# Patient Record
Sex: MALE | Race: WHITE | Employment: OTHER | URBAN - METROPOLITAN AREA
[De-identification: names, ages, dates, MRNs, and addresses within clinical notes are randomized per-mention and may not be internally consistent; named-entity substitution may affect disease eponyms.]

---

## 2017-02-15 ENCOUNTER — TELEPHONE (OUTPATIENT)
Dept: FAMILY MEDICINE CLINIC | Age: 77
End: 2017-02-15

## 2017-02-16 ENCOUNTER — OFFICE VISIT (OUTPATIENT)
Dept: FAMILY MEDICINE CLINIC | Age: 77
End: 2017-02-16

## 2017-02-16 VITALS
HEIGHT: 66 IN | BODY MASS INDEX: 32.33 KG/M2 | WEIGHT: 201.2 LBS | HEART RATE: 66 BPM | SYSTOLIC BLOOD PRESSURE: 109 MMHG | TEMPERATURE: 97.6 F | DIASTOLIC BLOOD PRESSURE: 79 MMHG | RESPIRATION RATE: 12 BRPM

## 2017-02-16 DIAGNOSIS — K59.01 SLOW TRANSIT CONSTIPATION: ICD-10-CM

## 2017-02-16 DIAGNOSIS — R41.3 MEMORY DIFFICULTIES: ICD-10-CM

## 2017-02-16 DIAGNOSIS — T14.8XXA BRUISE: ICD-10-CM

## 2017-02-16 DIAGNOSIS — R35.1 NOCTURIA: ICD-10-CM

## 2017-02-16 DIAGNOSIS — R52 ACHES: Primary | ICD-10-CM

## 2017-02-16 DIAGNOSIS — G47.9 SLEEP DIFFICULTIES: ICD-10-CM

## 2017-02-16 DIAGNOSIS — G44.031 INTRACTABLE EPISODIC PAROXYSMAL HEMICRANIA: ICD-10-CM

## 2017-02-16 PROCEDURE — 99204 OFFICE O/P NEW MOD 45 MIN: CPT | Performed by: FAMILY MEDICINE

## 2017-02-16 RX ORDER — ZINC GLUCONATE 50 MG
50 TABLET ORAL DAILY
COMMUNITY

## 2017-02-16 RX ORDER — VITAMIN B COMPLEX
1 CAPSULE ORAL DAILY
COMMUNITY
End: 2018-03-14 | Stop reason: DRUGHIGH

## 2017-02-16 RX ORDER — LISINOPRIL 20 MG/1
1 TABLET ORAL DAILY
COMMUNITY
Start: 2017-02-14

## 2017-02-16 RX ORDER — ACETAMINOPHEN 160 MG
2 TABLET,DISINTEGRATING ORAL DAILY
COMMUNITY
End: 2018-11-27 | Stop reason: DRUGHIGH

## 2017-02-16 RX ORDER — TAMSULOSIN HYDROCHLORIDE 0.4 MG/1
1 CAPSULE ORAL DAILY
COMMUNITY
Start: 2016-12-06

## 2017-02-16 RX ORDER — MAGNESIUM 200 MG
1 TABLET ORAL
COMMUNITY

## 2017-02-16 RX ORDER — MULTIVITAMIN WITH IRON
200 TABLET ORAL DAILY
COMMUNITY

## 2017-02-16 ASSESSMENT — ENCOUNTER SYMPTOMS
SINUS PRESSURE: 1
SHORTNESS OF BREATH: 1
ALLERGIC/IMMUNOLOGIC NEGATIVE: 1
CONSTIPATION: 1

## 2017-02-17 ENCOUNTER — TELEPHONE (OUTPATIENT)
Dept: FAMILY MEDICINE CLINIC | Age: 77
End: 2017-02-17

## 2017-02-17 PROBLEM — K90.9 INTESTINAL MALABSORPTION, UNSPECIFIED: Status: ACTIVE | Noted: 2017-02-17

## 2017-03-07 ENCOUNTER — OFFICE VISIT (OUTPATIENT)
Dept: FAMILY MEDICINE CLINIC | Age: 77
End: 2017-03-07

## 2017-03-07 VITALS
HEART RATE: 88 BPM | BODY MASS INDEX: 32.5 KG/M2 | SYSTOLIC BLOOD PRESSURE: 83 MMHG | HEIGHT: 66 IN | RESPIRATION RATE: 12 BRPM | WEIGHT: 202.2 LBS | DIASTOLIC BLOOD PRESSURE: 60 MMHG | TEMPERATURE: 97.5 F

## 2017-03-07 DIAGNOSIS — K90.9 INTESTINAL MALABSORPTION, UNSPECIFIED: ICD-10-CM

## 2017-03-07 DIAGNOSIS — K59.01 SLOW TRANSIT CONSTIPATION: ICD-10-CM

## 2017-03-07 DIAGNOSIS — R35.1 NOCTURIA: ICD-10-CM

## 2017-03-07 DIAGNOSIS — T14.8XXA BRUISE: ICD-10-CM

## 2017-03-07 DIAGNOSIS — G47.9 SLEEP DIFFICULTIES: ICD-10-CM

## 2017-03-07 DIAGNOSIS — R52 ACHES: ICD-10-CM

## 2017-03-07 DIAGNOSIS — R41.3 MEMORY DIFFICULTIES: ICD-10-CM

## 2017-03-07 DIAGNOSIS — G44.031 INTRACTABLE EPISODIC PAROXYSMAL HEMICRANIA: ICD-10-CM

## 2017-03-07 PROCEDURE — 99214 OFFICE O/P EST MOD 30 MIN: CPT | Performed by: FAMILY MEDICINE

## 2017-03-07 RX ORDER — AMPICILLIN TRIHYDRATE 250 MG
3 CAPSULE ORAL 4 TIMES DAILY
COMMUNITY

## 2017-03-07 RX ORDER — MELATONIN 3 MG
50 TABLET ORAL DAILY
COMMUNITY

## 2017-03-07 RX ORDER — OMEGA-3 FATTY ACIDS/FISH OIL 300-1000MG
2 CAPSULE ORAL
COMMUNITY
End: 2018-05-23 | Stop reason: CLARIF

## 2017-07-11 ENCOUNTER — OFFICE VISIT (OUTPATIENT)
Dept: FAMILY MEDICINE CLINIC | Age: 77
End: 2017-07-11

## 2017-07-11 VITALS
TEMPERATURE: 98 F | HEART RATE: 79 BPM | DIASTOLIC BLOOD PRESSURE: 65 MMHG | SYSTOLIC BLOOD PRESSURE: 90 MMHG | WEIGHT: 201 LBS | HEIGHT: 67 IN | RESPIRATION RATE: 14 BRPM | BODY MASS INDEX: 31.55 KG/M2

## 2017-07-11 DIAGNOSIS — G44.031 INTRACTABLE EPISODIC PAROXYSMAL HEMICRANIA: ICD-10-CM

## 2017-07-11 DIAGNOSIS — R35.1 NOCTURIA: ICD-10-CM

## 2017-07-11 DIAGNOSIS — K59.01 SLOW TRANSIT CONSTIPATION: ICD-10-CM

## 2017-07-11 DIAGNOSIS — T14.8XXA BRUISE: ICD-10-CM

## 2017-07-11 DIAGNOSIS — R41.3 MEMORY DIFFICULTIES: ICD-10-CM

## 2017-07-11 DIAGNOSIS — R52 ACHES: ICD-10-CM

## 2017-07-11 DIAGNOSIS — K90.9 INTESTINAL MALABSORPTION, UNSPECIFIED: ICD-10-CM

## 2017-07-11 DIAGNOSIS — G47.9 SLEEP DIFFICULTIES: ICD-10-CM

## 2017-07-11 PROCEDURE — 99214 OFFICE O/P EST MOD 30 MIN: CPT | Performed by: FAMILY MEDICINE

## 2017-07-11 ASSESSMENT — PATIENT HEALTH QUESTIONNAIRE - PHQ9
SUM OF ALL RESPONSES TO PHQ QUESTIONS 1-9: 0
SUM OF ALL RESPONSES TO PHQ9 QUESTIONS 1 & 2: 0
1. LITTLE INTEREST OR PLEASURE IN DOING THINGS: 0
2. FEELING DOWN, DEPRESSED OR HOPELESS: 0

## 2017-07-19 ENCOUNTER — TELEPHONE (OUTPATIENT)
Dept: FAMILY MEDICINE CLINIC | Age: 77
End: 2017-07-19

## 2017-11-30 ENCOUNTER — NURSE ONLY (OUTPATIENT)
Dept: FAMILY MEDICINE CLINIC | Age: 77
End: 2017-11-30
Payer: MEDICARE

## 2017-11-30 DIAGNOSIS — G44.031 INTRACTABLE EPISODIC PAROXYSMAL HEMICRANIA: ICD-10-CM

## 2017-11-30 DIAGNOSIS — K90.9 INTESTINAL MALABSORPTION, UNSPECIFIED TYPE: ICD-10-CM

## 2017-11-30 DIAGNOSIS — R41.3 MEMORY DIFFICULTIES: ICD-10-CM

## 2017-11-30 DIAGNOSIS — R52 ACHES: ICD-10-CM

## 2017-11-30 DIAGNOSIS — R35.1 NOCTURIA: ICD-10-CM

## 2017-11-30 DIAGNOSIS — T14.8XXA BRUISE: ICD-10-CM

## 2017-11-30 DIAGNOSIS — K59.01 SLOW TRANSIT CONSTIPATION: ICD-10-CM

## 2017-11-30 DIAGNOSIS — G47.9 SLEEP DIFFICULTIES: ICD-10-CM

## 2017-11-30 DIAGNOSIS — K90.9 INTESTINAL MALABSORPTION: ICD-10-CM

## 2017-11-30 LAB
AVERAGE GLUCOSE: 96 MG/DL (ref 70–126)
BASOPHILS # BLD: 0.6 %
BASOPHILS ABSOLUTE: 0 THOU/MM3 (ref 0–0.1)
CALCIUM SERPL-MCNC: 9.8 MG/DL (ref 8.5–10.5)
CHOLESTEROL, TOTAL: 273 MG/DL (ref 100–199)
EOSINOPHIL # BLD: 5.8 %
EOSINOPHILS ABSOLUTE: 0.4 THOU/MM3 (ref 0–0.4)
HBA1C MFR BLD: 5.2 % (ref 4.4–6.4)
HCT VFR BLD CALC: 49.6 % (ref 42–52)
HDLC SERPL-MCNC: 61 MG/DL
HEMOGLOBIN: 16.6 GM/DL (ref 14–18)
LDL CHOLESTEROL CALCULATED: 170 MG/DL
LYMPHOCYTES # BLD: 22.7 %
LYMPHOCYTES ABSOLUTE: 1.7 THOU/MM3 (ref 1–4.8)
MAGNESIUM: 2.5 MG/DL (ref 1.6–2.4)
MCH RBC QN AUTO: 29.4 PG (ref 27–31)
MCHC RBC AUTO-ENTMCNC: 33.4 GM/DL (ref 33–37)
MCV RBC AUTO: 87.8 FL (ref 80–94)
MONOCYTES # BLD: 6.9 %
MONOCYTES ABSOLUTE: 0.5 THOU/MM3 (ref 0.4–1.3)
NUCLEATED RED BLOOD CELLS: 0 /100 WBC
PDW BLD-RTO: 14.1 % (ref 11.5–14.5)
PLATELET # BLD: 254 THOU/MM3 (ref 130–400)
PMV BLD AUTO: 8.1 MCM (ref 7.4–10.4)
PTH INTACT: 42 PG/ML (ref 15–65)
RBC # BLD: 5.65 MILL/MM3 (ref 4.7–6.1)
SEG NEUTROPHILS: 64 %
SEGMENTED NEUTROPHILS ABSOLUTE COUNT: 4.7 THOU/MM3 (ref 1.8–7.7)
T3 TOTAL: 116 NG/DL (ref 72–181)
TRIGL SERPL-MCNC: 210 MG/DL (ref 0–199)
TSH SERPL DL<=0.05 MIU/L-ACNC: 2.12 UIU/ML (ref 0.4–4.2)
VITAMIN D 25-HYDROXY: 58 NG/ML (ref 30–100)
WBC # BLD: 7.4 THOU/MM3 (ref 4.8–10.8)

## 2017-11-30 PROCEDURE — 36415 COLL VENOUS BLD VENIPUNCTURE: CPT | Performed by: FAMILY MEDICINE

## 2017-12-01 LAB — THYROXINE (T4): 6.7 UG/DL (ref 4.5–12)

## 2017-12-02 LAB
C-REACTIVE PROTEIN, HIGH SENSITIVITY: 5.7 MG/L
DHEAS (DHEA SULFATE): 364 UG/DL (ref 28–175)
HOMOCYSTEINE, TOTAL: 9 UMOL/L
TESTOSTERONE FREE: NORMAL
THYROID PEROXIDASE ANTIBODY: 2.6 IU/ML (ref 0–9)

## 2017-12-03 LAB — GLIADIN PEPTIDE IGG, IGA: NORMAL

## 2017-12-05 LAB — DHEA UNCONJUGATED: 2.44 NG/ML (ref 0.63–4.7)

## 2017-12-14 ENCOUNTER — OFFICE VISIT (OUTPATIENT)
Dept: FAMILY MEDICINE CLINIC | Age: 77
End: 2017-12-14
Payer: MEDICARE

## 2017-12-14 VITALS
DIASTOLIC BLOOD PRESSURE: 75 MMHG | HEIGHT: 67 IN | BODY MASS INDEX: 32.3 KG/M2 | HEART RATE: 78 BPM | SYSTOLIC BLOOD PRESSURE: 108 MMHG | WEIGHT: 205.8 LBS | RESPIRATION RATE: 12 BRPM | TEMPERATURE: 98 F

## 2017-12-14 DIAGNOSIS — K59.01 SLOW TRANSIT CONSTIPATION: ICD-10-CM

## 2017-12-14 DIAGNOSIS — K90.49 MALABSORPTION DUE TO INTOLERANCE, NOT ELSEWHERE CLASSIFIED: ICD-10-CM

## 2017-12-14 DIAGNOSIS — E78.5 ELEVATED LIPIDS: Primary | ICD-10-CM

## 2017-12-14 DIAGNOSIS — G47.9 SLEEP DIFFICULTIES: ICD-10-CM

## 2017-12-14 DIAGNOSIS — R73.9 BLOOD GLUCOSE ELEVATED: ICD-10-CM

## 2017-12-14 DIAGNOSIS — R35.1 NOCTURIA: ICD-10-CM

## 2017-12-14 DIAGNOSIS — R41.3 MEMORY DIFFICULTIES: ICD-10-CM

## 2017-12-14 DIAGNOSIS — R52 ACHES: ICD-10-CM

## 2017-12-14 DIAGNOSIS — T14.8XXA BRUISE: ICD-10-CM

## 2017-12-14 DIAGNOSIS — G44.031 INTRACTABLE EPISODIC PAROXYSMAL HEMICRANIA: ICD-10-CM

## 2017-12-14 PROCEDURE — 99214 OFFICE O/P EST MOD 30 MIN: CPT | Performed by: FAMILY MEDICINE

## 2017-12-14 NOTE — PROGRESS NOTES
Subjective:      Patient ID: Zheng Minaya is a 69 y.o. male. HPI   Zheng Minaya is a 69 y.o. White male. Jacques Talavera  presents to the [employer] today for   Chief Complaint   Patient presents with    3 Month Follow-Up     wee    Discuss Labs    Discuss Medications   ,  and;   Elevated lipids    Malabsorption due to intolerance, not elsewhere classified    Nocturia    Intractable episodic paroxysmal hemicrania    Slow transit constipation    Aches    Memory difficulties    Bruise    Sleep difficulties    Blood glucose elevated      I have reviewed Zheng Minaya medical, surgical and other pertinent history in detail, and have updated medication and allergy information in the computerized patient record. Clinical Care Team:     -Referring Provider for today's consult: Self Referred  -Primary Care Provider: Jeremy Carbajal    Medical/Surgical History:   He  has a past medical history of Hypertension. His  has a past surgical history that includes ostate surgery; Tonsillectomy; and Colonoscopy. Family/Social History:     His family history includes High Blood Pressure in his mother; No Known Problems in his father. He  reports that he has quit smoking. He has never used smokeless tobacco. He reports that he does not drink alcohol or use drugs.     Medications/Allergies/Immunizations:     His current medication(s) include   Current Outpatient Prescriptions:     B Complex-Folic Acid (Y-444 BALANCED TR PO), Take 1 tablet by mouth 3 times daily (before meals) , Disp: , Rfl:     DHEA 10 MG TABS, Take 50 mg by mouth daily , Disp: , Rfl:     Cinnamon 500 MG CAPS, Take 2 capsules by mouth 3 times daily , Disp: , Rfl:     Omega 3 1000 MG CAPS, Take 2 capsules by mouth 4 times daily (before meals and nightly) [facility] # [id number] pharmaceutical grade CATHY , Disp: , Rfl:     Levomefolic Acid (5-MTHF PO), Take 10 mg by mouth every morning (before breakfast) Metabolic Maintenance at [facility], Disp: , well-developed and well-nourished. HENT:   Head: Normocephalic. Pulmonary/Chest: Effort normal.   Neurological: He is alert and oriented to person, place, and time. Psychiatric: He has a normal mood and affect. Thought content normal.   Nursing note and vitals reviewed. Assessment:     Laboratory Data:   Lab results were searched in Care Everywhere and/or those brought by the pateint were reviewed today with Ciarra Guerrero and he has a copy of their most recent labs to take home with them as noted below;       Imaging Data:   Imaging Data:       Assessment & Plan:       Impression:  1. Elevated lipids    2. Malabsorption due to intolerance, not elsewhere classified    3. Nocturia    4. Intractable episodic paroxysmal hemicrania    5. Slow transit constipation    6. Aches    7. Memory difficulties    8. Bruise    9. Sleep difficulties    10. Blood glucose elevated      Assessment and Plan:  After reviewing the patients chief complaints, reviewing their lab findings in great detail (with the patient and those accompanying them) which correlate to their chief complaints, symptoms, and or medical conditions; suggestions were made relating to changes in diet and or supplements which may improve the complaints and which will be reflected in their future lab findings; Chief Complaint   Patient presents with    3 Month Follow-Up     wee    Discuss Labs    Discuss Medications   ;    Plans for the next visits:  - Abnormal and non-optimal Labs were ordered today to be repeated in the next 120-365 days to assess changes from adjustments in nutrition and or nutrients.    - Patient instructed when having a blood draw to ask the  to divide their lab draws into multiple draws over several days if not feeling good at the time of the lab draw or if either prefers to do several smaller blood draws over several days  - Patient instructed to check with insurer before each lab draw and to go to the lab which the insurer Iron ferrous sulfat 325 mg tabs  - Centrum Silver look-a-like for most patients not needing iron, or  - Centrum plain look-a-like if need iron    Local pharmacy chains such as [facility], [facility], [facility];  - Triple Strength Fish Oil (enteric coated if available) or    If not enteric coated, can take from freezer for less burps  - B-50 or B-100 time released balanced B complex tabs not containing Vit C in tablet  - Cinnamon bark 500 mg (with Chromium but not extracts)   some brands list 1000 mg / serving of 2 500 mg capsules,    some brands have 1000 mg caps with the chromium but capsule size is huge  - Calcium carbonate/citrate, magnesium oxide/citrate, Vit D 3  as 3-4 tabs/caps/serving     Some Local Brands may contain Zinc which is acceptable for the first bottle or two  - Magnesium oxide 250 mg tabs for those having < 2 bowel movements daily  - Magnesium citrate TABLETS  or Slow Mag, or magnesium gluconate if having > 2 bowel movement/day  - Centrum Silver or look-a-like for most patients, Centrum plain or look-a-like with iron  - Vitamin D-3 comes as 1,000 IU or 2,000 IU or 5,000 IU gel caps or Liquid drops      Some brands containing or derived from soy oil or corn oil are OK if not allergic to soy  - Elemental Iron 65 mg tabs at bedtime is available over the counter if need more iron     Usually turns bowel movements grey, green or black but not a concern  - Apricot Kernel Oil (by Now) for dry skin and use in sensitive perineal area skin    Nutrients for ongoing use by Mail order for less expense from www.amazon. com, [employer], www.[employer]   - Triple Strength Fish Oil , Softgels   - B-100 time released balanced B complex   - Cinnamon bark 500 mg with or without Chromium but not extract (ineffective)  - Calcium carbonate 1000 mg, Magnesium oxide 500 mg, Vit D 3 best as individual  - Magnesium oxide 250 mg, 400 mg, or 500 mg tabs if < 2 bowel movements daily  - Multivitamin onions, parsley, peas, peaches, pears, bell peppers, potatoes, radishes, squash, sweet corn, tomatoes, turnips, watermelons  September; apples, beans, beets, blueberries, cabbage, cantaloupe, carrots, cauliflower, celery, cucumbers, eggplant, grapes, green onions, greens, lettuce, onions, parsley, peas, peaches, pears, bell peppers, plums, potatoes, pumpkins, radishes, fall red raspberries, squash, sweet corn, tomatoes, turnips, watermelons  October; apples, beets, broccoli, cabbage, carrots, cauliflower, celery, green onions, greens, lettuce, parsley, peas, pears, potatoes, pumpkins, radishes, fall red raspberries, squash, turnips  November; broccoli, cabbage, carrots, parsley, pears, peas  December: use canned, frozen or dried fruits since lower in latex    Up to half of latex-sensitive patients show allergic reactions to fruits (avocados, bananas, kiwifruits, papayas, peaches),   Annals of Allergy, 1994. These plants contain the same proteins that are allergens in latex. People with fruit allergies should warn physicians before undergoing procedures which may cause anaphylactic reaction if in contact with latex gloves. Some of the common foods with defined cross-reactivity to latex are avocado, banana, kiwi, chestnut, raw potato, tomato, stone fruits (e.g., peach, cherry), hazelnut, melons, celery, carrot, apple, pear, papaya, and almond. Foods with less well-defined cross-reactivity to latex are peanuts, peppers, citrus fruits, coconut, pineapple, priti, fig, passion fruit, Ugli fruit, and grape    This fruit/latex cross-reactivity is worsened by ethylene, a gas used to hasten commercial ripening. In nature, plants produce low levels of the hormone ethylene, which regulates germination, flowering, and ripening. Forced ripening by high ethylene concentrations, plants produce allergenic wound-repair proteins, which are similar to wound-repair proteins made during the tapping of rubber trees.  Sensitive individuals who ingest the fruit get a higher dose and worse reaction. Some people may even first become sensitized to latex through fruit. Can food processing increase the concentrations of allergenic proteins? Latex-sensitized children (and adults) in [place] often experience allergic reactions after eating bananas ripened artificially with ethylene. In the [facility], food distribution centers treat unripe bananas and other produce with ethylene to ripen; not commonly done in [facility] since fruit is tree-ripened there. Does treatment of food with ethylene induce banana proteins that cross-react with latex? (Marianela et al.    References:   Latex in Foods Allergy, http://ehp.niehs.nih.gov/members/2003/5811/5811.html    Search web for \" Whats in Season \" for where you live or are at the time you food shop  www.nutritioncouncil.org/pdf/healthy/SeasonalProduce. pdf ,   Management of Latex, http://medicalcenter. [facility].edu/  search for latex

## 2018-02-28 ENCOUNTER — NURSE ONLY (OUTPATIENT)
Dept: FAMILY MEDICINE CLINIC | Age: 78
End: 2018-02-28
Payer: MEDICARE

## 2018-02-28 DIAGNOSIS — T14.8XXA BRUISE: ICD-10-CM

## 2018-02-28 DIAGNOSIS — R35.1 NOCTURIA: ICD-10-CM

## 2018-02-28 DIAGNOSIS — R73.9 BLOOD GLUCOSE ELEVATED: ICD-10-CM

## 2018-02-28 DIAGNOSIS — R41.3 MEMORY DIFFICULTIES: ICD-10-CM

## 2018-02-28 DIAGNOSIS — K90.49 MALABSORPTION DUE TO INTOLERANCE, NOT ELSEWHERE CLASSIFIED: ICD-10-CM

## 2018-02-28 DIAGNOSIS — K59.01 SLOW TRANSIT CONSTIPATION: ICD-10-CM

## 2018-02-28 DIAGNOSIS — G44.031 INTRACTABLE EPISODIC PAROXYSMAL HEMICRANIA: ICD-10-CM

## 2018-02-28 DIAGNOSIS — G47.9 SLEEP DIFFICULTIES: ICD-10-CM

## 2018-02-28 DIAGNOSIS — R52 ACHES: ICD-10-CM

## 2018-02-28 DIAGNOSIS — E78.5 ELEVATED LIPIDS: ICD-10-CM

## 2018-02-28 LAB
ALBUMIN SERPL-MCNC: 4.1 G/DL (ref 3.5–5.1)
ALP BLD-CCNC: 85 U/L (ref 38–126)
ALT SERPL-CCNC: 15 U/L (ref 11–66)
AST SERPL-CCNC: 20 U/L (ref 5–40)
AVERAGE GLUCOSE: 96 MG/DL (ref 70–126)
BASOPHILS # BLD: 0.6 %
BASOPHILS ABSOLUTE: 0 THOU/MM3 (ref 0–0.1)
BILIRUB SERPL-MCNC: 0.6 MG/DL (ref 0.3–1.2)
BILIRUBIN DIRECT: < 0.2 MG/DL (ref 0–0.3)
CALCIUM SERPL-MCNC: 9.8 MG/DL (ref 8.5–10.5)
CHOLESTEROL, TOTAL: 227 MG/DL (ref 100–199)
EOSINOPHIL # BLD: 7.6 %
EOSINOPHILS ABSOLUTE: 0.5 THOU/MM3 (ref 0–0.4)
HBA1C MFR BLD: 5.2 % (ref 4.4–6.4)
HCT VFR BLD CALC: 44.5 % (ref 42–52)
HDLC SERPL-MCNC: 55 MG/DL
HEMOGLOBIN: 15.1 GM/DL (ref 14–18)
LDL CHOLESTEROL CALCULATED: 146 MG/DL
LYMPHOCYTES # BLD: 18.6 %
LYMPHOCYTES ABSOLUTE: 1.3 THOU/MM3 (ref 1–4.8)
MAGNESIUM: 2.5 MG/DL (ref 1.6–2.4)
MCH RBC QN AUTO: 29.9 PG (ref 27–31)
MCHC RBC AUTO-ENTMCNC: 34 GM/DL (ref 33–37)
MCV RBC AUTO: 88.1 FL (ref 80–94)
MONOCYTES # BLD: 7.7 %
MONOCYTES ABSOLUTE: 0.6 THOU/MM3 (ref 0.4–1.3)
NUCLEATED RED BLOOD CELLS: 0 /100 WBC
PDW BLD-RTO: 14.4 % (ref 11.5–14.5)
PLATELET # BLD: 241 THOU/MM3 (ref 130–400)
PMV BLD AUTO: 8 FL (ref 7.4–10.4)
PTH INTACT: 40.5 PG/ML (ref 15–65)
RBC # BLD: 5.05 MILL/MM3 (ref 4.7–6.1)
RHEUMATOID FACTOR: < 10 IU/ML (ref 0–13)
SEDIMENTATION RATE, ERYTHROCYTE: 10 MM/HR (ref 0–10)
SEG NEUTROPHILS: 65.5 %
SEGMENTED NEUTROPHILS ABSOLUTE COUNT: 4.7 THOU/MM3 (ref 1.8–7.7)
T3 FREE: 3.13 PG/ML (ref 2.02–4.43)
T3 TOTAL: 100 NG/DL (ref 72–181)
THYROXINE (T4): 6.4 UG/DL (ref 4.5–12)
TOTAL PROTEIN: 7.3 G/DL (ref 6.1–8)
TRIGL SERPL-MCNC: 128 MG/DL (ref 0–199)
TSH SERPL DL<=0.05 MIU/L-ACNC: 2.23 UIU/ML (ref 0.4–4.2)
URIC ACID: 5.6 MG/DL (ref 3.7–7)
VITAMIN D 25-HYDROXY: 49 NG/ML (ref 30–100)
WBC # BLD: 7.2 THOU/MM3 (ref 4.8–10.8)

## 2018-02-28 PROCEDURE — 36415 COLL VENOUS BLD VENIPUNCTURE: CPT | Performed by: FAMILY MEDICINE

## 2018-03-02 LAB
ANA SCREEN: NORMAL
C-REACTIVE PROTEIN, HIGH SENSITIVITY: 4.7 MG/L
DHEAS (DHEA SULFATE): 548 UG/DL (ref 28–175)
GLIADIN PEPTIDE IGG, IGA: NORMAL
HOMOCYSTEINE, TOTAL: 8 UMOL/L
TESTOSTERONE FREE: NORMAL
THYROGLOBULIN: NORMAL
THYROID PEROXIDASE ANTIBODY: 2.7 IU/ML (ref 0–9)

## 2018-03-03 LAB — DHEA UNCONJUGATED: 3.26 NG/ML (ref 0.63–4.7)

## 2018-03-14 ENCOUNTER — OFFICE VISIT (OUTPATIENT)
Dept: FAMILY MEDICINE CLINIC | Age: 78
End: 2018-03-14
Payer: MEDICARE

## 2018-03-14 VITALS
SYSTOLIC BLOOD PRESSURE: 99 MMHG | HEART RATE: 88 BPM | BODY MASS INDEX: 32.72 KG/M2 | DIASTOLIC BLOOD PRESSURE: 70 MMHG | TEMPERATURE: 97.9 F | HEIGHT: 67 IN | WEIGHT: 208.5 LBS

## 2018-03-14 DIAGNOSIS — T14.8XXA BRUISE: ICD-10-CM

## 2018-03-14 DIAGNOSIS — R35.1 NOCTURIA: ICD-10-CM

## 2018-03-14 DIAGNOSIS — R41.3 MEMORY DIFFICULTIES: ICD-10-CM

## 2018-03-14 DIAGNOSIS — E78.5 ELEVATED LIPIDS: ICD-10-CM

## 2018-03-14 DIAGNOSIS — K59.01 SLOW TRANSIT CONSTIPATION: ICD-10-CM

## 2018-03-14 DIAGNOSIS — R52 ACHES: ICD-10-CM

## 2018-03-14 DIAGNOSIS — G44.031 INTRACTABLE EPISODIC PAROXYSMAL HEMICRANIA: ICD-10-CM

## 2018-03-14 DIAGNOSIS — R73.9 BLOOD GLUCOSE ELEVATED: ICD-10-CM

## 2018-03-14 DIAGNOSIS — G47.9 SLEEP DIFFICULTIES: ICD-10-CM

## 2018-03-14 DIAGNOSIS — K90.49 MALABSORPTION DUE TO INTOLERANCE, NOT ELSEWHERE CLASSIFIED: ICD-10-CM

## 2018-03-14 PROCEDURE — 99214 OFFICE O/P EST MOD 30 MIN: CPT | Performed by: FAMILY MEDICINE

## 2018-03-14 NOTE — PROGRESS NOTES
Weakness   ;    Plans for the next visits:  - Abnormal and non-optimal Labs were ordered today to be repeated in the next 120-365 days to assess changes from adjustments in nutrition and or nutrients. - Patient instructed when having a blood draw to ask the  to divide their lab draws into multiple draws over several days if not feeling good at the time of the lab draw or if either prefers to do several smaller blood draws over several days  - Patient instructed to check with insurer before each lab draw and to go to the lab which the insurer directs them for the most cost effective lab draw with the least patient's cost  - Viky Rivera  will be scheduled subsequent to those results. Elba Nissen will bring in his drink and food log to his next visit    Chronic Problems Addressed on this Visit:                                   1.  Intensity of Service; Uncontrolled items at this visit; Chief Complaint   Patient presents with    3 Month Follow-Up    Discuss Labs     02/28/18    Hyperlipidemia    Fatigue    Urniary Frequency at Night    Insomnia    Extremity Weakness   ; Improved items reviewed at this visit; Stable items noted at this visit;  2. Patient's foods and drinks were reviewed with the patient,       - Viky Rivera will bring food+drink symptom log to next visit for inclusion in their record      - 75 better food list reviewed & given to patient with the omega 6 food list to avoid         - Gluten in corn and oats abstracts sheet reviewed and given to the patient today   3. Greater than 25 minutes were spent face to face on this visit of which >50% was for counseling and coordination of care.       Patient's foods, drinks and supplements were reviewed with the patient,   - they will bring a food drink symptom log to future visits for inclusion in their record    - 75 better food list reviewed & given to patient along with the omega 6 food list to avoid      - Gluten in corn carrot, apple, pear, papaya, and almond. Foods with less well-defined cross-reactivity to latex are peanuts, peppers, citrus fruits, coconut, pineapple, priti, fig, passion fruit, Ugli fruit, and grape    This fruit/latex cross-reactivity is worsened by ethylene, a gas used to hasten commercial ripening. In nature, plants produce low levels of the hormone ethylene, which regulates germination, flowering, and ripening. Forced ripening by high ethylene concentrations, plants produce allergenic wound-repair proteins, which are similar to wound-repair proteins made during the tapping of rubber trees. Sensitive individuals who ingest the fruit get a higher dose and worse reaction. Some people may even first become sensitized to latex through fruit. Can food processing increase the concentrations of allergenic proteins? Latex-sensitized children (and adults) in [place] often experience allergic reactions after eating bananas ripened artificially with ethylene. In the [place], food distribution centers treat unripe bananas and other produce with ethylene to ripen; not commonly done in [facility] since fruit is tree-ripened there. Does treatment of food with ethylene induce banana proteins that cross-react with latex? (Marianela et al.    References:   Latex in Foods Allergy, http://ehp.niehs.nih.gov/members/2003/5811/5811.html    Search web for \" Whats in Season \" for where you live or are at the time you food shop  www.nutritioncouncil.org/pdf/healthy/SeasonalProduce. pdf ,   Management of Latex, http://medicalcenter. [facility].edu/  search for latex

## 2018-05-15 ENCOUNTER — NURSE ONLY (OUTPATIENT)
Dept: FAMILY MEDICINE CLINIC | Age: 78
End: 2018-05-15
Payer: MEDICARE

## 2018-05-15 DIAGNOSIS — R41.3 MEMORY DIFFICULTIES: ICD-10-CM

## 2018-05-15 DIAGNOSIS — K59.01 SLOW TRANSIT CONSTIPATION: ICD-10-CM

## 2018-05-15 DIAGNOSIS — G44.031 INTRACTABLE EPISODIC PAROXYSMAL HEMICRANIA: ICD-10-CM

## 2018-05-15 DIAGNOSIS — G47.9 SLEEP DIFFICULTIES: ICD-10-CM

## 2018-05-15 DIAGNOSIS — R73.9 BLOOD GLUCOSE ELEVATED: ICD-10-CM

## 2018-05-15 DIAGNOSIS — R52 ACHES: ICD-10-CM

## 2018-05-15 DIAGNOSIS — R35.1 NOCTURIA: ICD-10-CM

## 2018-05-15 DIAGNOSIS — K90.49 MALABSORPTION DUE TO INTOLERANCE, NOT ELSEWHERE CLASSIFIED: ICD-10-CM

## 2018-05-15 DIAGNOSIS — E78.5 ELEVATED LIPIDS: ICD-10-CM

## 2018-05-15 DIAGNOSIS — T14.8XXA BRUISE: ICD-10-CM

## 2018-05-15 LAB
ANION GAP SERPL CALCULATED.3IONS-SCNC: 14 MEQ/L (ref 8–16)
ANISOCYTOSIS: ABNORMAL
AVERAGE GLUCOSE: 96 MG/DL (ref 70–126)
BASOPHILS # BLD: 0.5 %
BASOPHILS ABSOLUTE: 0 THOU/MM3 (ref 0–0.1)
BUN BLDV-MCNC: 26 MG/DL (ref 7–22)
CALCIUM SERPL-MCNC: 9.6 MG/DL (ref 8.5–10.5)
CHLORIDE BLD-SCNC: 102 MEQ/L (ref 98–111)
CHOLESTEROL, TOTAL: 235 MG/DL (ref 100–199)
CO2: 24 MEQ/L (ref 23–33)
CREAT SERPL-MCNC: 1 MG/DL (ref 0.4–1.2)
EOSINOPHIL # BLD: 7.2 %
EOSINOPHILS ABSOLUTE: 0.6 THOU/MM3 (ref 0–0.4)
GFR SERPL CREATININE-BSD FRML MDRD: 72 ML/MIN/1.73M2
GLUCOSE BLD-MCNC: 98 MG/DL (ref 70–108)
HBA1C MFR BLD: 5.2 % (ref 4.4–6.4)
HCT VFR BLD CALC: 44.3 % (ref 42–52)
HDLC SERPL-MCNC: 57 MG/DL
HEMOGLOBIN: 15.1 GM/DL (ref 14–18)
LDL CHOLESTEROL CALCULATED: 145 MG/DL
LYMPHOCYTES # BLD: 24.7 %
LYMPHOCYTES ABSOLUTE: 1.9 THOU/MM3 (ref 1–4.8)
MAGNESIUM: 2.5 MG/DL (ref 1.6–2.4)
MCH RBC QN AUTO: 30 PG (ref 27–31)
MCHC RBC AUTO-ENTMCNC: 34 GM/DL (ref 33–37)
MCV RBC AUTO: 88.2 FL (ref 80–94)
MONOCYTES # BLD: 6.5 %
MONOCYTES ABSOLUTE: 0.5 THOU/MM3 (ref 0.4–1.3)
NUCLEATED RED BLOOD CELLS: 0 /100 WBC
PDW BLD-RTO: 14.8 % (ref 11.5–14.5)
PLATELET # BLD: 256 THOU/MM3 (ref 130–400)
PMV BLD AUTO: 8 FL (ref 7.4–10.4)
POTASSIUM SERPL-SCNC: 4 MEQ/L (ref 3.5–5.2)
PTH INTACT: 39.7 PG/ML (ref 15–65)
RBC # BLD: 5.03 MILL/MM3 (ref 4.7–6.1)
SEDIMENTATION RATE, ERYTHROCYTE: 8 MM/HR (ref 0–10)
SEG NEUTROPHILS: 61.1 %
SEGMENTED NEUTROPHILS ABSOLUTE COUNT: 4.7 THOU/MM3 (ref 1.8–7.7)
SODIUM BLD-SCNC: 140 MEQ/L (ref 135–145)
T3 TOTAL: 126 NG/DL (ref 72–181)
T4 FREE: 1.13 NG/DL (ref 0.93–1.76)
TRIGL SERPL-MCNC: 163 MG/DL (ref 0–199)
TSH SERPL DL<=0.05 MIU/L-ACNC: 1.81 UIU/ML (ref 0.4–4.2)
VITAMIN D 25-HYDROXY: 45 NG/ML (ref 30–100)
WBC # BLD: 7.7 THOU/MM3 (ref 4.8–10.8)

## 2018-05-15 PROCEDURE — 36415 COLL VENOUS BLD VENIPUNCTURE: CPT | Performed by: FAMILY MEDICINE

## 2018-05-16 LAB
T3 FREE: 3.47 PG/ML (ref 2.02–4.43)
THYROXINE (T4): 6.5 UG/DL (ref 4.5–12)

## 2018-05-17 LAB
C-REACTIVE PROTEIN, HIGH SENSITIVITY: 3.4 MG/L
DHEAS (DHEA SULFATE): 604 UG/DL (ref 28–175)
HOMOCYSTEINE, TOTAL: 10 UMOL/L
TESTOSTERONE FREE: NORMAL
THYROID PEROXIDASE ANTIBODY: 2.6 IU/ML (ref 0–9)

## 2018-05-18 LAB — GLIADIN PEPTIDE IGG, IGA: NORMAL

## 2018-05-19 LAB — DHEA UNCONJUGATED: 4.62 NG/ML (ref 0.63–4.7)

## 2018-05-23 ENCOUNTER — OFFICE VISIT (OUTPATIENT)
Dept: FAMILY MEDICINE CLINIC | Age: 78
End: 2018-05-23
Payer: MEDICARE

## 2018-05-23 VITALS
DIASTOLIC BLOOD PRESSURE: 64 MMHG | BODY MASS INDEX: 31.71 KG/M2 | HEART RATE: 81 BPM | RESPIRATION RATE: 10 BRPM | HEIGHT: 67 IN | TEMPERATURE: 98 F | SYSTOLIC BLOOD PRESSURE: 97 MMHG | WEIGHT: 202 LBS

## 2018-05-23 DIAGNOSIS — E78.5 ELEVATED LIPIDS: ICD-10-CM

## 2018-05-23 DIAGNOSIS — G44.031 INTRACTABLE EPISODIC PAROXYSMAL HEMICRANIA: ICD-10-CM

## 2018-05-23 DIAGNOSIS — G47.9 SLEEP DIFFICULTIES: ICD-10-CM

## 2018-05-23 DIAGNOSIS — R52 ACHES: ICD-10-CM

## 2018-05-23 DIAGNOSIS — R41.3 MEMORY DIFFICULTIES: ICD-10-CM

## 2018-05-23 DIAGNOSIS — T14.8XXA BRUISE: ICD-10-CM

## 2018-05-23 DIAGNOSIS — K59.01 SLOW TRANSIT CONSTIPATION: ICD-10-CM

## 2018-05-23 DIAGNOSIS — R35.1 NOCTURIA: ICD-10-CM

## 2018-05-23 DIAGNOSIS — K90.41 NON-CELIAC GLUTEN SENSITIVITY: ICD-10-CM

## 2018-05-23 DIAGNOSIS — R73.9 BLOOD GLUCOSE ELEVATED: ICD-10-CM

## 2018-05-23 PROCEDURE — 99214 OFFICE O/P EST MOD 30 MIN: CPT | Performed by: FAMILY MEDICINE

## 2018-05-23 RX ORDER — CHLORAL HYDRATE 500 MG
3000 CAPSULE ORAL 3 TIMES DAILY
COMMUNITY

## 2018-05-23 RX ORDER — BACLOFEN 10 MG/1
20 TABLET ORAL 3 TIMES DAILY
COMMUNITY
Start: 2018-05-15

## 2018-08-07 ENCOUNTER — NURSE ONLY (OUTPATIENT)
Dept: FAMILY MEDICINE CLINIC | Age: 78
End: 2018-08-07
Payer: MEDICARE

## 2018-08-07 ENCOUNTER — TELEPHONE (OUTPATIENT)
Dept: FAMILY MEDICINE CLINIC | Age: 78
End: 2018-08-07

## 2018-08-07 DIAGNOSIS — T14.8XXA BRUISE: ICD-10-CM

## 2018-08-07 DIAGNOSIS — R35.1 NOCTURIA: ICD-10-CM

## 2018-08-07 DIAGNOSIS — G47.9 SLEEP DIFFICULTIES: ICD-10-CM

## 2018-08-07 DIAGNOSIS — R52 ACHES: ICD-10-CM

## 2018-08-07 DIAGNOSIS — R41.3 MEMORY DIFFICULTIES: ICD-10-CM

## 2018-08-07 DIAGNOSIS — E78.5 ELEVATED LIPIDS: ICD-10-CM

## 2018-08-07 DIAGNOSIS — R73.9 BLOOD GLUCOSE ELEVATED: ICD-10-CM

## 2018-08-07 DIAGNOSIS — K59.01 SLOW TRANSIT CONSTIPATION: ICD-10-CM

## 2018-08-07 DIAGNOSIS — K90.41 NON-CELIAC GLUTEN SENSITIVITY: ICD-10-CM

## 2018-08-07 DIAGNOSIS — G44.031 INTRACTABLE EPISODIC PAROXYSMAL HEMICRANIA: ICD-10-CM

## 2018-08-07 LAB
ANION GAP SERPL CALCULATED.3IONS-SCNC: 13 MEQ/L (ref 8–16)
AVERAGE GLUCOSE: 93 MG/DL (ref 70–126)
BASOPHILS # BLD: 0.4 %
BASOPHILS ABSOLUTE: 0 THOU/MM3 (ref 0–0.1)
BUN BLDV-MCNC: 16 MG/DL (ref 7–22)
CALCIUM SERPL-MCNC: 9.2 MG/DL (ref 8.5–10.5)
CHLORIDE BLD-SCNC: 100 MEQ/L (ref 98–111)
CHOLESTEROL, TOTAL: 226 MG/DL (ref 100–199)
CO2: 25 MEQ/L (ref 23–33)
CREAT SERPL-MCNC: 1.1 MG/DL (ref 0.4–1.2)
EOSINOPHIL # BLD: 6.1 %
EOSINOPHILS ABSOLUTE: 0.4 THOU/MM3 (ref 0–0.4)
ERYTHROCYTE [DISTWIDTH] IN BLOOD BY AUTOMATED COUNT: 13.7 % (ref 11.5–14.5)
ERYTHROCYTE [DISTWIDTH] IN BLOOD BY AUTOMATED COUNT: 45.2 FL (ref 35–45)
GFR SERPL CREATININE-BSD FRML MDRD: 65 ML/MIN/1.73M2
GLUCOSE BLD-MCNC: 100 MG/DL (ref 70–108)
HBA1C MFR BLD: 5.1 % (ref 4.4–6.4)
HCT VFR BLD CALC: 45.3 % (ref 42–52)
HDLC SERPL-MCNC: 54 MG/DL
HEMOGLOBIN: 15.1 GM/DL (ref 14–18)
IMMATURE GRANS (ABS): 0.02 THOU/MM3 (ref 0–0.07)
IMMATURE GRANULOCYTES: 0.3 %
LDL CHOLESTEROL CALCULATED: 143 MG/DL
LYMPHOCYTES # BLD: 22.8 %
LYMPHOCYTES ABSOLUTE: 1.6 THOU/MM3 (ref 1–4.8)
MAGNESIUM: 2.5 MG/DL (ref 1.6–2.4)
MCH RBC QN AUTO: 30.2 PG (ref 26–33)
MCHC RBC AUTO-ENTMCNC: 33.3 GM/DL (ref 32.2–35.5)
MCV RBC AUTO: 90.6 FL (ref 80–94)
MONOCYTES # BLD: 7.8 %
MONOCYTES ABSOLUTE: 0.6 THOU/MM3 (ref 0.4–1.3)
NUCLEATED RED BLOOD CELLS: 0 /100 WBC
PLATELET # BLD: 228 THOU/MM3 (ref 130–400)
PMV BLD AUTO: 9.7 FL (ref 9.4–12.4)
POTASSIUM SERPL-SCNC: 4.4 MEQ/L (ref 3.5–5.2)
PTH INTACT: 47 PG/ML (ref 15–65)
RBC # BLD: 5 MILL/MM3 (ref 4.7–6.1)
SEDIMENTATION RATE, ERYTHROCYTE: 10 MM/HR (ref 0–10)
SEG NEUTROPHILS: 62.6 %
SEGMENTED NEUTROPHILS ABSOLUTE COUNT: 4.5 THOU/MM3 (ref 1.8–7.7)
SODIUM BLD-SCNC: 138 MEQ/L (ref 135–145)
T3 TOTAL: 108 NG/DL (ref 72–181)
TRIGL SERPL-MCNC: 144 MG/DL (ref 0–199)
TSH SERPL DL<=0.05 MIU/L-ACNC: 1.67 UIU/ML (ref 0.4–4.2)
VITAMIN D 25-HYDROXY: 45 NG/ML (ref 30–100)
WBC # BLD: 7.2 THOU/MM3 (ref 4.8–10.8)

## 2018-08-07 PROCEDURE — 36415 COLL VENOUS BLD VENIPUNCTURE: CPT | Performed by: FAMILY MEDICINE

## 2018-08-07 NOTE — TELEPHONE ENCOUNTER
Pt lm they did not know where labs could be drawn? Called back, went to . Lm they can go to [facility] or can come back to [facility] at [address]. If any other questions can call us back.

## 2018-08-08 LAB — THYROXINE (T4): 6.3 UG/DL (ref 4.5–12)

## 2018-08-09 LAB
C-REACTIVE PROTEIN, HIGH SENSITIVITY: 3.5 MG/L
DHEAS (DHEA SULFATE): 566 UG/DL (ref 28–175)
HOMOCYSTEINE, TOTAL: 8 UMOL/L
TESTOSTERONE FREE: NORMAL
THYROID PEROXIDASE ANTIBODY: 3 IU/ML (ref 0–9)

## 2018-08-10 LAB — GLIADIN PEPTIDE IGG, IGA: NORMAL

## 2018-08-11 LAB — DHEA UNCONJUGATED: 3.26 NG/ML (ref 0.63–4.7)

## 2018-08-23 ENCOUNTER — OFFICE VISIT (OUTPATIENT)
Dept: FAMILY MEDICINE CLINIC | Age: 78
End: 2018-08-23
Payer: MEDICARE

## 2018-08-23 VITALS
SYSTOLIC BLOOD PRESSURE: 118 MMHG | OXYGEN SATURATION: 98 % | DIASTOLIC BLOOD PRESSURE: 72 MMHG | HEART RATE: 80 BPM | WEIGHT: 201.2 LBS | HEIGHT: 67 IN | BODY MASS INDEX: 31.58 KG/M2 | TEMPERATURE: 98.4 F | RESPIRATION RATE: 12 BRPM

## 2018-08-23 DIAGNOSIS — G47.9 SLEEP DIFFICULTIES: ICD-10-CM

## 2018-08-23 DIAGNOSIS — K59.01 SLOW TRANSIT CONSTIPATION: ICD-10-CM

## 2018-08-23 DIAGNOSIS — R52 ACHES: ICD-10-CM

## 2018-08-23 DIAGNOSIS — K90.49 MALABSORPTION DUE TO INTOLERANCE, NOT ELSEWHERE CLASSIFIED: ICD-10-CM

## 2018-08-23 DIAGNOSIS — R35.1 NOCTURIA: ICD-10-CM

## 2018-08-23 DIAGNOSIS — E78.5 ELEVATED LIPIDS: ICD-10-CM

## 2018-08-23 DIAGNOSIS — G44.031 INTRACTABLE EPISODIC PAROXYSMAL HEMICRANIA: ICD-10-CM

## 2018-08-23 DIAGNOSIS — R73.9 BLOOD GLUCOSE ELEVATED: ICD-10-CM

## 2018-08-23 DIAGNOSIS — T14.8XXA BRUISE: ICD-10-CM

## 2018-08-23 DIAGNOSIS — R41.3 MEMORY DIFFICULTIES: ICD-10-CM

## 2018-08-23 PROCEDURE — 99214 OFFICE O/P EST MOD 30 MIN: CPT | Performed by: FAMILY MEDICINE

## 2018-08-23 ASSESSMENT — PATIENT HEALTH QUESTIONNAIRE - PHQ9
SUM OF ALL RESPONSES TO PHQ QUESTIONS 1-9: 0
SUM OF ALL RESPONSES TO PHQ QUESTIONS 1-9: 0
SUM OF ALL RESPONSES TO PHQ9 QUESTIONS 1 & 2: 0
2. FEELING DOWN, DEPRESSED OR HOPELESS: 0
1. LITTLE INTEREST OR PLEASURE IN DOING THINGS: 0

## 2018-11-13 ENCOUNTER — HOSPITAL ENCOUNTER (OUTPATIENT)
Age: 78
Discharge: HOME | End: 2018-11-13
Payer: MEDICARE

## 2018-11-13 DIAGNOSIS — G44.031 INTRACTABLE EPISODIC PAROXYSMAL HEMICRANIA: ICD-10-CM

## 2018-11-13 DIAGNOSIS — G47.9 SLEEP DIFFICULTIES: ICD-10-CM

## 2018-11-13 DIAGNOSIS — R52 ACHES: ICD-10-CM

## 2018-11-13 DIAGNOSIS — K90.49 MALABSORPTION DUE TO INTOLERANCE, NOT ELSEWHERE CLASSIFIED: ICD-10-CM

## 2018-11-13 DIAGNOSIS — R41.3 MEMORY DIFFICULTIES: ICD-10-CM

## 2018-11-13 DIAGNOSIS — R73.9 BLOOD GLUCOSE ELEVATED: ICD-10-CM

## 2018-11-13 DIAGNOSIS — T14.8XXA BRUISE: ICD-10-CM

## 2018-11-13 DIAGNOSIS — E78.5 ELEVATED LIPIDS: ICD-10-CM

## 2018-11-13 DIAGNOSIS — R35.1 NOCTURIA: ICD-10-CM

## 2018-11-13 DIAGNOSIS — K59.01 SLOW TRANSIT CONSTIPATION: ICD-10-CM

## 2018-11-13 LAB
ANION GAP SERPL CALCULATED.3IONS-SCNC: 12 MEQ/L (ref 8–16)
AVERAGE GLUCOSE: 93 MG/DL (ref 70–126)
BASOPHILS # BLD: 0.6 %
BASOPHILS ABSOLUTE: 0 THOU/MM3 (ref 0–0.1)
BUN BLDV-MCNC: 16 MG/DL (ref 7–22)
CALCIUM SERPL-MCNC: 9 MG/DL (ref 8.5–10.5)
CHLORIDE BLD-SCNC: 104 MEQ/L (ref 98–111)
CHOLESTEROL, TOTAL: 202 MG/DL (ref 100–199)
CO2: 23 MEQ/L (ref 23–33)
CREAT SERPL-MCNC: 0.8 MG/DL (ref 0.4–1.2)
EOSINOPHIL # BLD: 9.1 %
EOSINOPHILS ABSOLUTE: 0.6 THOU/MM3 (ref 0–0.4)
ERYTHROCYTE [DISTWIDTH] IN BLOOD BY AUTOMATED COUNT: 13.8 % (ref 11.5–14.5)
ERYTHROCYTE [DISTWIDTH] IN BLOOD BY AUTOMATED COUNT: 45 FL (ref 35–45)
GFR SERPL CREATININE-BSD FRML MDRD: > 90 ML/MIN/1.73M2
GLUCOSE BLD-MCNC: 89 MG/DL (ref 70–108)
HBA1C MFR BLD: 5.1 % (ref 4.4–6.4)
HCT VFR BLD CALC: 43.5 % (ref 42–52)
HDLC SERPL-MCNC: 48 MG/DL
HEMOGLOBIN: 14.6 GM/DL (ref 14–18)
IMMATURE GRANS (ABS): 0.03 THOU/MM3 (ref 0–0.07)
IMMATURE GRANULOCYTES: 0.5 %
LDL CHOLESTEROL CALCULATED: 118 MG/DL
LYMPHOCYTES # BLD: 21.8 %
LYMPHOCYTES ABSOLUTE: 1.4 THOU/MM3 (ref 1–4.8)
MAGNESIUM: 2.2 MG/DL (ref 1.6–2.4)
MCH RBC QN AUTO: 30.1 PG (ref 26–33)
MCHC RBC AUTO-ENTMCNC: 33.6 GM/DL (ref 32.2–35.5)
MCV RBC AUTO: 89.7 FL (ref 80–94)
MONOCYTES # BLD: 9.9 %
MONOCYTES ABSOLUTE: 0.6 THOU/MM3 (ref 0.4–1.3)
NUCLEATED RED BLOOD CELLS: 0 /100 WBC
PLATELET # BLD: 242 THOU/MM3 (ref 130–400)
PMV BLD AUTO: 9.2 FL (ref 9.4–12.4)
POTASSIUM SERPL-SCNC: 3.9 MEQ/L (ref 3.5–5.2)
PTH INTACT: 57.6 PG/ML (ref 15–65)
RBC # BLD: 4.85 MILL/MM3 (ref 4.7–6.1)
SEDIMENTATION RATE, ERYTHROCYTE: 8 MM/HR (ref 0–10)
SEG NEUTROPHILS: 58.1 %
SEGMENTED NEUTROPHILS ABSOLUTE COUNT: 3.7 THOU/MM3 (ref 1.8–7.7)
SODIUM BLD-SCNC: 139 MEQ/L (ref 135–145)
TRIGL SERPL-MCNC: 179 MG/DL (ref 0–199)
VITAMIN D 25-HYDROXY: 41 NG/ML (ref 30–100)
WBC # BLD: 6.4 THOU/MM3 (ref 4.8–10.8)

## 2018-11-13 PROCEDURE — 85025 COMPLETE CBC W/AUTO DIFF WBC: CPT

## 2018-11-13 PROCEDURE — 86141 C-REACTIVE PROTEIN HS: CPT

## 2018-11-13 PROCEDURE — 82627 DEHYDROEPIANDROSTERONE: CPT

## 2018-11-13 PROCEDURE — 83970 ASSAY OF PARATHORMONE: CPT

## 2018-11-13 PROCEDURE — 82306 VITAMIN D 25 HYDROXY: CPT

## 2018-11-13 PROCEDURE — 83036 HEMOGLOBIN GLYCOSYLATED A1C: CPT

## 2018-11-13 PROCEDURE — 83735 ASSAY OF MAGNESIUM: CPT

## 2018-11-13 PROCEDURE — 83090 ASSAY OF HOMOCYSTEINE: CPT

## 2018-11-13 PROCEDURE — 80061 LIPID PANEL: CPT

## 2018-11-13 PROCEDURE — 36415 COLL VENOUS BLD VENIPUNCTURE: CPT

## 2018-11-13 PROCEDURE — 84270 ASSAY OF SEX HORMONE GLOBUL: CPT

## 2018-11-13 PROCEDURE — 82626 DEHYDROEPIANDROSTERONE: CPT

## 2018-11-13 PROCEDURE — 84403 ASSAY OF TOTAL TESTOSTERONE: CPT

## 2018-11-13 PROCEDURE — 85651 RBC SED RATE NONAUTOMATED: CPT

## 2018-11-13 PROCEDURE — 80048 BASIC METABOLIC PNL TOTAL CA: CPT

## 2018-11-16 LAB
C-REACTIVE PROTEIN, HIGH SENSITIVITY: 4 MG/L
DHEAS (DHEA SULFATE): 442 UG/DL (ref 28–175)
HOMOCYSTEINE, TOTAL: 9 UMOL/L
TESTOSTERONE FREE: NORMAL

## 2018-11-17 LAB — DHEA UNCONJUGATED: 2.55 NG/ML (ref 0.63–4.7)

## 2018-11-27 ENCOUNTER — OFFICE VISIT (OUTPATIENT)
Dept: FAMILY MEDICINE CLINIC | Age: 78
End: 2018-11-27
Payer: MEDICARE

## 2018-11-27 VITALS
RESPIRATION RATE: 10 BRPM | BODY MASS INDEX: 31.45 KG/M2 | DIASTOLIC BLOOD PRESSURE: 62 MMHG | WEIGHT: 200.4 LBS | SYSTOLIC BLOOD PRESSURE: 114 MMHG | HEIGHT: 67 IN | TEMPERATURE: 98.2 F

## 2018-11-27 DIAGNOSIS — K59.01 SLOW TRANSIT CONSTIPATION: ICD-10-CM

## 2018-11-27 DIAGNOSIS — R52 ACHES: ICD-10-CM

## 2018-11-27 DIAGNOSIS — R41.3 MEMORY DIFFICULTIES: ICD-10-CM

## 2018-11-27 DIAGNOSIS — G47.9 SLEEP DIFFICULTIES: ICD-10-CM

## 2018-11-27 DIAGNOSIS — R73.9 BLOOD GLUCOSE ELEVATED: ICD-10-CM

## 2018-11-27 DIAGNOSIS — E78.5 ELEVATED LIPIDS: ICD-10-CM

## 2018-11-27 DIAGNOSIS — K90.41 NON-CELIAC GLUTEN SENSITIVITY: ICD-10-CM

## 2018-11-27 DIAGNOSIS — R35.1 NOCTURIA: ICD-10-CM

## 2018-11-27 DIAGNOSIS — G44.031 INTRACTABLE EPISODIC PAROXYSMAL HEMICRANIA: ICD-10-CM

## 2018-11-27 DIAGNOSIS — T14.8XXA BRUISE: ICD-10-CM

## 2018-11-27 PROCEDURE — 99214 OFFICE O/P EST MOD 30 MIN: CPT | Performed by: FAMILY MEDICINE

## 2018-11-27 RX ORDER — OMEGA-3S/DHA/EPA/FISH OIL/D3 300MG-1000
800 CAPSULE ORAL
COMMUNITY

## 2018-11-27 RX ORDER — CALCIUM CARBONATE 500(1250)
500 TABLET ORAL 2 TIMES DAILY
COMMUNITY

## 2018-11-27 ASSESSMENT — ENCOUNTER SYMPTOMS: BACK PAIN: 1

## 2018-11-27 NOTE — PROGRESS NOTES
tablet Take 1 tablet by mouth daily Yes Historical Provider, MD   tamsulosin (FLOMAX) 0.4 MG capsule Take 1 capsule by mouth daily Yes Historical Provider, MD   Magnesium 400 MG CAPS Take 1 capsule by mouth 3 times daily (before meals)  Yes Historical Provider, MD   Pyridoxine HCl (VITAMIN B-6) 100 MG tablet Take 100 mg by mouth daily Yes Historical Provider, MD   NONFORMULARY Take 2 tablets by mouth daily LBS II Yes Historical Provider, MD   vitamin A 31368 UNITS capsule Take 10,000 Units by mouth daily Yes Historical Provider, MD   zinc gluconate 50 MG tablet Take 50 mg by mouth daily Yes Historical Provider, MD        No Known Allergies    Past Medical History:   Diagnosis Date    Hypertension        Past Surgical History:   Procedure Laterality Date    COLONOSCOPY      PROSTATE SURGERY      TONSILLECTOMY         Social History     Social History    Marital status:      Spouse name: N/A    Number of children: N/A    Years of education: N/A     Occupational History    Not on file. Social History Main Topics    Smoking status: Former Smoker     Packs/day: 1.00     Years: 10.00     Types: Cigarettes     Quit date: 8/23/1970    Smokeless tobacco: Never Used    Alcohol use No    Drug use: No    Sexual activity: Yes     Partners: Female     Other Topics Concern    Not on file     Social History Narrative    No narrative on file        Family History   Problem Relation Age of Onset    High Blood Pressure Mother     No Known Problems Father        Vitals:    11/27/18 1039   BP: 114/62   Site: Left Upper Arm   Position: Sitting   Cuff Size: Medium Adult   Resp: 10   Temp: 98.2 °F (36.8 °C)   TempSrc: Oral   Weight: 200 lb 6.4 oz (90.9 kg)   Height: 5' 7.25\" (1.708 m)     Estimated body mass index is 31.15 kg/m² as calculated from the following:    Height as of this encounter: 5' 7.25\" (1.708 m). Weight as of this encounter: 200 lb 6.4 oz (90.9 kg).     Physical Exam   Constitutional: He is most patients, One Daily or Item with iron  - Vit D 3  1,000IU ,   2,000 IU,  5,000 IU, can start low and buy larger on 2nd bottle     Some brands containing or derived from soy oil or corn oil are OK if not allergic to soy    Nutrients for Special Needs by Mail order for less expense;  - Elemental Iron 65 mg tabs if need more iron for low iron on labs    Usually turns bowel movements grey, green or black but not a concern  - Time released Niacin 250 mg for cold intolerance, low libido or impotence  - DHEA 10 mg, 25 mg, or 50 mg for improving DHEA levels on labs if having Fatigue    If stools too loose substitute for your Magnesium oxide using;   Magnesium citrate 200 mg tabs(NOT liquid, NOT caps) amazon. com  Magnesium gluconate 550 mg by [employer] at [employer]  Magnesium chloride foot soaks or body sprays  www.[employer]   Magnesium chloride flakes for soaks, or magnesium spray or cream    Food Drink Symptom Log;  I asked this patient to track these items and any other symptoms on their list on a weekly basis to document their progress or lack of same. This can be done on the symptom tracking sheet available to them at today's visit but looks like this:                                                      Rate on scale of 0-10 with zero = not noticeable  Symptom:                            Week 1               2                 3                 4               Etc            Hair loss    Foot cramps    Paresthesia    Aches    IBS (irritable bowel)    Constipation    Diarrhea  Nocturia    (up to bathroom at night)    Fatigue/Energy level    Stress      On the other side of the sheet they can track their food, drink, environment, activity, symptoms etc      Avoiding Latex-like proteins in my foods; Avocados, Bananas, Celery, Figs & Kiwi proteins have latex-like proteins to inflame our immune systems, see the 51 latex-like foods list  How Can I Have A Latex Allergy?   Eating foods with latex-like protein exposes us to latex allergies. Our body cannot tell the difference between these latex-like proteins and latex from rubber products since many people are allergic to fruit, vegetables and latex. Read labels on pre-packaged foods. This list to avoid is only a guide if you are known allergic to latex or have a latex rash on your chin, cheeks and lines on your neck and chest. The amount of latex is different in each food product or fruit variety. Foods to Avoid out of Season if not grown locally: Melon, Nectarine, Papaya, Cherry, Passion fruit, Plum, Chestnuts, and Tomato. Avocado, Banana, Celery, Figs, and Kiwi always contain Latex-like protein and are almost universally toxic    Whats in Season? Strawberries taste better in June than December because June is strawberry season so buy locally grown produce \"in season\" for the best flavor, cost and less Latex. Locally grown produce not only tastes great requires little of no ethylene exposure in food distribution so has less latex content. Out of season, use canned, frozen or dried since processed ripe and are latex lower!!!   Month     [place] Locally Grown Produce  January, February, March: use canned, frozen or dried fruits since lower in latex  April; asparagus, radishes  May; asparagus, broccoli, green onions, greens, peas, radishes, rhubarb  June; asparagus, beets, beans, broccoli, cabbage, cantaloupe, carrots, green onions, greens, lettuce, onions, parsley, peas, radishes, rhubarb, strawberries, watermelons  July; beans, beets, blueberries, broccoli, cabbage, cantaloupe, carrots, cauliflower, celery, cucumbers, eggplant, grapes, green onions, greens, lettuce, onions, parsley, peas, peaches, bell peppers, potatoes, radishes, summer raspberries, squash, sweet corn, tomatoes, turnips, watermelons  August; apples, beans, beets, blueberries, cabbage, cantaloupe, carrots, cauliflower, celery, cucumbers, eggplant, grapes, green onions, greens, lettuce, onions, parsley, peas, peaches, pears, bell peppers, potatoes, radishes, squash, sweet corn, tomatoes, turnips, watermelons  September; apples, beans, beets, blueberries, cabbage, cantaloupe, carrots, cauliflower, celery, cucumbers, eggplant, grapes, green onions, greens, lettuce, onions, parsley, peas, peaches, pears, bell peppers, plums, potatoes, pumpkins, radishes, fall red raspberries, squash, sweet corn, tomatoes, turnips, watermelons  October; apples, beets, broccoli, cabbage, carrots, cauliflower, celery, green onions, greens, lettuce, parsley, peas, pears, potatoes, pumpkins, radishes, fall red raspberries, squash, turnips  November; broccoli, cabbage, carrots, parsley, pears, peas  December: use canned, frozen or dried fruits since lower in latex    Up to half of latex-sensitive patients show allergic reactions to fruits (avocados, bananas, kiwifruits, papayas, peaches),   Annals of Allergy, 1994. These plants contain the same proteins that are allergens in latex. People with fruit allergies should warn physicians before undergoing procedures which may cause anaphylactic reaction if in contact with latex gloves. Some of the common foods with defined cross-reactivity to latex are avocado, banana, kiwi, chestnut, raw potato, tomato, stone fruits (e.g., peach, cherry), hazelnut, melons, celery, carrot, apple, pear, papaya, and almond. Foods with less well-defined cross-reactivity to latex are peanuts, peppers, citrus fruits, coconut, pineapple, priti, fig, passion fruit, Ugli fruit, and grape    This fruit/latex cross-reactivity is worsened by ethylene, a gas used to hasten commercial ripening. In nature, plants produce low levels of the hormone ethylene, which regulates germination, flowering, and ripening. Forced ripening by high ethylene concentrations, plants produce allergenic wound-repair proteins, which are similar to wound-repair proteins made during the tapping of rubber trees.  Sensitive individuals who

## 2019-03-11 ENCOUNTER — HOSPITAL ENCOUNTER (OUTPATIENT)
Age: 79
Discharge: HOME | End: 2019-03-11
Payer: MEDICARE

## 2019-03-11 DIAGNOSIS — K59.01 SLOW TRANSIT CONSTIPATION: ICD-10-CM

## 2019-03-11 DIAGNOSIS — E78.5 ELEVATED LIPIDS: ICD-10-CM

## 2019-03-11 DIAGNOSIS — R35.1 NOCTURIA: ICD-10-CM

## 2019-03-11 DIAGNOSIS — R41.3 MEMORY DIFFICULTIES: ICD-10-CM

## 2019-03-11 DIAGNOSIS — K90.41 NON-CELIAC GLUTEN SENSITIVITY: ICD-10-CM

## 2019-03-11 DIAGNOSIS — T14.8XXA BRUISE: ICD-10-CM

## 2019-03-11 DIAGNOSIS — G44.031 INTRACTABLE EPISODIC PAROXYSMAL HEMICRANIA: ICD-10-CM

## 2019-03-11 DIAGNOSIS — G47.9 SLEEP DIFFICULTIES: ICD-10-CM

## 2019-03-11 DIAGNOSIS — R73.9 BLOOD GLUCOSE ELEVATED: ICD-10-CM

## 2019-03-11 DIAGNOSIS — R52 ACHES: ICD-10-CM

## 2019-03-11 LAB
AVERAGE GLUCOSE: 96 MG/DL (ref 70–126)
BASOPHILS # BLD: 0.3 %
BASOPHILS ABSOLUTE: 0 THOU/MM3 (ref 0–0.1)
CALCIUM SERPL-MCNC: 9.3 MG/DL (ref 8.5–10.5)
CHOLESTEROL, TOTAL: 187 MG/DL (ref 100–199)
EOSINOPHIL # BLD: 6.8 %
EOSINOPHILS ABSOLUTE: 0.4 THOU/MM3 (ref 0–0.4)
ERYTHROCYTE [DISTWIDTH] IN BLOOD BY AUTOMATED COUNT: 13.8 % (ref 11.5–14.5)
ERYTHROCYTE [DISTWIDTH] IN BLOOD BY AUTOMATED COUNT: 44.5 FL (ref 35–45)
HBA1C MFR BLD: 5.2 % (ref 4.4–6.4)
HCT VFR BLD CALC: 44.3 % (ref 42–52)
HDLC SERPL-MCNC: 53 MG/DL
HEMOGLOBIN: 14.8 GM/DL (ref 14–18)
IMMATURE GRANS (ABS): 0.01 THOU/MM3 (ref 0–0.07)
IMMATURE GRANULOCYTES: 0.2 %
LDL CHOLESTEROL CALCULATED: 103 MG/DL
LYMPHOCYTES # BLD: 18.3 %
LYMPHOCYTES ABSOLUTE: 1.2 THOU/MM3 (ref 1–4.8)
MAGNESIUM: 2.1 MG/DL (ref 1.6–2.4)
MCH RBC QN AUTO: 29.9 PG (ref 26–33)
MCHC RBC AUTO-ENTMCNC: 33.4 GM/DL (ref 32.2–35.5)
MCV RBC AUTO: 89.5 FL (ref 80–94)
MONOCYTES # BLD: 7.6 %
MONOCYTES ABSOLUTE: 0.5 THOU/MM3 (ref 0.4–1.3)
NUCLEATED RED BLOOD CELLS: 0 /100 WBC
PLATELET # BLD: 189 THOU/MM3 (ref 130–400)
PMV BLD AUTO: 8.6 FL (ref 9.4–12.4)
PTH INTACT: 36.8 PG/ML (ref 15–65)
RBC # BLD: 4.95 MILL/MM3 (ref 4.7–6.1)
SEDIMENTATION RATE, ERYTHROCYTE: 5 MM/HR (ref 0–10)
SEG NEUTROPHILS: 66.8 %
SEGMENTED NEUTROPHILS ABSOLUTE COUNT: 4.4 THOU/MM3 (ref 1.8–7.7)
T3 TOTAL: 109 NG/DL (ref 72–181)
THYROXINE (T4): 6.3 UG/DL (ref 4.5–12)
TRIGL SERPL-MCNC: 154 MG/DL (ref 0–199)
TSH SERPL DL<=0.05 MIU/L-ACNC: 2.21 UIU/ML (ref 0.4–4.2)
VITAMIN D 25-HYDROXY: 37 NG/ML (ref 30–100)
WBC # BLD: 6.6 THOU/MM3 (ref 4.8–10.8)

## 2019-03-11 PROCEDURE — 83036 HEMOGLOBIN GLYCOSYLATED A1C: CPT

## 2019-03-11 PROCEDURE — 80061 LIPID PANEL: CPT

## 2019-03-11 PROCEDURE — 82306 VITAMIN D 25 HYDROXY: CPT

## 2019-03-11 PROCEDURE — 85025 COMPLETE CBC W/AUTO DIFF WBC: CPT

## 2019-03-11 PROCEDURE — 84443 ASSAY THYROID STIM HORMONE: CPT

## 2019-03-11 PROCEDURE — 36415 COLL VENOUS BLD VENIPUNCTURE: CPT

## 2019-03-11 PROCEDURE — 85651 RBC SED RATE NONAUTOMATED: CPT

## 2019-03-11 PROCEDURE — 82626 DEHYDROEPIANDROSTERONE: CPT

## 2019-03-11 PROCEDURE — 84436 ASSAY OF TOTAL THYROXINE: CPT

## 2019-03-11 PROCEDURE — 84403 ASSAY OF TOTAL TESTOSTERONE: CPT

## 2019-03-11 PROCEDURE — 83516 IMMUNOASSAY NONANTIBODY: CPT

## 2019-03-11 PROCEDURE — 83090 ASSAY OF HOMOCYSTEINE: CPT

## 2019-03-11 PROCEDURE — 83735 ASSAY OF MAGNESIUM: CPT

## 2019-03-11 PROCEDURE — 84480 ASSAY TRIIODOTHYRONINE (T3): CPT

## 2019-03-11 PROCEDURE — 83970 ASSAY OF PARATHORMONE: CPT

## 2019-03-11 PROCEDURE — 84270 ASSAY OF SEX HORMONE GLOBUL: CPT

## 2019-03-11 PROCEDURE — 86376 MICROSOMAL ANTIBODY EACH: CPT

## 2019-03-11 PROCEDURE — 86141 C-REACTIVE PROTEIN HS: CPT

## 2019-03-11 PROCEDURE — 82310 ASSAY OF CALCIUM: CPT

## 2019-03-11 PROCEDURE — 82627 DEHYDROEPIANDROSTERONE: CPT

## 2019-03-13 LAB
C-REACTIVE PROTEIN, HIGH SENSITIVITY: 4.5 MG/L
DHEAS (DHEA SULFATE): 628 UG/DL (ref 28–175)
GLIADIN PEPTIDE IGG, IGA: NORMAL
HOMOCYSTEINE, TOTAL: 9 UMOL/L
TESTOSTERONE FREE: NORMAL
THYROID PEROXIDASE ANTIBODY: 3.2 IU/ML (ref 0–9)

## 2019-03-14 LAB — DHEA UNCONJUGATED: 3.28 NG/ML (ref 0.63–4.7)

## 2019-03-19 ENCOUNTER — OFFICE VISIT (OUTPATIENT)
Dept: FAMILY MEDICINE CLINIC | Age: 79
End: 2019-03-19
Payer: MEDICARE

## 2019-03-19 VITALS
DIASTOLIC BLOOD PRESSURE: 78 MMHG | WEIGHT: 207.2 LBS | OXYGEN SATURATION: 98 % | BODY MASS INDEX: 32.52 KG/M2 | HEIGHT: 67 IN | HEART RATE: 75 BPM | RESPIRATION RATE: 10 BRPM | SYSTOLIC BLOOD PRESSURE: 124 MMHG | TEMPERATURE: 98.4 F

## 2019-03-19 DIAGNOSIS — G44.031 INTRACTABLE EPISODIC PAROXYSMAL HEMICRANIA: ICD-10-CM

## 2019-03-19 DIAGNOSIS — K90.49 MALABSORPTION DUE TO INTOLERANCE, NOT ELSEWHERE CLASSIFIED: Primary | ICD-10-CM

## 2019-03-19 DIAGNOSIS — R52 ACHES: ICD-10-CM

## 2019-03-19 DIAGNOSIS — R35.1 NOCTURIA: ICD-10-CM

## 2019-03-19 DIAGNOSIS — T14.8XXA BRUISE: ICD-10-CM

## 2019-03-19 DIAGNOSIS — R73.9 BLOOD GLUCOSE ELEVATED: ICD-10-CM

## 2019-03-19 DIAGNOSIS — G47.9 SLEEP DIFFICULTIES: ICD-10-CM

## 2019-03-19 DIAGNOSIS — R41.3 MEMORY DIFFICULTIES: ICD-10-CM

## 2019-03-19 DIAGNOSIS — E78.5 ELEVATED LIPIDS: ICD-10-CM

## 2019-03-19 DIAGNOSIS — K90.41 NON-CELIAC GLUTEN SENSITIVITY: ICD-10-CM

## 2019-03-19 DIAGNOSIS — K59.01 SLOW TRANSIT CONSTIPATION: ICD-10-CM

## 2019-03-19 DIAGNOSIS — R73.09 LOW GLUCOSE LEVEL: ICD-10-CM

## 2019-03-19 PROCEDURE — 99214 OFFICE O/P EST MOD 30 MIN: CPT | Performed by: FAMILY MEDICINE

## 2019-03-19 ASSESSMENT — PATIENT HEALTH QUESTIONNAIRE - PHQ9
2. FEELING DOWN, DEPRESSED OR HOPELESS: 0
SUM OF ALL RESPONSES TO PHQ QUESTIONS 1-9: 0
SUM OF ALL RESPONSES TO PHQ9 QUESTIONS 1 & 2: 0
1. LITTLE INTEREST OR PLEASURE IN DOING THINGS: 0
SUM OF ALL RESPONSES TO PHQ QUESTIONS 1-9: 0

## 2019-05-22 ENCOUNTER — TELEPHONE (OUTPATIENT)
Dept: FAMILY MEDICINE CLINIC | Age: 79
End: 2019-05-22

## 2019-05-22 NOTE — TELEPHONE ENCOUNTER
Pt found out through blood work that he has prostate cancer. He had a biopsy yesterday and gets the results next Tuesday. He would like Dr Freddy Jenkins recommendation other than removal of the prostate. Please advise.

## 2021-09-05 ENCOUNTER — HOSPITAL ENCOUNTER (EMERGENCY)
Age: 81
Discharge: HOME | DRG: 124 | End: 2021-09-05
Attending: INTERNAL MEDICINE
Payer: MEDICARE

## 2021-09-05 ENCOUNTER — APPOINTMENT (OUTPATIENT)
Dept: CT IMAGING | Age: 81
DRG: 124 | End: 2021-09-05
Payer: MEDICARE

## 2021-09-05 VITALS
RESPIRATION RATE: 18 BRPM | OXYGEN SATURATION: 96 % | HEIGHT: 67 IN | TEMPERATURE: 98.3 F | HEART RATE: 73 BPM | BODY MASS INDEX: 32.96 KG/M2 | WEIGHT: 210 LBS | DIASTOLIC BLOOD PRESSURE: 96 MMHG | SYSTOLIC BLOOD PRESSURE: 160 MMHG

## 2021-09-05 DIAGNOSIS — S09.93XA FACIAL INJURY, INITIAL ENCOUNTER: Primary | ICD-10-CM

## 2021-09-05 DIAGNOSIS — H10.31 ACUTE BACTERIAL CONJUNCTIVITIS OF RIGHT EYE: ICD-10-CM

## 2021-09-05 DIAGNOSIS — S09.90XA CLOSED HEAD INJURY, INITIAL ENCOUNTER: ICD-10-CM

## 2021-09-05 LAB
ALBUMIN SERPL-MCNC: 4 G/DL (ref 3.5–5.1)
ALLEN TEST: POSITIVE
ALP BLD-CCNC: 96 U/L (ref 38–126)
ALT SERPL-CCNC: 12 U/L (ref 11–66)
ANION GAP SERPL CALCULATED.3IONS-SCNC: 11 MEQ/L (ref 8–16)
AST SERPL-CCNC: 18 U/L (ref 5–40)
BASE EXCESS (CALCULATED): -0.5 MMOL/L (ref -2.5–2.5)
BASOPHILS # BLD: 0.6 %
BASOPHILS ABSOLUTE: 0 THOU/MM3 (ref 0–0.1)
BILIRUB SERPL-MCNC: 0.8 MG/DL (ref 0.3–1.2)
BUN BLDV-MCNC: 14 MG/DL (ref 7–22)
CALCIUM SERPL-MCNC: 9.2 MG/DL (ref 8.5–10.5)
CHLORIDE BLD-SCNC: 103 MEQ/L (ref 98–111)
CO2: 23 MEQ/L (ref 23–33)
COLLECTED BY:: ABNORMAL
CREAT SERPL-MCNC: 0.9 MG/DL (ref 0.4–1.2)
DEVICE: ABNORMAL
EOSINOPHIL # BLD: 4.3 %
EOSINOPHILS ABSOLUTE: 0.3 THOU/MM3 (ref 0–0.4)
ERYTHROCYTE [DISTWIDTH] IN BLOOD BY AUTOMATED COUNT: 13.6 % (ref 11.5–14.5)
ERYTHROCYTE [DISTWIDTH] IN BLOOD BY AUTOMATED COUNT: 43.7 FL (ref 35–45)
GFR SERPL CREATININE-BSD FRML MDRD: 81 ML/MIN/1.73M2
GLUCOSE BLD-MCNC: 93 MG/DL (ref 70–108)
HCO3: 24 MMOL/L (ref 23–28)
HCT VFR BLD CALC: 46.4 % (ref 42–52)
HEMOGLOBIN: 15.6 GM/DL (ref 14–18)
IMMATURE GRANS (ABS): 0.03 THOU/MM3 (ref 0–0.07)
IMMATURE GRANULOCYTES: 0.4 %
LIPASE: 10.9 U/L (ref 5.6–51.3)
LYMPHOCYTES # BLD: 12.3 %
LYMPHOCYTES ABSOLUTE: 0.8 THOU/MM3 (ref 1–4.8)
MCH RBC QN AUTO: 29.5 PG (ref 26–33)
MCHC RBC AUTO-ENTMCNC: 33.6 GM/DL (ref 32.2–35.5)
MCV RBC AUTO: 87.7 FL (ref 80–94)
MONOCYTES # BLD: 8.8 %
MONOCYTES ABSOLUTE: 0.6 THOU/MM3 (ref 0.4–1.3)
NUCLEATED RED BLOOD CELLS: 0 /100 WBC
O2 SATURATION: 91 %
OSMOLALITY CALCULATION: 274 MOSMOL/KG (ref 275–300)
PCO2: 38 MMHG (ref 35–45)
PH BLOOD GAS: 7.41 (ref 7.35–7.45)
PLATELET # BLD: 202 THOU/MM3 (ref 130–400)
PMV BLD AUTO: 8.5 FL (ref 9.4–12.4)
PO2: 61 MMHG (ref 71–104)
POTASSIUM REFLEX MAGNESIUM: 3.9 MEQ/L (ref 3.5–5.2)
RBC # BLD: 5.29 MILL/MM3 (ref 4.7–6.1)
SEG NEUTROPHILS: 73.6 %
SEGMENTED NEUTROPHILS ABSOLUTE COUNT: 4.9 THOU/MM3 (ref 1.8–7.7)
SODIUM BLD-SCNC: 137 MEQ/L (ref 135–145)
SOURCE, BLOOD GAS: ABNORMAL
TOTAL CK: 39 U/L (ref 55–170)
TOTAL PROTEIN: 7.2 G/DL (ref 6.1–8)
TROPONIN T: < 0.01 NG/ML
WBC # BLD: 6.7 THOU/MM3 (ref 4.8–10.8)

## 2021-09-05 PROCEDURE — 36600 WITHDRAWAL OF ARTERIAL BLOOD: CPT

## 2021-09-05 PROCEDURE — 99284 EMERGENCY DEPT VISIT MOD MDM: CPT

## 2021-09-05 PROCEDURE — 6370000000 HC RX 637 (ALT 250 FOR IP): Performed by: INTERNAL MEDICINE

## 2021-09-05 PROCEDURE — 85025 COMPLETE CBC W/AUTO DIFF WBC: CPT

## 2021-09-05 PROCEDURE — 70450 CT HEAD/BRAIN W/O DYE: CPT

## 2021-09-05 PROCEDURE — 82803 BLOOD GASES ANY COMBINATION: CPT

## 2021-09-05 PROCEDURE — 70486 CT MAXILLOFACIAL W/O DYE: CPT

## 2021-09-05 PROCEDURE — 82550 ASSAY OF CK (CPK): CPT

## 2021-09-05 PROCEDURE — 84484 ASSAY OF TROPONIN QUANT: CPT

## 2021-09-05 PROCEDURE — 83874 ASSAY OF MYOGLOBIN: CPT

## 2021-09-05 PROCEDURE — 72125 CT NECK SPINE W/O DYE: CPT

## 2021-09-05 PROCEDURE — 36415 COLL VENOUS BLD VENIPUNCTURE: CPT

## 2021-09-05 PROCEDURE — 80053 COMPREHEN METABOLIC PANEL: CPT

## 2021-09-05 PROCEDURE — 83690 ASSAY OF LIPASE: CPT

## 2021-09-05 RX ORDER — TETRACAINE HYDROCHLORIDE 5 MG/ML
1 SOLUTION OPHTHALMIC ONCE
Status: DISCONTINUED | OUTPATIENT
Start: 2021-09-05 | End: 2021-09-05 | Stop reason: HOSPADM

## 2021-09-05 RX ORDER — GENTAMICIN SULFATE 3 MG/ML
1 SOLUTION/ DROPS OPHTHALMIC
Status: DISCONTINUED | OUTPATIENT
Start: 2021-09-05 | End: 2021-09-05 | Stop reason: HOSPADM

## 2021-09-05 RX ORDER — GENTAMICIN SULFATE 3 MG/ML
1 SOLUTION/ DROPS OPHTHALMIC ONCE
Status: COMPLETED | OUTPATIENT
Start: 2021-09-05 | End: 2021-09-05

## 2021-09-05 RX ADMIN — GENTAMICIN SULFATE 1 DROP: 3 SOLUTION OPHTHALMIC at 11:50

## 2021-09-05 NOTE — ED NOTES
ED nurse-to-nurse bedside report    Chief Complaint   Patient presents with    Facial Injury      LOC: alert and orientated to name, place, date  Vital signs   Vitals:    09/05/21 1010   BP: (!) 160/96   Pulse: 77   Resp: 16   Temp: 98.3 °F (36.8 °C)   TempSrc: Oral   SpO2: 97%   Weight: 210 lb (95.3 kg)   Height: 5' 7\" (1.702 m)      Pain:    Pain Interventions: denies pain  Pain Goal:   Oxygen: No    Current needs required gentamicin   Telemetry: No  LDAs:    Continuous Infusions:   Mobility: Independent  [place] Fall Risk Score:    Fall Risk 8/23/2018 7/11/2017 7/11/2017   2 or more falls in past year? no no no   Fall with injury in past year? no yes no     Fall Interventions: call light within reach, bed rails up  Report given to: Chelly Quick RN  09/05/21 [id number]

## 2021-09-05 NOTE — ED PROVIDER NOTES
eMERGENCY dEPARTMENT eNCOUnter      CHIEF COMPLAINT    Chief Complaint   Patient presents with    Facial Injury       HPI    Be Mazariegos is a [de-identified] y.o. male who presents the emergency department after a fall. Patient does not remember when he fell. Patient said it may be last night or maybe before that. Patient found himself sitting in a chair after the fall. Patient also has been complaining of swelling of his right eye with some purulent discharge. Patient does not have any weakness tingling numbness in any part of the body. Patient is not complaining of chest pain or chest pressure. There is no nausea or vomiting. Patient does not have any dysuria or frequency.   Patient is not complaining of any pain in his forehead    PAST MEDICAL HISTORY    Past Medical History:   Diagnosis Date    Hypertension        SURGICAL HISTORY    Past Surgical History:   Procedure Laterality Date    COLONOSCOPY      PROSTATE SURGERY      TONSILLECTOMY         CURRENT MEDICATIONS    Current Outpatient Rx   Medication Sig Dispense Refill    Cyanocobalamin (VITAMIN B 12 PO) Take by mouth daily      vitamin D3 (CHOLECALCIFEROL) 400 units TABS tablet Take 800 Units by mouth 2 times daily (before meals)       calcium carbonate (OSCAL) 500 MG TABS tablet Take 500 mg by mouth 2 times daily       baclofen (LIORESAL) 10 MG tablet Take 20 mg by mouth 3 times daily       Omega-3 Fatty Acids (FISH OIL) 1000 MG CAPS Take 3,000 mg by mouth 3 times daily       ascorbic acid (VITAMIN C) 1000 MG tablet Take 1,000 mg by mouth 4 times daily (before meals and nightly) For cholesterols      Lysine 500 MG TABS Take 1 tablet by mouth 4 times daily (before meals and nightly) For cholesterols      Proline 500 MG CAPS Take 1 capsule by mouth 4 times daily (before meals and nightly) For cholesterols      B Complex-Folic Acid (S-210 BALANCED TR PO) Take 1 tablet by mouth 4 times daily (before meals and nightly) Natures Made B-100 time released  DHEA 10 MG TABS Take 50 mg by mouth daily       Cinnamon 500 MG CAPS Take 3 capsules by mouth 4 times daily Wolf with 2 ounces of half and half from [employer]      Levomefolic Acid (5-MTHF PO) Take 10 mg by mouth every morning (before breakfast) Metabolic Maintenance at [facility] lisinopril (PRINIVIL;ZESTRIL) 20 MG tablet Take 1 tablet by mouth daily      tamsulosin (FLOMAX) 0.4 MG capsule Take 1 capsule by mouth daily      Magnesium 400 MG CAPS Take 1 capsule by mouth 4 times daily (before meals and nightly)       Pyridoxine HCl (VITAMIN B-6) 100 MG tablet Take 200 mg by mouth daily       NONFORMULARY Take 2 tablets by mouth daily LBS II      vitamin A 53792 UNITS capsule Take 10,000 Units by mouth daily      zinc gluconate 50 MG tablet Take 50 mg by mouth daily         ALLERGIES    No Known Allergies    FAMILY HISTORY    Family History   Problem Relation Age of Onset    High Blood Pressure Mother     No Known Problems Father        SOCIAL HISTORY    Social History     Socioeconomic History    Marital status:      Spouse name: None    Number of children: None    Years of education: None    Highest education level: None   Occupational History    None   Tobacco Use    Smoking status: Former Smoker     Packs/day: 1.00     Years: 10.00     Pack years: 10.00     Types: Cigarettes     Quit date: 1970     Years since quittin.0    Smokeless tobacco: Never Used   Substance and Sexual Activity    Alcohol use: No    Drug use: No    Sexual activity: Yes     Partners: Female   Other Topics Concern    None   Social History Narrative    None     Social Determinants of Health     Financial Resource Strain:     Difficulty of Paying Living Expenses:    Food Insecurity:     Worried About Running Out of Food in the Last Year:     Ran Out of Food in the Last Year:    Transportation Needs:     Lack of Transportation (Medical):      Lack of Transportation (Non-Medical):    Physical Activity:     Days of Exercise per Week:     Minutes of Exercise per Session:    Stress:     Feeling of Stress :    Social Connections:     Frequency of Communication with Friends and Family:     Frequency of Social Gatherings with Friends and Family:     Attends Yazidism Services:     Active Member of Clubs or Organizations:     Attends Club or Organization Meetings:     Marital Status:    Intimate Partner Violence:     Fear of Current or Ex-Partner:     Emotionally Abused:     Physically Abused:     Sexually Abused:        REVIEW OF SYSTEMS    Constitutional:  Denies fever, chills, weight loss or weakness   Eyes:  Denies photophobia or discharge   HENT:  Denies sore throat or ear pain   Respiratory:  Denies cough or shortness of breath   Cardiovascular:  Denies chest pain, palpitations or swelling   GI:  Denies abdominal pain, nausea, vomiting, or diarrhea   Musculoskeletal:  Denies back pain   Skin:  Denies rash   Neurologic:  Denies headache, focal weakness or sensory changes   Endocrine:  Denies polyuria or polydypsia   Lymphatic:  Denies swollen glands   Psychiatric:  Denies depression, suicidal ideation or homicidal ideation   All systems negative except as marked. PHYSICAL EXAM    VITAL SIGNS: BP (!) 160/96   Pulse 73   Temp 98.3 °F (36.8 °C) (Oral)   Resp 18   Ht 5' 7\" (1.702 m)   Wt 210 lb (95.3 kg)   SpO2 96%   BMI 32.89 kg/m²    Constitutional:  Well developed, Well nourished, No acute distress, Non-toxic appearance. HENT:  Normocephalic, abrasion on his right side of the forehead, swelling of his right eye  Bilateral external ears normal, Oropharynx moist, No oral exudates, Nose normal. Neck- Normal range of motion, No tenderness, Supple, No stridor. Eyes:  PERRL, EOMI, Conjunctiva normal, with purulent material coming on his right eye. Corneal abrasion between 3:00 and 8:00 in the inferior part of his right cornea.   This was done with fluorescein staining  Respiratory: Normal breath sounds, No respiratory distress, No wheezing, No chest tenderness. Cardiovascular:  Normal heart rate, Normal rhythm, No murmurs, No rubs, No gallops. GI:  Bowel sounds normal, Soft, No tenderness, No masses, No pulsatile masses. : External genitalia appear normal, No masses or lesions. No discharge. No CVA tenderness. Musculoskeletal:  Intact distal pulses, No edema, No tenderness, No cyanosis, No clubbing. Good range of motion in all major joints. No tenderness to palpation or major deformities noted. Back- No tenderness. Integument:  Warm, Dry, No erythema, No rash. Lymphatic:  No lymphadenopathy noted. Neurologic:  Alert & oriented x 3, Normal motor function, Normal sensory function, No focal deficits noted. Psychiatric:  Affect normal, Judgment normal, Mood normal.       RADIOLOGY    CT Head WO Contrast   Final Result       1. No evidence of acute intracranial abnormality. 2. Right-sided facial swelling without evidence of acute osseous injury of the face. **This report has been created using voice recognition software. It may contain minor errors which are inherent in voice recognition technology. **      Final report electronically signed by Dr. Francisco Magdaleno MD on 9/5/2021 11:24 AM      CT Cervical Spine WO Contrast   Final Result    No evidence of acute osseous injury of the cervical spine. **This report has been created using voice recognition software. It may contain minor errors which are inherent in voice recognition technology. **      Final report electronically signed by Dr. Francisco Magdaleno MD on 9/5/2021 11:26 AM      CT FACIAL BONES WO CONTRAST   Final Result       1. No evidence of acute intracranial abnormality. 2. Right-sided facial swelling without evidence of acute osseous injury of the face. **This report has been created using voice recognition software.  It may contain minor errors which are inherent in voice recognition technology. **      Final report electronically signed by Dr. Madai Srivastava MD on 9/5/2021 11:24 AM          LABS    Labs Reviewed   CBC WITH AUTO DIFFERENTIAL - Abnormal; Notable for the following components:       Result Value    MPV 8.5 (*)     Lymphocytes Absolute 0.8 (*)     All other components within normal limits   CK - Abnormal; Notable for the following components: Total CK 39 (*)     All other components within normal limits   BLOOD GAS, ARTERIAL - Abnormal; Notable for the following components:    PO2 61 (*)     All other components within normal limits   GLOMERULAR FILTRATION RATE, ESTIMATED - Abnormal; Notable for the following components:    Est, Glom Filt Rate 81 (*)     All other components within normal limits   OSMOLALITY - Abnormal; Notable for the following components:    Osmolality Calc 274.0 (*)     All other components within normal limits   COMPREHENSIVE METABOLIC PANEL W/ REFLEX TO MG FOR LOW K   LIPASE   TROPONIN   ANION GAP   URINE RT REFLEX TO CULTURE   MYOGLOBIN, SERUM     ED COURSE & MEDICAL DECISION MAKING    Pertinent Labs & Imaging studies reviewed. (See chart for details)  Patient's right eye was cleaned since it does a lot of mucus and purulent material.  Patient was able to see normally through that eye. Patient also has an abrasion on his forehead on the right side which looks like shingles. Patient's eye was examined after staining with fluorescein. It showed corneal abrasion. Patient was given gentamicin drops in the emergency department. Patient will be provided these drops on discharge along with ointment to be used at night. Patient is advised to follow-up with [address] [place] [address] ophthalmology. Patient is also advised to come back to the emergency department if he starts having any headache weakness tingling numbness strokelike symptoms or altered vision. Patient CT scan of the head, facial bones and neck was negative for any acute pathology.   Labs

## 2021-09-05 NOTE — ED NOTES
Pt up in bed and medicated per MAR. Patient updated on plan of care at this time and expresses no concerns regarding treatment. Patient VSS. Respirations are regular and unlabored, patient is alert and oriented x4. Patient bed rails up x2 and call light within reach. Will continue to monitor.        Dante Tran RN  09/05/21 9905

## 2021-09-05 NOTE — ED NOTES
Bedside shift report given to this RN at this time by Ruby Fiore RN. Patient is up in bed and updated on plan of care at this time. Patient is alert and oriented x4 and respirations are regular and unlabored.  Call light within reach       Ambrocio Otero RN  09/05/21 4477

## 2021-09-05 NOTE — ED NOTES
Patient presents to ED for right sided facial injury. States he believes he fell last night or the night before but does not remember anything about that fall. States he woke up in the chair \"with my face like this. \"  Swelling noted to right eye without bruising and is matted with drainage. Wounds noted above right eyebrow with crusted scabs and yellow/clear drainage. Scattered small blisters noted in right hairline. Denies any pain. Shows no signs of distress. Skin warm and dry. Respirations even and unlabored.       Krupa Scott RN  09/05/21 6026

## 2021-09-06 ENCOUNTER — HOSPITAL ENCOUNTER (INPATIENT)
Age: 81
LOS: 14 days | Discharge: NURSING, REHAB OR LONG-TERM CARE | DRG: 124 | End: 2021-09-20
Attending: INTERNAL MEDICINE
Payer: MEDICARE

## 2021-09-06 DIAGNOSIS — B02.30 HERPES ZOSTER OPHTHALMICUS: Primary | ICD-10-CM

## 2021-09-06 LAB
ANION GAP SERPL CALCULATED.3IONS-SCNC: 12 MEQ/L (ref 8–16)
BASOPHILS # BLD: 0.4 %
BASOPHILS ABSOLUTE: 0 THOU/MM3 (ref 0–0.1)
BUN BLDV-MCNC: 13 MG/DL (ref 7–22)
CALCIUM SERPL-MCNC: 9.1 MG/DL (ref 8.5–10.5)
CHLORIDE BLD-SCNC: 97 MEQ/L (ref 98–111)
CO2: 23 MEQ/L (ref 23–33)
CREAT SERPL-MCNC: 0.7 MG/DL (ref 0.4–1.2)
EOSINOPHIL # BLD: 1.8 %
EOSINOPHILS ABSOLUTE: 0.1 THOU/MM3 (ref 0–0.4)
ERYTHROCYTE [DISTWIDTH] IN BLOOD BY AUTOMATED COUNT: 13.5 % (ref 11.5–14.5)
ERYTHROCYTE [DISTWIDTH] IN BLOOD BY AUTOMATED COUNT: 44.1 FL (ref 35–45)
GFR SERPL CREATININE-BSD FRML MDRD: > 90 ML/MIN/1.73M2
GLUCOSE BLD-MCNC: 109 MG/DL (ref 70–108)
HCT VFR BLD CALC: 45.9 % (ref 42–52)
HEMOGLOBIN: 15.3 GM/DL (ref 14–18)
IMMATURE GRANS (ABS): 0.04 THOU/MM3 (ref 0–0.07)
IMMATURE GRANULOCYTES: 0.6 %
LYMPHOCYTES # BLD: 12.2 %
LYMPHOCYTES ABSOLUTE: 0.8 THOU/MM3 (ref 1–4.8)
MCH RBC QN AUTO: 29.5 PG (ref 26–33)
MCHC RBC AUTO-ENTMCNC: 33.3 GM/DL (ref 32.2–35.5)
MCV RBC AUTO: 88.6 FL (ref 80–94)
MONOCYTES # BLD: 9.9 %
MONOCYTES ABSOLUTE: 0.7 THOU/MM3 (ref 0.4–1.3)
NUCLEATED RED BLOOD CELLS: 0 /100 WBC
OSMOLALITY CALCULATION: 265.2 MOSMOL/KG (ref 275–300)
PLATELET # BLD: 192 THOU/MM3 (ref 130–400)
PMV BLD AUTO: 8.8 FL (ref 9.4–12.4)
POTASSIUM SERPL-SCNC: 3.9 MEQ/L (ref 3.5–5.2)
RBC # BLD: 5.18 MILL/MM3 (ref 4.7–6.1)
SEG NEUTROPHILS: 75.1 %
SEGMENTED NEUTROPHILS ABSOLUTE COUNT: 5 THOU/MM3 (ref 1.8–7.7)
SODIUM BLD-SCNC: 132 MEQ/L (ref 135–145)
WBC # BLD: 6.7 THOU/MM3 (ref 4.8–10.8)

## 2021-09-06 PROCEDURE — 6360000002 HC RX W HCPCS: Performed by: PHYSICIAN ASSISTANT

## 2021-09-06 PROCEDURE — 5A1D70Z PERFORMANCE OF URINARY FILTRATION, INTERMITTENT, LESS THAN 6 HOURS PER DAY: ICD-10-PCS | Performed by: INTERNAL MEDICINE

## 2021-09-06 PROCEDURE — 2580000003 HC RX 258: Performed by: PHYSICIAN ASSISTANT

## 2021-09-06 PROCEDURE — 6360000002 HC RX W HCPCS: Performed by: FAMILY MEDICINE

## 2021-09-06 PROCEDURE — 85025 COMPLETE CBC W/AUTO DIFF WBC: CPT

## 2021-09-06 PROCEDURE — 80048 BASIC METABOLIC PNL TOTAL CA: CPT

## 2021-09-06 PROCEDURE — 96374 THER/PROPH/DIAG INJ IV PUSH: CPT

## 2021-09-06 PROCEDURE — 36415 COLL VENOUS BLD VENIPUNCTURE: CPT

## 2021-09-06 PROCEDURE — 2580000003 HC RX 258: Performed by: FAMILY MEDICINE

## 2021-09-06 PROCEDURE — 99282 EMERGENCY DEPT VISIT SF MDM: CPT

## 2021-09-06 PROCEDURE — 96375 TX/PRO/DX INJ NEW DRUG ADDON: CPT

## 2021-09-06 PROCEDURE — 6370000000 HC RX 637 (ALT 250 FOR IP): Performed by: FAMILY MEDICINE

## 2021-09-06 PROCEDURE — 6360000002 HC RX W HCPCS

## 2021-09-06 PROCEDURE — 99222 1ST HOSP IP/OBS MODERATE 55: CPT | Performed by: FAMILY MEDICINE

## 2021-09-06 PROCEDURE — 2060000000 HC ICU INTERMEDIATE R&B

## 2021-09-06 RX ORDER — POLYETHYLENE GLYCOL 3350 17 G/17G
17 POWDER, FOR SOLUTION ORAL DAILY PRN
Status: DISCONTINUED | OUTPATIENT
Start: 2021-09-06 | End: 2021-09-13

## 2021-09-06 RX ORDER — SODIUM CHLORIDE 9 MG/ML
25 INJECTION, SOLUTION INTRAVENOUS PRN
Status: DISCONTINUED | OUTPATIENT
Start: 2021-09-06 | End: 2021-09-20 | Stop reason: HOSPADM

## 2021-09-06 RX ORDER — BACLOFEN 10 MG/1
20 TABLET ORAL 3 TIMES DAILY
Status: DISCONTINUED | OUTPATIENT
Start: 2021-09-06 | End: 2021-09-20 | Stop reason: HOSPADM

## 2021-09-06 RX ORDER — TAMSULOSIN HYDROCHLORIDE 0.4 MG/1
0.4 CAPSULE ORAL DAILY
Status: DISCONTINUED | OUTPATIENT
Start: 2021-09-06 | End: 2021-09-20 | Stop reason: HOSPADM

## 2021-09-06 RX ORDER — ACETAMINOPHEN 650 MG/1
650 SUPPOSITORY RECTAL EVERY 6 HOURS PRN
Status: DISCONTINUED | OUTPATIENT
Start: 2021-09-06 | End: 2021-09-20 | Stop reason: HOSPADM

## 2021-09-06 RX ORDER — ACETAMINOPHEN 325 MG/1
650 TABLET ORAL EVERY 6 HOURS PRN
Status: DISCONTINUED | OUTPATIENT
Start: 2021-09-06 | End: 2021-09-20 | Stop reason: HOSPADM

## 2021-09-06 RX ORDER — ZINC SULFATE 50(220)MG
50 CAPSULE ORAL DAILY
Status: DISCONTINUED | OUTPATIENT
Start: 2021-09-06 | End: 2021-09-20 | Stop reason: HOSPADM

## 2021-09-06 RX ORDER — CHLORAL HYDRATE 500 MG
3000 CAPSULE ORAL 3 TIMES DAILY
Status: DISCONTINUED | OUTPATIENT
Start: 2021-09-06 | End: 2021-09-06 | Stop reason: RX

## 2021-09-06 RX ORDER — ONDANSETRON 2 MG/ML
4 INJECTION INTRAMUSCULAR; INTRAVENOUS EVERY 6 HOURS PRN
Status: DISCONTINUED | OUTPATIENT
Start: 2021-09-06 | End: 2021-09-20 | Stop reason: HOSPADM

## 2021-09-06 RX ORDER — ONDANSETRON 4 MG/1
4 TABLET, ORALLY DISINTEGRATING ORAL EVERY 8 HOURS PRN
Status: DISCONTINUED | OUTPATIENT
Start: 2021-09-06 | End: 2021-09-20 | Stop reason: HOSPADM

## 2021-09-06 RX ORDER — GABAPENTIN 300 MG/1
300 CAPSULE ORAL 3 TIMES DAILY PRN
Status: DISCONTINUED | OUTPATIENT
Start: 2021-09-06 | End: 2021-09-20 | Stop reason: HOSPADM

## 2021-09-06 RX ORDER — MORPHINE SULFATE 4 MG/ML
INJECTION, SOLUTION INTRAMUSCULAR; INTRAVENOUS
Status: COMPLETED
Start: 2021-09-06 | End: 2021-09-06

## 2021-09-06 RX ORDER — SODIUM CHLORIDE 0.9 % (FLUSH) 0.9 %
5-40 SYRINGE (ML) INJECTION PRN
Status: DISCONTINUED | OUTPATIENT
Start: 2021-09-06 | End: 2021-09-20 | Stop reason: HOSPADM

## 2021-09-06 RX ORDER — CALCIUM CARBONATE 500(1250)
500 TABLET ORAL 2 TIMES DAILY
Status: DISCONTINUED | OUTPATIENT
Start: 2021-09-06 | End: 2021-09-20 | Stop reason: HOSPADM

## 2021-09-06 RX ORDER — VITAMIN B COMPLEX
1000 TABLET ORAL
Status: DISCONTINUED | OUTPATIENT
Start: 2021-09-07 | End: 2021-09-20 | Stop reason: HOSPADM

## 2021-09-06 RX ORDER — MELATONIN 3 MG
50 TABLET ORAL DAILY
Status: DISCONTINUED | OUTPATIENT
Start: 2021-09-06 | End: 2021-09-06 | Stop reason: RX

## 2021-09-06 RX ORDER — SODIUM CHLORIDE 0.9 % (FLUSH) 0.9 %
5-40 SYRINGE (ML) INJECTION EVERY 12 HOURS SCHEDULED
Status: DISCONTINUED | OUTPATIENT
Start: 2021-09-06 | End: 2021-09-20 | Stop reason: HOSPADM

## 2021-09-06 RX ORDER — ONDANSETRON 2 MG/ML
4 INJECTION INTRAMUSCULAR; INTRAVENOUS ONCE
Status: COMPLETED | OUTPATIENT
Start: 2021-09-06 | End: 2021-09-06

## 2021-09-06 RX ORDER — MORPHINE SULFATE 4 MG/ML
4 INJECTION, SOLUTION INTRAMUSCULAR; INTRAVENOUS ONCE
Status: COMPLETED | OUTPATIENT
Start: 2021-09-06 | End: 2021-09-06

## 2021-09-06 RX ORDER — 0.9 % SODIUM CHLORIDE 0.9 %
500 INTRAVENOUS SOLUTION INTRAVENOUS ONCE
Status: COMPLETED | OUTPATIENT
Start: 2021-09-06 | End: 2021-09-06

## 2021-09-06 RX ORDER — MORPHINE SULFATE 4 MG/ML
4 INJECTION, SOLUTION INTRAMUSCULAR; INTRAVENOUS EVERY 4 HOURS PRN
Status: DISCONTINUED | OUTPATIENT
Start: 2021-09-06 | End: 2021-09-20 | Stop reason: HOSPADM

## 2021-09-06 RX ORDER — SODIUM CHLORIDE 9 MG/ML
INJECTION, SOLUTION INTRAVENOUS CONTINUOUS
Status: DISCONTINUED | OUTPATIENT
Start: 2021-09-06 | End: 2021-09-10

## 2021-09-06 RX ORDER — LANOLIN ALCOHOL/MO/W.PET/CERES
200 CREAM (GRAM) TOPICAL DAILY
Status: DISCONTINUED | OUTPATIENT
Start: 2021-09-06 | End: 2021-09-20 | Stop reason: HOSPADM

## 2021-09-06 RX ORDER — LISINOPRIL 20 MG/1
20 TABLET ORAL DAILY
Status: DISCONTINUED | OUTPATIENT
Start: 2021-09-06 | End: 2021-09-14

## 2021-09-06 RX ADMIN — MORPHINE SULFATE 4 MG: 4 INJECTION, SOLUTION INTRAMUSCULAR; INTRAVENOUS at 16:36

## 2021-09-06 RX ADMIN — Medication 400 MG: at 21:15

## 2021-09-06 RX ADMIN — Medication 200 MG: at 21:08

## 2021-09-06 RX ADMIN — CALCIUM 500 MG: 500 TABLET ORAL at 21:14

## 2021-09-06 RX ADMIN — Medication 50 MG: at 21:20

## 2021-09-06 RX ADMIN — ACYCLOVIR SODIUM 950 MG: 50 INJECTION, SOLUTION INTRAVENOUS at 16:37

## 2021-09-06 RX ADMIN — ACYCLOVIR SODIUM 950 MG: 50 INJECTION, SOLUTION INTRAVENOUS at 21:55

## 2021-09-06 RX ADMIN — ONDANSETRON 4 MG: 2 INJECTION INTRAMUSCULAR; INTRAVENOUS at 12:25

## 2021-09-06 RX ADMIN — TAMSULOSIN HYDROCHLORIDE 0.4 MG: 0.4 CAPSULE ORAL at 21:14

## 2021-09-06 RX ADMIN — MORPHINE SULFATE 4 MG: 4 INJECTION, SOLUTION INTRAMUSCULAR; INTRAVENOUS at 21:55

## 2021-09-06 RX ADMIN — SODIUM CHLORIDE 500 ML: 9 INJECTION, SOLUTION INTRAVENOUS at 12:25

## 2021-09-06 RX ADMIN — BACLOFEN 20 MG: 10 TABLET ORAL at 21:15

## 2021-09-06 RX ADMIN — MORPHINE SULFATE 4 MG: 4 INJECTION, SOLUTION INTRAMUSCULAR; INTRAVENOUS at 12:25

## 2021-09-06 RX ADMIN — SODIUM CHLORIDE: 9 INJECTION, SOLUTION INTRAVENOUS at 19:27

## 2021-09-06 ASSESSMENT — PAIN DESCRIPTION - ONSET: ONSET: ON-GOING

## 2021-09-06 ASSESSMENT — PAIN SCALES - GENERAL
PAINLEVEL_OUTOF10: 6
PAINLEVEL_OUTOF10: 0
PAINLEVEL_OUTOF10: 5
PAINLEVEL_OUTOF10: 7
PAINLEVEL_OUTOF10: 3

## 2021-09-06 ASSESSMENT — ENCOUNTER SYMPTOMS
EYE REDNESS: 1
SHORTNESS OF BREATH: 0
ABDOMINAL PAIN: 0
EYE DISCHARGE: 1
PHOTOPHOBIA: 1
EYE PAIN: 1
DIARRHEA: 0
VOMITING: 0
COUGH: 0
EYE ITCHING: 1

## 2021-09-06 ASSESSMENT — PAIN DESCRIPTION - PROGRESSION: CLINICAL_PROGRESSION: GRADUALLY WORSENING

## 2021-09-06 ASSESSMENT — PAIN DESCRIPTION - FREQUENCY: FREQUENCY: CONTINUOUS

## 2021-09-06 ASSESSMENT — PAIN DESCRIPTION - PAIN TYPE: TYPE: ACUTE PAIN

## 2021-09-06 ASSESSMENT — PAIN DESCRIPTION - DESCRIPTORS: DESCRIPTORS: SHARP;ACHING

## 2021-09-06 ASSESSMENT — PAIN DESCRIPTION - ORIENTATION: ORIENTATION: RIGHT;UPPER

## 2021-09-06 ASSESSMENT — PAIN - FUNCTIONAL ASSESSMENT: PAIN_FUNCTIONAL_ASSESSMENT: PREVENTS OR INTERFERES SOME ACTIVE ACTIVITIES AND ADLS

## 2021-09-06 ASSESSMENT — PAIN DESCRIPTION - LOCATION: LOCATION: HEAD;EYE

## 2021-09-06 NOTE — ED NOTES
ED to inpatient nurses report    Chief Complaint   Patient presents with    Eye Pain      Present to ED from home  LOC: alert and orientated to name, place, date  Vital signs   Vitals:    09/06/21 1131 09/06/21 1250 09/06/21 1640 09/06/21 1731   BP:   108/87 101/88   Pulse: 63  81 91   Resp: 16  18 16   Temp: 97.6 °F (36.4 °C)      TempSrc: Oral      SpO2: 97%  95% 96%   Weight:  210 lb (95.3 kg)     Height:  5' 7\" (1.702 m)        Oxygen Baseline room air    Current needs required room air   LDAs:   Peripheral IV 09/06/21 Left Antecubital (Active)   Site Assessment Clean;Dry; Intact 09/06/21 1214   Line Status Blood return noted;Brisk blood return;Flushed;Normal saline locked 09/06/21 1214   Dressing Status Clean;Dry; Intact 09/06/21 1214   Dressing Intervention New 09/06/21 1214     Mobility: Independent  Pending ED orders: N/A  Present condition: Sitting at bedside. Pain improved after Morphine admin.  Right eye swollen with shingles       Electronically signed by Ethan Lanza RN on 9/6/2021 at 5:41 PM       Ethan Lanza RN  09/06/21 7215

## 2021-09-06 NOTE — ED NOTES
Return to [facility]     1/5/2017      RE:    [facility]za Torres   [address] [facility] [address]      This is to certify that Humza was seen in the clinic today and has a left shoulder strain.    Restrictions:   Able to play violin as tolerated   Patient to be excused from the [facility] if unable to play violin         Signature: __________________________________________________, 1/5/2017              Victor M Yañez MD  [facility]  [address]  [place] [address] [phone]     Pt transported to [facility] on cart in stable condition. Floor contacted before transport. Spoke with [employer].      Verónica Anders  09/06/21 6402

## 2021-09-06 NOTE — ED PROVIDER NOTES
[facility]  eMERGENCY dEPARTMENT eNCOUnter          CHIEF COMPLAINT       Chief Complaint   Patient presents with    Eye Pain       Nurses Notes reviewed and I agree except as noted inthe HPI. HISTORY OF PRESENT ILLNESS    Susan Teague is a [de-identified] y.o. male who presents to the Emergency Department for the evaluation of right eye drainage and headache. Patient states that he is having pain throughout his crown, worse on the right than the left as well as pain in the right eye. He states is been going on for about 2 days. He was evaluated in the emergency department yesterday for what was suspected to be a fall though the patient has no memory of falling and was not found down on the ground. This was assumed due to some right-sided facial pain and swelling. He is not on any anticoagulation. He states that the pain continues to be unimproved since his visit yesterday, though taking Motrin prior to arrival did provide some mild improvement. He reports photophobia associated, no ear pain. He was noted to have corneal abrasion yesterday and has been compliant with the prescribed gentamicin drops. He attempted to get in with local eye specialist but as it is a holiday, was referred to the emergency department. Patient has some issues with his memory. Additional information from wife via telephone is that there was a small amount of rash that began 3 days ago but significant worsening over the past 24 hours. No reported fall from the wife. Patient does note that he had some itching of the right eye prior to the rest of his symptoms developing. He had chickenpox as a child, has not received the shingles vaccine. Reports some difficulty with vision of the right eye, largely attributed to swelling. The HPI was provided by the patient. REVIEW OF SYSTEMS     Review of Systems   Constitutional: Negative for chills and fever. HENT: Negative for ear pain.     Eyes: Positive for photophobia, pain, discharge, redness, itching and visual disturbance. Respiratory: Negative for cough and shortness of breath. Cardiovascular: Negative for chest pain. Gastrointestinal: Negative for abdominal pain, diarrhea and vomiting. Skin: Positive for rash. Neurological: Positive for headaches. All other systems reviewed and are negative. PAST MEDICAL HISTORY    has a past medical history of Hypertension. SURGICAL HISTORY      has a past surgical history that includes Prostate surgery; Tonsillectomy; and Colonoscopy.     CURRENT MEDICATIONS       Current Discharge Medication List      CONTINUE these medications which have NOT CHANGED    Details   Cyanocobalamin (VITAMIN B 12 PO) Take by mouth daily      vitamin D3 (CHOLECALCIFEROL) 400 units TABS tablet Take 800 Units by mouth 2 times daily (before meals)       calcium carbonate (OSCAL) 500 MG TABS tablet Take 500 mg by mouth 2 times daily       baclofen (LIORESAL) 10 MG tablet Take 20 mg by mouth 3 times daily       Omega-3 Fatty Acids (FISH OIL) 1000 MG CAPS Take 3,000 mg by mouth 3 times daily       ascorbic acid (VITAMIN C) 1000 MG tablet Take 1,000 mg by mouth 4 times daily (before meals and nightly) For cholesterols      Lysine 500 MG TABS Take 1 tablet by mouth 4 times daily (before meals and nightly) For cholesterols      Proline 500 MG CAPS Take 1 capsule by mouth 4 times daily (before meals and nightly) For cholesterols      B Complex-Folic Acid (Z-028 BALANCED TR PO) Take 1 tablet by mouth 4 times daily (before meals and nightly) Natures Made B-100 time released      DHEA 10 MG TABS Take 50 mg by mouth daily       Cinnamon 500 MG CAPS Take 3 capsules by mouth 4 times daily Wolf with 2 ounces of half and half from [employer]      Levomefolic Acid (5-MTHF PO) Take 10 mg by mouth every morning (before breakfast) Metabolic Maintenance at [facility]      lisinopril (PRINIVIL;ZESTRIL) 20 MG tablet Take 1 tablet by mouth daily      tamsulosin (FLOMAX) 0.4 MG capsule Take 1 capsule by mouth daily      Magnesium 400 MG CAPS Take 1 capsule by mouth 4 times daily (before meals and nightly)       Pyridoxine HCl (VITAMIN B-6) 100 MG tablet Take 200 mg by mouth daily       NONFORMULARY Take 2 tablets by mouth daily LBS II      vitamin A 83678 UNITS capsule Take 10,000 Units by mouth daily      zinc gluconate 50 MG tablet Take 50 mg by mouth daily             ALLERGIES     has No Known Allergies. FAMILY HISTORY     He indicated that his mother is . He indicated that his father is . family history includes High Blood Pressure in his mother; No Known Problems in his father. SOCIAL HISTORY      reports that he quit smoking about 51 years ago. His smoking use included cigarettes. He has a 10.00 pack-year smoking history. He has never used smokeless tobacco. He reports that he does not drink alcohol and does not use drugs. PHYSICAL EXAM     INITIAL VITALS:  height is 5' 7\" (1.702 m) and weight is 210 lb (95.3 kg). His oral temperature is 98.9 °F (37.2 °C). His blood pressure is 97/64 and his pulse is 81. His respiration is 18 and oxygen saturation is 97%. Physical Exam  Vitals and nursing note reviewed. Constitutional:       Appearance: Normal appearance. HENT:      Head: Normocephalic. Right Ear: Tympanic membrane normal.      Ears:      Comments: No otologic rash  Eyes:      Comments: Right-sided facial edema with vesicular rash, tenderness noted to the right forehead, parietal scalp, cheek and periorbital area as well as 1 pinpoint vesicle to the right tip of the nose. Right conjunctive is injected with copious exudate. No hyphema. Photophobic. Cardiovascular:      Rate and Rhythm: Normal rate. Pulmonary:      Effort: Pulmonary effort is normal. No respiratory distress. Musculoskeletal:      Cervical back: Normal range of motion. Skin:     General: Skin is warm.    Neurological:      General: No focal deficit present. Mental Status: He is alert. DIFFERENTIAL DIAGNOSIS:   Differential diagnoses are discussed    DIAGNOSTIC RESULTS     EKG: All EKG's are interpreted by the Emergency Department Physician who either signs or Co-signsthis chart in the absence of a cardiologist.          RADIOLOGY: non-plain film images(s) such as CT, Ultrasound and MRI are read by the radiologist.    No orders to display       LABS:      Labs Reviewed   CBC WITH AUTO DIFFERENTIAL - Abnormal; Notable for the following components:       Result Value    MPV 8.8 (*)     Lymphocytes Absolute 0.8 (*)     All other components within normal limits   BASIC METABOLIC PANEL - Abnormal; Notable for the following components:    Sodium 132 (*)     Chloride 97 (*)     Glucose 109 (*)     All other components within normal limits   OSMOLALITY - Abnormal; Notable for the following components:    Osmolality Calc 265.2 (*)     All other components within normal limits   ANION GAP   GLOMERULAR FILTRATION RATE, ESTIMATED   COMPREHENSIVE METABOLIC PANEL W/ REFLEX TO MG FOR LOW K   CBC WITH AUTO DIFFERENTIAL       EMERGENCY DEPARTMENT COURSE:   Vitals:    Vitals:    09/06/21 1640 09/06/21 1731 09/06/21 1848 09/06/21 2048   BP: 108/87 101/88 108/70 97/64   Pulse: 81 91 89 81   Resp: 18 16 16 18   Temp:   97.6 °F (36.4 °C) 98.9 °F (37.2 °C)   TempSrc:   Oral Oral   SpO2: 95% 96% 96% 97%   Weight:       Height:          12:06 PM EDT: The patient was seen and evaluated. Patient presents for complaints of headache, primarily right-sided with associated edema and eye pain. On further discussion, he has acute onset right-sided facial rash as well. Complains of associated photophobia and copious drainage from the eye. Appears consistent with shingles. Patient has history of chickenpox as a child and has not received that shingles vaccine. Attending provider who saw the patient yesterday evaluated the patient as well and agrees.   Patient had fluorescein stain yesterday without any keratotic lesions and additional was deferred today. Discussed admission for IV antibiotics and the patient was agreeable with this plan. Pain improved after morphine. Case discussed with the hospitalist service will admit the patient for further care. CRITICAL CARE:   None    CONSULTS:  Hospitalist    PROCEDURES:  None    FINAL IMPRESSION      1. Herpes zoster ophthalmicus          DISPOSITION/PLAN   Admit    PATIENT REFERRED TO:  No follow-up provider specified.     DISCHARGEMEDICATIONS:  Current Discharge Medication List          (Please note that portions of this note were completedwith a voice recognition program.  Efforts were made to edit the dictations but occasionally words are mis-transcribed.)        Nancy Valdes PA-C  09/06/21 2030

## 2021-09-06 NOTE — ACP (ADVANCE CARE PLANNING)
Advance Care Planning     Advance Care Planning Activator (Inpatient)  Conversation Note      Date of ACP Conversation: 9/6/2021     Conversation Conducted with: Patient with Decision Making Capacity    ACP Activator: Idris Hopkins RN, BSN, Michelle Yang 83 Decision Maker:   Oly Salinas Mikey becerril, [phone]    Current Designated Health Care Decision Maker: Melissa Giraldo, [phone]    Patient declines consult for AD    Care Preferences    Ventilation: \"If you were in your present state of health and suddenly became very ill and were unable to breathe on your own, what would your preference be about the use of a ventilator (breathing machine) if it were available to you? \"      Would the patient desire the use of ventilator (breathing machine)?: yes    \"If your health worsens and it becomes clear that your chance of recovery is unlikely, what would your preference be about the use of a ventilator (breathing machine) if it were available to you? \"     Would the patient desire the use of ventilator (breathing machine)?: No      Resuscitation  \"CPR works best to restart the heart when there is a sudden event, like a heart attack, in someone who is otherwise healthy. Unfortunately, CPR does not typically restart the heart for people who have serious health conditions or who are very sick. \"    \"In the event your heart stopped as a result of an underlying serious health condition, would you want attempts to be made to restart your heart (answer \"yes\" for attempt to resuscitate) or would you prefer a natural death (answer \"no\" for do not attempt to resuscitate)? \" yes       [] Yes   [] No   Educated Patient / Ila Magana regarding differences between Advance Directives and portable DNR orders.     Length of ACP Conversation in minutes:  16    Conversation Outcomes:  [x] ACP discussion completed  [x] Existing advance directive reviewed with patient; no changes to patient's previously recorded wishes  [] New Advance Directive completed  [] Portable Do Not Rescitate prepared for Provider review and signature  [] POLST/POST/MOLST/MOST prepared for Provider review and signature      Follow-up plan:    [] Schedule follow-up conversation to continue planning  [] Referred individual to Provider for additional questions/concerns   [] Advised patient/agent/surrogate to review completed ACP document and update if needed with changes in condition, patient preferences or care setting    [x] This note routed to one or more involved healthcare providers     Patient in ED this date. Wife not present, daughter left to go be with her. Patient states he wants full code and ok with short term use of vent. He would not want a vent used if he was not expected to survive. He declined offer to complete HCPOA and states his wife can make decisions.

## 2021-09-06 NOTE — H&P
History & Physical        Patient:  Song Youngblood  YOB: 1940    MRN: [id number]     Acct: [de-identified]    PCP: Aram Craft    Date of Admission: 9/6/2021    Date of Service: Pt seen/examined on 09/06/21  and Admitted to Inpatient with expected LOS greater than two midnights due to medical therapy. Chief Complaint:  Eye swelling    Assessment / Plan:    1. Right Eye Swelling concerning for Herpes Zoster Opthalmicus  - Patient will be started on IV acyclovir. - optho consulted for tomorrow am.   - Continue to monitor swelling, drainage. 2. HTN  - lisinopril   - stable    3. BPH  - flomax    History Of Present Illness:      [de-identified] y.o. male who presented to [facility] with c/o right eye swelling, crusting, pain, copious drainage. He was seen in the ER on 9/5 with a fall with similar complaints. He was evaluated and at that time it was felt that he had bacterial conjunctivitis and facial injury. He had purulent dc and a corneal abrasion per documentation and he was sent home on gentamicin drops. He was to follow up with dr. Noe sheets but returned to the ER, due to worsening pain and swelling and eye watering. In the ER, patient was felt to have herpes zoster opthalmicus and plan was made to admit to hospital for IV antiviral with optho consult in the morning. He was symptomatically treated with morphine and zofran. He did get a 500cc bolus in the ER. Past Medical History:          Diagnosis Date    Hypertension        Past Surgical History:          Procedure Laterality Date    COLONOSCOPY      PROSTATE SURGERY      TONSILLECTOMY         Medications Prior to Admission:      Prior to Admission medications    Medication Sig Start Date End Date Taking?  Authorizing Provider   Cyanocobalamin (VITAMIN B 12 PO) Take by mouth daily    Historical Provider, MD   vitamin D3 (CHOLECALCIFEROL) 400 units TABS tablet Take 800 Units by mouth 2 times daily (before meals) Historical Provider, MD   calcium carbonate (OSCAL) 500 MG TABS tablet Take 500 mg by mouth 2 times daily     Historical Provider, MD   baclofen (LIORESAL) 10 MG tablet Take 20 mg by mouth 3 times daily  5/15/18   Historical Provider, MD   Omega-3 Fatty Acids (FISH OIL) 1000 MG CAPS Take 3,000 mg by mouth 3 times daily     Historical Provider, MD   ascorbic acid (VITAMIN C) 1000 MG tablet Take 1,000 mg by mouth 4 times daily (before meals and nightly) For cholesterols    Historical Provider, MD   Lysine 500 MG TABS Take 1 tablet by mouth 4 times daily (before meals and nightly) For cholesterols    Historical Provider, MD   Proline 500 MG CAPS Take 1 capsule by mouth 4 times daily (before meals and nightly) For cholesterols    Historical Provider, MD   B Complex-Folic Acid (Z-001 BALANCED TR PO) Take 1 tablet by mouth 4 times daily (before meals and nightly) Natures Made B-100 time released    Historical Provider, MD   DHEA 10 MG TABS Take 50 mg by mouth daily     Historical Provider, MD   Cinnamon 500 MG CAPS Take 3 capsules by mouth 4 times daily Wolf with 2 ounces of half and half from [facility]    Historical Provider, MD   Levomefolic Acid (5-MTHF PO) Take 10 mg by mouth every morning (before breakfast) Metabolic Maintenance at [address] [place] [address] Avenue Ne Provider, MD   lisinopril (PRINIVIL;ZESTRIL) 20 MG tablet Take 1 tablet by mouth daily 2/14/17   Historical Provider, MD   tamsulosin (FLOMAX) 0.4 MG capsule Take 1 capsule by mouth daily 12/6/16   Historical Provider, MD   Magnesium 400 MG CAPS Take 1 capsule by mouth 4 times daily (before meals and nightly)     Historical Provider, MD   Pyridoxine HCl (VITAMIN B-6) 100 MG tablet Take 200 mg by mouth daily     Historical Provider, MD   NONFORMULARY Take 2 tablets by mouth daily LBS II    Historical Provider, MD   vitamin A 85488 UNITS capsule Take 10,000 Units by mouth daily    Historical Provider, MD   zinc gluconate 50 MG tablet Take 50 mg by mouth daily Historical Provider, MD       Allergies:  Patient has no known allergies. Social History:      The patient currently lives home    TOBACCO:   reports that he quit smoking about 51 years ago. His smoking use included cigarettes. He has a 10.00 pack-year smoking history. He has never used smokeless tobacco.  ETOH:   reports no history of alcohol use. Family History:       Reviewed in detail and negative for DM, CAD, Cancer, CVA. Positive as follows:        Problem Relation Age of Onset    High Blood Pressure Mother     No Known Problems Father        Diet:  Regular    REVIEW OF SYSTEMS:   Pertinent positives as noted in the HPI. All other systems reviewed and negative. PHYSICAL EXAM:    Pulse 63   Temp 97.6 °F (36.4 °C) (Oral)   Resp 16   SpO2 97%     General appearance:  +ve distress, appears stated age and cooperative. HEENT:  Right eye swelling, lacrimation, crusting, vesicles in place,    Neck: Supple, with full range of motion. No jugular venous distention. Trachea midline. Respiratory:  Normal respiratory effort. Clear  Cardiovascular:  Regular rate and rhythm with normal S1/S2  Abdomen: Soft, non-tender, non-distended with normal bowel sounds. Musculoskeletal:  No clubbing, cyanosis or edema bilaterally. Full range of motion without deformity. Skin: Skin color, texture, turgor normal.  No rashes or lesions. Neurologic: grossly non-focal.  Psychiatric:  Alert and oriented, thought content appropriate, normal insight  Capillary Refill: Brisk,< 3 seconds   Peripheral Pulses: +2 palpable, equal bilaterally     Labs:     Recent Labs     09/05/21  1035   WBC 6.7   HGB 15.6   HCT 46.4        Recent Labs     09/05/21  1035      K 3.9      CO2 23   BUN 14   CREATININE 0.9   CALCIUM 9.2     Recent Labs     09/05/21  1035   AST 18   ALT 12   BILITOT 0.8   ALKPHOS 96     No results for input(s): INR in the last 72 hours.   Recent Labs     09/05/21  1035   CKTOTAL 39*       Urinalysis: No results found for: NITRU, WBCUA, BACTERIA, RBCUA, BLOODU, SPECGRAV, GLUCOSEU    Intake & Output:  No intake/output data recorded. No intake/output data recorded. Radiology:     CXR: I have reviewed the CXR with the following interpretation:   EKG:  I have reviewed the EKG with the following interpretation:     No orders to display        DVT prophylaxis: scd    Code Status: Full    PT/OT Eval Status:     [facility]    Diagnosis Date Noted    Herpes zoster ophthalmicus [B02.30] 09/06/2021       Thank you Gomez Pedro for the opportunity to be involved in this patient's care.     Electronically signed by Yolanda Coello MD on 9/6/2021 at 12:35 PM

## 2021-09-07 ENCOUNTER — APPOINTMENT (OUTPATIENT)
Dept: CT IMAGING | Age: 81
DRG: 124 | End: 2021-09-07
Payer: MEDICARE

## 2021-09-07 LAB
ALBUMIN SERPL-MCNC: 3.7 G/DL (ref 3.5–5.1)
ALLEN TEST: POSITIVE
ALP BLD-CCNC: 79 U/L (ref 38–126)
ALT SERPL-CCNC: 10 U/L (ref 11–66)
ANION GAP SERPL CALCULATED.3IONS-SCNC: 12 MEQ/L (ref 8–16)
AST SERPL-CCNC: 17 U/L (ref 5–40)
BASE EXCESS (CALCULATED): -2 MMOL/L (ref -2.5–2.5)
BASOPHILS # BLD: 0.3 %
BASOPHILS ABSOLUTE: 0 THOU/MM3 (ref 0–0.1)
BILIRUB SERPL-MCNC: 0.8 MG/DL (ref 0.3–1.2)
BUN BLDV-MCNC: 23 MG/DL (ref 7–22)
CALCIUM SERPL-MCNC: 9 MG/DL (ref 8.5–10.5)
CHLORIDE BLD-SCNC: 99 MEQ/L (ref 98–111)
CO2: 25 MEQ/L (ref 23–33)
COLLECTED BY:: ABNORMAL
CREAT SERPL-MCNC: 2.3 MG/DL (ref 0.4–1.2)
DEVICE: ABNORMAL
EOSINOPHIL # BLD: 1 %
EOSINOPHILS ABSOLUTE: 0.1 THOU/MM3 (ref 0–0.4)
ERYTHROCYTE [DISTWIDTH] IN BLOOD BY AUTOMATED COUNT: 14 % (ref 11.5–14.5)
ERYTHROCYTE [DISTWIDTH] IN BLOOD BY AUTOMATED COUNT: 46.3 FL (ref 35–45)
GFR SERPL CREATININE-BSD FRML MDRD: 27 ML/MIN/1.73M2
GLUCOSE BLD-MCNC: 110 MG/DL (ref 70–108)
GLUCOSE BLD-MCNC: 96 MG/DL (ref 70–108)
HCO3: 22 MMOL/L (ref 23–28)
HCT VFR BLD CALC: 43.7 % (ref 42–52)
HEMOGLOBIN: 14 GM/DL (ref 14–18)
IMMATURE GRANS (ABS): 0.05 THOU/MM3 (ref 0–0.07)
IMMATURE GRANULOCYTES: 0.6 %
LACTIC ACID: 0.9 MMOL/L (ref 0.5–2)
LYMPHOCYTES # BLD: 10.1 %
LYMPHOCYTES ABSOLUTE: 0.8 THOU/MM3 (ref 1–4.8)
MCH RBC QN AUTO: 28.9 PG (ref 26–33)
MCHC RBC AUTO-ENTMCNC: 32 GM/DL (ref 32.2–35.5)
MCV RBC AUTO: 90.3 FL (ref 80–94)
MONOCYTES # BLD: 11 %
MONOCYTES ABSOLUTE: 0.9 THOU/MM3 (ref 0.4–1.3)
NUCLEATED RED BLOOD CELLS: 0 /100 WBC
O2 SATURATION: 96 %
PCO2: 33 MMHG (ref 35–45)
PH BLOOD GAS: 7.43 (ref 7.35–7.45)
PLATELET # BLD: 193 THOU/MM3 (ref 130–400)
PMV BLD AUTO: 9.3 FL (ref 9.4–12.4)
PO2: 79 MMHG (ref 71–104)
POTASSIUM REFLEX MAGNESIUM: 4.2 MEQ/L (ref 3.5–5.2)
PROCALCITONIN: 0.8 NG/ML (ref 0.01–0.09)
RBC # BLD: 4.84 MILL/MM3 (ref 4.7–6.1)
SEG NEUTROPHILS: 77 %
SEGMENTED NEUTROPHILS ABSOLUTE COUNT: 6 THOU/MM3 (ref 1.8–7.7)
SODIUM BLD-SCNC: 136 MEQ/L (ref 135–145)
SOURCE, BLOOD GAS: ABNORMAL
TOTAL PROTEIN: 6.2 G/DL (ref 6.1–8)
WBC # BLD: 7.8 THOU/MM3 (ref 4.8–10.8)

## 2021-09-07 PROCEDURE — 6370000000 HC RX 637 (ALT 250 FOR IP): Performed by: OPHTHALMOLOGY

## 2021-09-07 PROCEDURE — 2580000003 HC RX 258: Performed by: PHYSICIAN ASSISTANT

## 2021-09-07 PROCEDURE — 99233 SBSQ HOSP IP/OBS HIGH 50: CPT | Performed by: INTERNAL MEDICINE

## 2021-09-07 PROCEDURE — 2060000000 HC ICU INTERMEDIATE R&B

## 2021-09-07 PROCEDURE — 36600 WITHDRAWAL OF ARTERIAL BLOOD: CPT

## 2021-09-07 PROCEDURE — 6360000002 HC RX W HCPCS: Performed by: INTERNAL MEDICINE

## 2021-09-07 PROCEDURE — 83605 ASSAY OF LACTIC ACID: CPT

## 2021-09-07 PROCEDURE — 51702 INSERT TEMP BLADDER CATH: CPT

## 2021-09-07 PROCEDURE — 2580000003 HC RX 258: Performed by: FAMILY MEDICINE

## 2021-09-07 PROCEDURE — 2580000003 HC RX 258: Performed by: INTERNAL MEDICINE

## 2021-09-07 PROCEDURE — 84145 PROCALCITONIN (PCT): CPT

## 2021-09-07 PROCEDURE — 36415 COLL VENOUS BLD VENIPUNCTURE: CPT

## 2021-09-07 PROCEDURE — 87040 BLOOD CULTURE FOR BACTERIA: CPT

## 2021-09-07 PROCEDURE — 82948 REAGENT STRIP/BLOOD GLUCOSE: CPT

## 2021-09-07 PROCEDURE — 6360000002 HC RX W HCPCS: Performed by: FAMILY MEDICINE

## 2021-09-07 PROCEDURE — 70450 CT HEAD/BRAIN W/O DYE: CPT

## 2021-09-07 PROCEDURE — 80053 COMPREHEN METABOLIC PANEL: CPT

## 2021-09-07 PROCEDURE — 6370000000 HC RX 637 (ALT 250 FOR IP): Performed by: FAMILY MEDICINE

## 2021-09-07 PROCEDURE — 85025 COMPLETE CBC W/AUTO DIFF WBC: CPT

## 2021-09-07 PROCEDURE — 82803 BLOOD GASES ANY COMBINATION: CPT

## 2021-09-07 RX ORDER — PREDNISOLONE ACETATE 10 MG/ML
1 SUSPENSION/ DROPS OPHTHALMIC EVERY 6 HOURS SCHEDULED
Status: DISCONTINUED | OUTPATIENT
Start: 2021-09-07 | End: 2021-09-20 | Stop reason: HOSPADM

## 2021-09-07 RX ORDER — 0.9 % SODIUM CHLORIDE 0.9 %
500 INTRAVENOUS SOLUTION INTRAVENOUS ONCE
Status: COMPLETED | OUTPATIENT
Start: 2021-09-07 | End: 2021-09-07

## 2021-09-07 RX ORDER — QUETIAPINE FUMARATE 25 MG/1
25 TABLET, FILM COATED ORAL 2 TIMES DAILY
Status: DISCONTINUED | OUTPATIENT
Start: 2021-09-07 | End: 2021-09-20 | Stop reason: HOSPADM

## 2021-09-07 RX ADMIN — Medication 400 MG: at 11:29

## 2021-09-07 RX ADMIN — CALCIUM 500 MG: 500 TABLET ORAL at 10:36

## 2021-09-07 RX ADMIN — Medication 50 MG: at 10:33

## 2021-09-07 RX ADMIN — TAMSULOSIN HYDROCHLORIDE 0.4 MG: 0.4 CAPSULE ORAL at 10:36

## 2021-09-07 RX ADMIN — Medication 400 MG: at 17:17

## 2021-09-07 RX ADMIN — ACYCLOVIR SODIUM 950 MG: 50 INJECTION, SOLUTION INTRAVENOUS at 21:10

## 2021-09-07 RX ADMIN — PREDNISOLONE ACETATE 1 DROP: 10 SUSPENSION/ DROPS OPHTHALMIC at 18:36

## 2021-09-07 RX ADMIN — Medication 400 MG: at 05:35

## 2021-09-07 RX ADMIN — Medication 1000 UNITS: at 05:34

## 2021-09-07 RX ADMIN — ACYCLOVIR SODIUM 950 MG: 50 INJECTION, SOLUTION INTRAVENOUS at 05:26

## 2021-09-07 RX ADMIN — SODIUM CHLORIDE 500 ML: 9 INJECTION, SOLUTION INTRAVENOUS at 04:23

## 2021-09-07 RX ADMIN — BACLOFEN 20 MG: 10 TABLET ORAL at 17:18

## 2021-09-07 RX ADMIN — Medication 400 MG: at 21:13

## 2021-09-07 RX ADMIN — BACLOFEN 20 MG: 10 TABLET ORAL at 21:14

## 2021-09-07 RX ADMIN — Medication 200 MG: at 10:34

## 2021-09-07 RX ADMIN — Medication 1000 UNITS: at 17:18

## 2021-09-07 RX ADMIN — BACLOFEN 20 MG: 10 TABLET ORAL at 10:35

## 2021-09-07 ASSESSMENT — PAIN SCALES - GENERAL
PAINLEVEL_OUTOF10: 0

## 2021-09-07 NOTE — PROGRESS NOTES
Hospitalist Progress Note    Patient:  Sarbjit Gaona      Unit/Bed:4A-06/006-A    YOB: 1940    MRN: [id number]       Acct: [de-identified]     PCP: Garrett Siegel    Date of Admission: 9/6/2021    Assessment/Plan:    1. Right eye swelling - possibly herpes zoster ophthalmicus        - Periorbital shingle-like lesions, with drainage and swelling of right eye. Started on IV acyclovir 950 mg IV q12hrs. ID following. 9/7 transported to outpatient ophthalmology center for evaluation. Ophthalmology recommended prednisolone eye drops q6hrs until next appointment 9/20.    2. Acute kidney injury - Stage III, likely secondary to hypotension.        - No history of kidney injury. Likely secondary to hypotension, but acyclovir may be contributory. - 9/7 Elevated Cr 2.3 from previous 9/6 Cr 0.7. Started  mL/hr. we will continue to monitor renal function with daily BMP. 3. Hypertension       - Takes home lisinopril 20 mg po qDay. - Lisinopril held secondary to DEANDRE. 4. BPH       - Currently on Flomax 0.4 mg po qDay      Expected discharge date:  TBD    Disposition:    [x] Home       [] TCU       [] Rehab       [] Psych       [] SNF       [] [place]       [] Other-    Chief Complaint: Right eye pain and swelling    Hospital Course: The patient is a 46-year-old male with a past medical history of hypertension and BPH who presented to [facility] ED on 9/6 for complaints of right eye swelling and pain. Patient states this has been going on for 2 to 3 days. He tried Motrin and applied a wet damp cloth on the eye but found mild relief only. He did report some associated photophobia, and presentation of a small rash on the right orbital forehead. The rash continued to worsen, and was associated with itching. The patient initially came to the ED on 9/5 and was found to have corneal abrasions with bacterial conjunctivitis.   He was given gentamicin ophthalmic drops, told to follow-up outpatient with Dr. De La Vega Failing but return to the ED if symptoms worsened. 9/6 the patient came back to the ED complaining of worsening pain, swelling and unable to open his eye. He was started on IV acyclovir for concerns of herpes zoster's ophthalmicus. Patient was admitted for further evaluation and management. Subjective (past 24 hours): The patient was noted to be hypotensive with SBPs in 90s with one event of BP 75/56. This morning the patient is hemodynamically stable. He was started on fluid resuscitation. He states he is doing well this morning. He denies any new complaints. There seems to be improvement in his right eye compared to initial presentation. There is no associated photophobia, pain or itchiness. ID following. He was transported out to be seen by ophthalmology center. ROS (12 point review of systems completed. Pertinent positives noted. Otherwise ROS is negative). Medications:  Reviewed    Infusion Medications    sodium chloride      sodium chloride 75 mL/hr at 09/06/21 1927     Scheduled Medications    acyclovir  10 mg/kg IntraVENous Q12H    prednisoLONE acetate  1 drop Right Eye 4 times per day    baclofen  20 mg Oral TID    calcium elemental  500 mg Oral BID    [Held by provider] lisinopril  20 mg Oral Daily    magnesium oxide  400 mg Oral 4x Daily AC & HS    vitamin B-6  200 mg Oral Daily    tamsulosin  0.4 mg Oral Daily    Vitamin D  1,000 Units Oral BID AC    zinc sulfate  50 mg Oral Daily    sodium chloride flush  5-40 mL IntraVENous 2 times per day     PRN Meds: sodium chloride flush, sodium chloride, ondansetron **OR** ondansetron, polyethylene glycol, acetaminophen **OR** acetaminophen, gabapentin, morphine      Intake/Output Summary (Last 24 hours) at 9/7/2021 1553  Last data filed at 9/7/2021 0422  Gross per 24 hour   Intake 1154.91 ml   Output 300 ml   Net 854.91 ml       Diet:  ADULT DIET;  Regular; Low Fat/Low Chol/High Fiber/2 gm Na    Exam:  /79   Pulse 67   Temp 97.1 °F (36.2 °C) (Oral)   Resp 16   Ht 5' 7\" (1.702 m)   Wt 210 lb (95.3 kg)   SpO2 91%   BMI 32.89 kg/m²     General appearance: No apparent distress, appears stated age and cooperative. HEENT: Right eye sclera injected with crusting and drainage. Periorbital vesicular rash. Neck: Supple, with full range of motion. No jugular venous distention. Trachea midline. Respiratory:  Normal respiratory effort. Clear to auscultation, bilaterally without Rales/Wheezes/Rhonchi. Cardiovascular: Regular rate and rhythm with normal S1/S2 without murmurs, rubs or gallops. Abdomen: Soft, non-tender, non-distended with normal bowel sounds. Musculoskeletal: passive and active ROM x 4 extremities. Skin: Skin color, texture, turgor normal.  Right sided periorbital vesicular rash along V1 distribution. Neurologic:  Neurovascularly intact without any focal sensory/motor deficits. Cranial nerves: II-XII intact, grossly non-focal.  Psychiatric: Alert and oriented, thought content appropriate, normal insight  Capillary Refill: Brisk,< 3 seconds   Peripheral Pulses: +2 palpable, equal bilaterally       Labs:   Recent Labs     09/05/21  1035 09/06/21  1219 09/07/21  0356   WBC 6.7 6.7 7.8   HGB 15.6 15.3 14.0   HCT 46.4 45.9 43.7    192 193     Recent Labs     09/05/21  1035 09/06/21  1219 09/07/21  0356    132* 136   K 3.9 3.9 4.2    97* 99   CO2 23 23 25   BUN 14 13 23*   CREATININE 0.9 0.7 2.3*   CALCIUM 9.2 9.1 9.0     Recent Labs     09/05/21  1035 09/07/21  0356   AST 18 17   ALT 12 10*   BILITOT 0.8 0.8   ALKPHOS 96 79     No results for input(s): INR in the last 72 hours.   Recent Labs     09/05/21  1035   [facility]       Microbiology:    None     Urinalysis:    No results found for: Melissa Reynolds, BACTERIA, RBCUA, BLOODU, SPECGRAV, GLUCOSEU    Radiology:  No orders to display       DVT prophylaxis: [] Lovenox                                 [] SCDs

## 2021-09-07 NOTE — CARE COORDINATION
DISCHARGE/PLANNING EVALUATION  9/7/21, 11:15 AM EDT    Reason for Referral: Current with [facility]  Mental Status: Patient is alert and oriented  Decision Making: Patient makes own decisions  Family/Social/Home Environment: Spoke with patient, assessment completed. Patient lives with wife in a 2 story home, only use main floor. He has a walk in shower with a seat. She states he is independent with ADL's. He is current with [facility] only. Current Services including food security, transportation and housekeeping: Current wit [facility], needs met  Current Equipment: walker and electric wheelchair  Payment Source:[employer] Medicare  Concerns or Barriers to Discharge: Patient is agreeable to adding [place] nurse if needed. Post acute provider list with quality measures, geographic area and applicable managed care information provided. Questions regarding selection process answered: no, current with [facility]    Teach Back Method used with patient regarding care plan and discharge planning. Patient  verbalize understanding of the plan of care and contribute to goal setting. Patient goals, treatment preferences and discharge plan: Patient plans home with spouse and resume [facility] OT and possibly adding nursing services. Electronically signed by JAYDEN Moreira on 9/7/2021 at 11:15 AM     11:28 AM  [address] [facility] spoke with daryl Penaloza PT/OT services.

## 2021-09-07 NOTE — CONSULTS
CONSULTATION NOTE :ID       Patient - Gilford Bastos,  Age - [de-identified] y.o.    - 1940      Room Number - 4A-06/006-A   N -  [id number]   Acct # - [de-identified]  Date of Admission -  2021 11:26 AM  Patient's PCP: Orson Dakin     Requesting Physician: Shira Crump MD    REASON FOR CONSULTATION   Herpes-Zoster Ophthalmicus  CHIEF COMPLAINT   Right eye drainage and pain    HISTORY OF PRESENT ILLNESS     Gilford Bastos is an [de-identified] y.o. male with PMHX of HTN who was admitted to [facility] with complaint of right eye drainage and headache for the past 2 days. He attempted taking Motrin with only mild improvement in pain. He does report associated symptoms of photophobia and a small rash that began 3 days ago and worsened over thet time period. Of note, he was seen in the ED 1 day prior (21) and prescribed gentamycin ophthalmic drops for a diagnosed corneal abrasion. He was unable to obtain an appointment with an eye specialist after ED visit due to the  [place] holiday so he reported back to [facility] ED. The patient denies ever receiving varicella vaccine. He denies fever, chills, sinus pressure, cough, chest pain, shortness of breath, abdominal pain, nausea, vomiting, change in bowel/bladder habits, or weakness. He admits to photophobia, right eye pain, right eye drainage, and headache (primarily right sided).     PAST MEDICAL  HISTORY       Past Medical History:   Diagnosis Date    Hypertension        PAST SURGICAL HISTORY     Past Surgical History:   Procedure Laterality Date    COLONOSCOPY      PROSTATE SURGERY      TONSILLECTOMY           MEDICATIONS:       Scheduled Meds:   acyclovir  10 mg/kg IntraVENous Q8H    baclofen  20 mg Oral TID    calcium elemental  500 mg Oral BID    [Held by provider] lisinopril  20 mg Oral Daily    magnesium oxide  400 mg Oral 4x Daily AC & HS    vitamin B-6  200 mg Oral Daily    tamsulosin  0.4 mg Oral Daily    Vitamin D  1,000 Units Oral BID AC    zinc sulfate  50 mg Oral Daily    sodium chloride flush  5-40 mL IntraVENous 2 times per day     Continuous Infusions:   sodium chloride      sodium chloride 75 mL/hr at 09/06/21 1927     PRN Meds:sodium chloride flush, sodium chloride, ondansetron **OR** ondansetron, polyethylene glycol, acetaminophen **OR** acetaminophen, gabapentin, morphine  Allergies:   ALLERGIES:    Patient has no known allergies. SOCIAL HISTORY:     TOBACCO:   reports that he quit smoking about 51 years ago. His smoking use included cigarettes. He has a 10.00 pack-year smoking history. He has never used smokeless tobacco.     ETOH:   reports no history of alcohol use. Patient currently lives with family (Wife)      FAMILY HISTORY:         Problem Relation Age of Onset    High Blood Pressure Mother     No Known Problems Father        REVIEW OF SYSTEMS:     Constitutional: no fever, no night sweats, no fatigue, no weight loss. Head: (+) right-sided head pain, no head injury, no migranes. Eye: (+) right eye discharge, (+) right-sided blurring of vision, no double vision, (+) right eye pain. Ears: no hearing difficulty, no tinnitus  Mouth/throat: no ulceration, dental caries , dysphagia, no hoarseness and voice change  Respiratory: no cough no chest pain,no shortness of breath,no wheezing  CVS: no palpitation, no chest pain,   GI: no abdominal pain, no nausea , no vomiting, no constipation,no diarrhea. LUCIAN: no dysuria, frequency and urgency, no hematuria, no kidney stones  Musculoskeletal: no joint pain, swelling , stiffness,  Endocrine: no polyuria, polydipsia, no cold or heat intolerance  Hematology: no anemia, no easy brusing or bleeding, no hx of clotting disorder  Dermatology: no skin rash, no skin lesions, no pruritis,  Neurological:no headaches,no dizziness, no seizure, no numbness.   Psychiatry: no depression, no anxiety,no panic attacks, no suicide ideation    PHYSICAL EXAM:     /78   Pulse 75   Temp 97.9 °F (36.6 °C) (Oral)   Resp 18   Ht 5' 7\" (1.702 m)   Wt 210 lb (95.3 kg)   SpO2 91%   BMI 32.89 kg/m²   General apperance:  Awake, alert, not in distress. HEENT: Right eye sclera injected with crusting and drainage. Vesicular rash of the right over the V1 distribution. Binocular visual fields intact with EOMI and PERRLA. Moist oral mucosa. Pulmonary:  Clear to auscultation in all fields without wheeze or rales  Cardiovascular:  RRR ,S1S2, no murmur or gallop. Abdomen:  Soft, non tender to palpation. Extremities: No focal deficits. Passive and active ROM intact  Skin:  Warm and dry. Vesicular eruptions in multiple states of healing along the right V1 distribution  CNS: No focal deficits. LABS:     CBC:   Recent Labs     09/05/21  1035 09/06/21  1219 09/07/21  0356   WBC 6.7 6.7 7.8   HGB 15.6 15.3 14.0    192 193     BMP:    Recent Labs     09/05/21  1035 09/06/21  1219 09/07/21  0356    132* 136   K 3.9 3.9 4.2    97* 99   CO2 23 23 25   BUN 14 13 23*   CREATININE 0.9 0.7 2.3*   GLUCOSE 93 109* 110*     Calcium:  Recent Labs     09/07/21  0356   CALCIUM 9.0    Hepatic:   Recent Labs     09/07/21  0356   ALKPHOS 79   ALT 10*   AST 17   PROT 6.2   BILITOT 0.8   LABALBU 3.7    Troponin:   Recent Labs     09/05/21  1035   CKTOTAL 39*          Micro: No results found for: BC    Problem list of patient      Patient Active Problem List   Diagnosis Code    Nocturia R35.1    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 No hx of VZV vaccine. - Continue Acyclovir IV; will likely need Valacyclovir outpatient on discharge  - Ophthalmology consulted;he will see ophthalmologist this afternoon.   -Treatment plan discussed with patient    Infection Control:   Contact Isolation  Discharge Planning (Coordination of out patient care:   Home when stable  Thank you Idania Almendarez MD for allowing me to participate in this patient's care. Patrick Beltran DO, 9/7/2021 9:50 AM       German Bowman MD

## 2021-09-07 NOTE — CARE COORDINATION
9/7/21, 9:47 AM EDT  DISCHARGE PLANNING EVALUATION:    Ofelia Diamond       Admitted: 9/6/2021/ Ed[place] 688 day: 1   Location: [facility]06/006- Reason for admit: Herpes zoster ophthalmicus [B02.30]   PMH:  has a past medical history of Hypertension. Procedure: none  Barriers to Discharge:  From ED, creatinine 2.3, neuro checks, Ophthalmology consult, neuro checks, PT/OT, ID consult. IV Acyclovir. PCP: Anabell Freed  Readmission Risk Score: 11%    Patient Goals/Plan/Treatment Preferences: Met with Prema Christensen. He currently lives at home with his wife. He uses a walker. He is current with [facility] for therapies. Plan is to return home at discharge and resume HH. SW consult in place. Will follow. Transportation/Food Security/Housekeeping Addressed:  No issues identified.

## 2021-09-07 NOTE — PROGRESS NOTES
Patient is resting comfortably in bed call light in reach as well as possessions and pathway clear    [facility] SURGICAL [place] [facility]

## 2021-09-07 NOTE — PROGRESS NOTES
Alert and orientated x4. Oral mucous membranes are moist. Left eye is round and reactive to light. Right eye currently exhibiting signs of shingles dx and cannot assess pupils. Upper extremities pink, dry, and warm to the touch. Capillary refill is less than 3 seconds. Skin turgor is less than 3 seconds. Upper extremities ROM is present with no numbness or tingling. Hand grasp is strong and equal bilaterally. Lower extremities are warm, pink, and dry to the touch. No numbness or tingling present. No edema present. Pedal pulses are strong and equal.  Respirations are even and unlabored. Lung sounds are regualr in all four quadrants. Regular breathing pattern noted. Heart tones are regular. Abdomen is flat with no masses present. Bowel sounds are active in all four quadrants. IV site is intact and dry. Patient is able to move in bed with no difficulty. No pain or other concerns voiced. Call light within reach.

## 2021-09-07 NOTE — PROGRESS NOTES
Appointment set up with Dr. Ceferino Hall at 2:15PM at office. LACP notified of transport needs. Patient's wife and daughter [facility] aware, [facility] to accompany patient to appointment.

## 2021-09-08 ENCOUNTER — APPOINTMENT (OUTPATIENT)
Dept: GENERAL RADIOLOGY | Age: 81
DRG: 124 | End: 2021-09-08
Payer: MEDICARE

## 2021-09-08 ENCOUNTER — APPOINTMENT (OUTPATIENT)
Dept: MRI IMAGING | Age: 81
DRG: 124 | End: 2021-09-08
Payer: MEDICARE

## 2021-09-08 ENCOUNTER — APPOINTMENT (OUTPATIENT)
Dept: ULTRASOUND IMAGING | Age: 81
DRG: 124 | End: 2021-09-08
Payer: MEDICARE

## 2021-09-08 LAB
AMMONIA: 33 UMOL/L (ref 11–60)
ANION GAP SERPL CALCULATED.3IONS-SCNC: 12 MEQ/L (ref 8–16)
ANION GAP SERPL CALCULATED.3IONS-SCNC: 15 MEQ/L (ref 8–16)
BACTERIA: ABNORMAL
BASOPHILS # BLD: 0.5 %
BASOPHILS ABSOLUTE: 0 THOU/MM3 (ref 0–0.1)
BILIRUBIN URINE: NEGATIVE
BLOOD, URINE: ABNORMAL
BUN BLDV-MCNC: 40 MG/DL (ref 7–22)
BUN BLDV-MCNC: 42 MG/DL (ref 7–22)
CALCIUM SERPL-MCNC: 8.3 MG/DL (ref 8.5–10.5)
CALCIUM SERPL-MCNC: 8.4 MG/DL (ref 8.5–10.5)
CASTS: ABNORMAL /LPF
CASTS: ABNORMAL /LPF
CHARACTER, CSF: CLEAR
CHARACTER, URINE: ABNORMAL
CHLORIDE BLD-SCNC: 102 MEQ/L (ref 98–111)
CHLORIDE BLD-SCNC: 105 MEQ/L (ref 98–111)
CO2: 19 MEQ/L (ref 23–33)
CO2: 21 MEQ/L (ref 23–33)
COLOR CSF: COLORLESS
COLOR: YELLOW
CREAT SERPL-MCNC: 5.1 MG/DL (ref 0.4–1.2)
CREAT SERPL-MCNC: 5.2 MG/DL (ref 0.4–1.2)
CREATININE URINE: 29.1 MG/DL
CREATININE, URINE: 29.1 MG/DL
CRYPTOCOCCUS NEOFORMANS/GATTI CSF FILM ARR.: NOT DETECTED
CRYSTALS: ABNORMAL
CYTOMEGALOVIRUS (CMV) CSF FILM ARRAY: NOT DETECTED
ENTEROVIRUS DETECTION PCR: NOT DETECTED
EOSINOPHIL # BLD: 2.8 %
EOSINOPHIL SMEAR: NORMAL
EOSINOPHILS ABSOLUTE: 0.2 THOU/MM3 (ref 0–0.4)
EPITHELIAL CELLS, UA: ABNORMAL /HPF
ERYTHROCYTE [DISTWIDTH] IN BLOOD BY AUTOMATED COUNT: 14.3 % (ref 11.5–14.5)
ERYTHROCYTE [DISTWIDTH] IN BLOOD BY AUTOMATED COUNT: 47.2 FL (ref 35–45)
ESCHERICHIA COLI K1 CSF FILM ARRAY: NOT DETECTED
GFR SERPL CREATININE-BSD FRML MDRD: 11 ML/MIN/1.73M2
GFR SERPL CREATININE-BSD FRML MDRD: 11 ML/MIN/1.73M2
GLUCOSE BLD-MCNC: 100 MG/DL (ref 70–108)
GLUCOSE BLD-MCNC: 99 MG/DL (ref 70–108)
GLUCOSE, CSF: 60 MG/DL (ref 40–80)
GLUCOSE, CSF: 61 MG/DL (ref 40–80)
GLUCOSE, URINE: NEGATIVE MG/DL
HAEMOPHILUS INFLUENZA CSF FILM ARRAY: NOT DETECTED
HCT VFR BLD CALC: 39 % (ref 42–52)
HEMOGLOBIN: 12.3 GM/DL (ref 14–18)
HHV-6 (HERPESVIRUS 6) CSF FILM ARRAY: NOT DETECTED
HSV-1 CSF FILM ARRAY: NOT DETECTED
HSV-2 CSF FILM ARRAY: NOT DETECTED
IMMATURE GRANS (ABS): 0.04 THOU/MM3 (ref 0–0.07)
IMMATURE GRANULOCYTES: 0.6 %
KETONES, URINE: NEGATIVE
LEUKOCYTE ESTERASE, URINE: ABNORMAL
LISTERIA MONOCYTOGENES CSF FILM ARRAY: NOT DETECTED
LYMPHOCYTES # BLD: 17.9 %
LYMPHOCYTES ABSOLUTE: 1.1 THOU/MM3 (ref 1–4.8)
LYMPHS CSF: 4 % (ref 0–90)
MCH RBC QN AUTO: 28.6 PG (ref 26–33)
MCHC RBC AUTO-ENTMCNC: 31.5 GM/DL (ref 32.2–35.5)
MCV RBC AUTO: 90.7 FL (ref 80–94)
MICROALBUMIN UR-MCNC: 6.29 MG/DL
MICROALBUMIN/CREAT UR-RTO: 216 MG/G (ref 0–30)
MISCELLANEOUS LAB TEST RESULT: ABNORMAL
MONOCYTES # BLD: 10.9 %
MONOCYTES ABSOLUTE: 0.7 THOU/MM3 (ref 0.4–1.3)
MRSA SCREEN RT-PCR: NEGATIVE
NEISSERIA MENIGITIDIS CSF FILM ARRAY: NOT DETECTED
NITRITE, URINE: NEGATIVE
NUCLEATED RED BLOOD CELLS: 0 /100 WBC
PARECHOVIRUS CSF FILM ARRAY: NOT DETECTED
PATHOLOGIST REVIEW: NORMAL
PH UA: 6 (ref 5–9)
PLATELET # BLD: 156 THOU/MM3 (ref 130–400)
PMV BLD AUTO: 9.5 FL (ref 9.4–12.4)
POTASSIUM REFLEX MAGNESIUM: 4.5 MEQ/L (ref 3.5–5.2)
POTASSIUM SERPL-SCNC: 4.2 MEQ/L (ref 3.5–5.2)
PROTEIN CSF: 25 MG/DL (ref 12–60)
PROTEIN CSF: 25 MG/DL (ref 12–60)
PROTEIN UA: ABNORMAL MG/DL
RBC # BLD: 4.3 MILL/MM3 (ref 4.7–6.1)
RBC CSF: 2 /CUMM
RBC URINE: ABNORMAL /HPF
REASON FOR REJECTION: NORMAL
REJECTED TEST: NORMAL
RENAL EPITHELIAL, UA: ABNORMAL
SEG NEUTROPHILS: 67.3 %
SEGMENTED NEUTROPHILS ABSOLUTE COUNT: 4.3 THOU/MM3 (ref 1.8–7.7)
SODIUM BLD-SCNC: 136 MEQ/L (ref 135–145)
SODIUM BLD-SCNC: 138 MEQ/L (ref 135–145)
SODIUM URINE: 30 MEQ/L
SPECIFIC GRAVITY UA: < 1.005 (ref 1–1.03)
SPECIMEN: NORMAL
STREPTOCOCCUS AGALACTIAE CSF FILM ARRAY: NOT DETECTED
STREPTOCOCCUS PNEUMONIAE CSF FILM ARRAY: NOT DETECTED
TOTAL NUCLEATED CELLS CSF: 2 /CUMM (ref 0–5)
TUBE VOLUME RECEIVED CSF: 15 ML
UROBILINOGEN, URINE: 0.2 EU/DL (ref 0–1)
VANCOMYCIN RESISTANT ENTEROCOCCUS: NEGATIVE
VARICELLA-ZOSTER, PCR: DETECTED
WBC # BLD: 6.4 THOU/MM3 (ref 4.8–10.8)
WBC UA: > 200 /HPF
YEAST: ABNORMAL

## 2021-09-08 PROCEDURE — 82140 ASSAY OF AMMONIA: CPT

## 2021-09-08 PROCEDURE — 0BH18EZ INSERTION OF ENDOTRACHEAL AIRWAY INTO TRACHEA, VIA NATURAL OR ARTIFICIAL OPENING ENDOSCOPIC: ICD-10-PCS | Performed by: INTERNAL MEDICINE

## 2021-09-08 PROCEDURE — 2580000003 HC RX 258: Performed by: STUDENT IN AN ORGANIZED HEALTH CARE EDUCATION/TRAINING PROGRAM

## 2021-09-08 PROCEDURE — 2580000003 HC RX 258: Performed by: FAMILY MEDICINE

## 2021-09-08 PROCEDURE — 6370000000 HC RX 637 (ALT 250 FOR IP): Performed by: FAMILY MEDICINE

## 2021-09-08 PROCEDURE — 87077 CULTURE AEROBIC IDENTIFY: CPT

## 2021-09-08 PROCEDURE — 76770 US EXAM ABDO BACK WALL COMP: CPT

## 2021-09-08 PROCEDURE — 87086 URINE CULTURE/COLONY COUNT: CPT

## 2021-09-08 PROCEDURE — 82945 GLUCOSE OTHER FLUID: CPT

## 2021-09-08 PROCEDURE — 62270 DX LMBR SPI PNXR: CPT | Performed by: PSYCHIATRY & NEUROLOGY

## 2021-09-08 PROCEDURE — 99291 CRITICAL CARE FIRST HOUR: CPT | Performed by: HOSPITALIST

## 2021-09-08 PROCEDURE — 94660 CPAP INITIATION&MGMT: CPT

## 2021-09-08 PROCEDURE — 80048 BASIC METABOLIC PNL TOTAL CA: CPT

## 2021-09-08 PROCEDURE — 2500000003 HC RX 250 WO HCPCS

## 2021-09-08 PROCEDURE — 99223 1ST HOSP IP/OBS HIGH 75: CPT | Performed by: INTERNAL MEDICINE

## 2021-09-08 PROCEDURE — 87186 SC STD MICRODIL/AGAR DIL: CPT

## 2021-09-08 PROCEDURE — 70551 MRI BRAIN STEM W/O DYE: CPT

## 2021-09-08 PROCEDURE — 99291 CRITICAL CARE FIRST HOUR: CPT | Performed by: INTERNAL MEDICINE

## 2021-09-08 PROCEDURE — 87081 CULTURE SCREEN ONLY: CPT

## 2021-09-08 PROCEDURE — 94761 N-INVAS EAR/PLS OXIMETRY MLT: CPT

## 2021-09-08 PROCEDURE — 6370000000 HC RX 637 (ALT 250 FOR IP): Performed by: OPHTHALMOLOGY

## 2021-09-08 PROCEDURE — 87641 MR-STAPH DNA AMP PROBE: CPT

## 2021-09-08 PROCEDURE — 87500 VANOMYCIN DNA AMP PROBE: CPT

## 2021-09-08 PROCEDURE — 82570 ASSAY OF URINE CREATININE: CPT

## 2021-09-08 PROCEDURE — 31500 INSERT EMERGENCY AIRWAY: CPT | Performed by: INTERNAL MEDICINE

## 2021-09-08 PROCEDURE — 5A1955Z RESPIRATORY VENTILATION, GREATER THAN 96 CONSECUTIVE HOURS: ICD-10-PCS | Performed by: INTERNAL MEDICINE

## 2021-09-08 PROCEDURE — 6360000002 HC RX W HCPCS: Performed by: INTERNAL MEDICINE

## 2021-09-08 PROCEDURE — 87205 SMEAR GRAM STAIN: CPT

## 2021-09-08 PROCEDURE — 85025 COMPLETE CBC W/AUTO DIFF WBC: CPT

## 2021-09-08 PROCEDURE — 84157 ASSAY OF PROTEIN OTHER: CPT

## 2021-09-08 PROCEDURE — 89051 BODY FLUID CELL COUNT: CPT

## 2021-09-08 PROCEDURE — 82043 UR ALBUMIN QUANTITATIVE: CPT

## 2021-09-08 PROCEDURE — 009U3ZX DRAINAGE OF SPINAL CANAL, PERCUTANEOUS APPROACH, DIAGNOSTIC: ICD-10-PCS | Performed by: PSYCHIATRY & NEUROLOGY

## 2021-09-08 PROCEDURE — 2580000003 HC RX 258: Performed by: INTERNAL MEDICINE

## 2021-09-08 PROCEDURE — 81001 URINALYSIS AUTO W/SCOPE: CPT

## 2021-09-08 PROCEDURE — 84300 ASSAY OF URINE SODIUM: CPT

## 2021-09-08 PROCEDURE — 87070 CULTURE OTHR SPECIMN AEROBIC: CPT

## 2021-09-08 PROCEDURE — 71045 X-RAY EXAM CHEST 1 VIEW: CPT

## 2021-09-08 PROCEDURE — 2500000003 HC RX 250 WO HCPCS: Performed by: STUDENT IN AN ORGANIZED HEALTH CARE EDUCATION/TRAINING PROGRAM

## 2021-09-08 PROCEDURE — 36415 COLL VENOUS BLD VENIPUNCTURE: CPT

## 2021-09-08 PROCEDURE — 87798 DETECT AGENT NOS DNA AMP: CPT

## 2021-09-08 PROCEDURE — 94002 VENT MGMT INPAT INIT DAY: CPT

## 2021-09-08 PROCEDURE — 31500 INSERT EMERGENCY AIRWAY: CPT

## 2021-09-08 PROCEDURE — 89190 NASAL SMEAR FOR EOSINOPHILS: CPT

## 2021-09-08 PROCEDURE — 2000000000 HC ICU R&B

## 2021-09-08 RX ORDER — NOREPINEPHRINE BIT/0.9 % NACL 16MG/250ML
2-100 INFUSION BOTTLE (ML) INTRAVENOUS CONTINUOUS
Status: DISCONTINUED | OUTPATIENT
Start: 2021-09-08 | End: 2021-09-08

## 2021-09-08 RX ORDER — FENTANYL CITRATE 50 UG/ML
INJECTION, SOLUTION INTRAMUSCULAR; INTRAVENOUS
Status: DISPENSED
Start: 2021-09-08 | End: 2021-09-08

## 2021-09-08 RX ORDER — PROPOFOL 10 MG/ML
5-50 INJECTION, EMULSION INTRAVENOUS
Status: DISCONTINUED | OUTPATIENT
Start: 2021-09-08 | End: 2021-09-12

## 2021-09-08 RX ORDER — NOREPINEPHRINE BIT/0.9 % NACL 16MG/250ML
INFUSION BOTTLE (ML) INTRAVENOUS
Status: COMPLETED
Start: 2021-09-08 | End: 2021-09-08

## 2021-09-08 RX ORDER — 0.9 % SODIUM CHLORIDE 0.9 %
500 INTRAVENOUS SOLUTION INTRAVENOUS ONCE
Status: COMPLETED | OUTPATIENT
Start: 2021-09-08 | End: 2021-09-08

## 2021-09-08 RX ADMIN — SODIUM CHLORIDE 500 ML: 9 INJECTION, SOLUTION INTRAVENOUS at 19:47

## 2021-09-08 RX ADMIN — PREDNISOLONE ACETATE 1 DROP: 10 SUSPENSION/ DROPS OPHTHALMIC at 23:40

## 2021-09-08 RX ADMIN — Medication 2 MCG/MIN: at 08:57

## 2021-09-08 RX ADMIN — CALCIUM 500 MG: 500 TABLET ORAL at 20:41

## 2021-09-08 RX ADMIN — SODIUM CHLORIDE: 9 INJECTION, SOLUTION INTRAVENOUS at 04:00

## 2021-09-08 RX ADMIN — PREDNISOLONE ACETATE 1 DROP: 10 SUSPENSION/ DROPS OPHTHALMIC at 17:39

## 2021-09-08 RX ADMIN — SODIUM CHLORIDE: 9 INJECTION, SOLUTION INTRAVENOUS at 17:43

## 2021-09-08 RX ADMIN — PREDNISOLONE ACETATE 1 DROP: 10 SUSPENSION/ DROPS OPHTHALMIC at 00:09

## 2021-09-08 RX ADMIN — ACYCLOVIR SODIUM 650 MG: 50 INJECTION, SOLUTION INTRAVENOUS at 05:33

## 2021-09-08 RX ADMIN — SODIUM CHLORIDE, PRESERVATIVE FREE 10 ML: 5 INJECTION INTRAVENOUS at 15:35

## 2021-09-08 RX ADMIN — PROPOFOL 40 MCG/KG/MIN: 10 INJECTION, EMULSION INTRAVENOUS at 08:45

## 2021-09-08 RX ADMIN — PREDNISOLONE ACETATE 1 DROP: 10 SUSPENSION/ DROPS OPHTHALMIC at 05:33

## 2021-09-08 RX ADMIN — DEXTROSE MONOHYDRATE 5 MG/HR: 50 INJECTION, SOLUTION INTRAVENOUS at 17:31

## 2021-09-08 RX ADMIN — Medication 1000 UNITS: at 18:13

## 2021-09-08 RX ADMIN — SODIUM CHLORIDE: 9 INJECTION, SOLUTION INTRAVENOUS at 22:23

## 2021-09-08 ASSESSMENT — PAIN SCALES - WONG BAKER: WONGBAKER_NUMERICALRESPONSE: 0

## 2021-09-08 ASSESSMENT — PULMONARY FUNCTION TESTS
PIF_VALUE: 22
PIF_VALUE: 18
PIF_VALUE: 18
PIF_VALUE: 19

## 2021-09-08 NOTE — PROGRESS NOTES
Patient resting in bed, continues on bipap. VS taken map 63. Resident Samuel Gomez notified and comes to floor to assess patient. Patient being preped to go to MRI. Per Resident Samuel Gomez attempt to send patient down on N/C. Bipap mask removed. Patient noted not to be breathing. Patient remains hypersolmulent and only responds to painful stimuli. Patient bagged. 7469 Code blue initiated. Patient pulse detected. 9512 intensive care team responds and RT arrives. 0830 Etomidate given per Yoana Tejada RN  4059 Versed 4mg given. And ETT tube placed. 0834 sbp 134/97, heart rate 59, O2 sat 99%, no breath sounds noted at this time. 0837 7.5 ETT tube secured 22 at the lips, bilateral breath sounds positive. 0840 patient moved to ICU room 17.

## 2021-09-08 NOTE — PROCEDURES
Patient wife was told about the risks benefits and alternatives for lumbar puncture procedure. Informed consent was obtained. The patient was positioned in the left lateral fetal position. Anatomical landmarks were identified. The patient back was cleaned and draped in standard fashion. The L3-4 disc space was identified. Lidocaine was injected into the L3-4 disc space. 18-gauge spinal needle was used. Opening pressure 22. Clear fluid was obtained. Total fluids collected 12 mL.   Complications none

## 2021-09-08 NOTE — PROCEDURES
ICU PROCEDURE - ENDOTRACHEAL INTUBATION    Yara Mike     MRN#: [id number]  21      Lashonda Bush@[employer]     : 1940      INDICATION: CODE BLUE. Respiratory insufficiency with inability to protect airway. ANESTHESIA:   []Ketamine  []Ativan  [] Morphine  []Propofol  [x]Other medications: Etomidate    ESTIMATED BLOOD LOSS:  None. COMPLICATIONS:  [x]N/A  [] Other:    LARYNGOSCOPIC AIRWAY GRADE (CORMACK-LEHANE):[]1  []2a  []2b []3  [x]4        INTUBATION EQUIPMENT USED:  [x] Direct laryngoscope only    OUTCOME: Successful placement of #  7.5  Taperguard Evac endotracheal via   [x]Oral route    INSERTION DEPTH:  23    cm from   [x]lip           CONFIRMATION OF TUBE POSITION:   [x]Capnography - Strong & repeatable exhaled CO2 detection   [x]Multiple point auscultation   [x]SpO2 response   [x]STAT X-ray   []Bronchoscopic assessment    UNUSUAL FINDINGS:    PROCEDURE:     Using direct laryngoscopy, the vocal cords were visualized and the endotracheal tube was placed through the cords under direct vision. Good breath sounds were auscultated bilaterally without sounds over abdomen. Appropriate strong & repeatable exhaled CO2 detection was confirmed.        Electronically signed by Demetrio Sunshine MD on 2021 at 1:41 PM

## 2021-09-08 NOTE — PROGRESS NOTES
**This is a Medical/ PA/ APRN Student Note and is charted for educational purposes. The non-physician staff attested note is not to be used for billing purposes or to guide patient care. Please see the physician modifications/ attestation for treatment plan/suggestions. This note has been reviewed and feedback has been provided to the student. *                                                    Hospitalist Progress Note      Patient:  Brennon Pickard    Unit/Bed:4A-06/006-A  YOB: 1940  MRN: [id number]   Acct: [de-identified]   PCP: Kierra Ledesma  Date of Admission: 9/6/2021    Assessment/Plan:   1. 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 Periorbital rash of right eye that preceded pain symptoms, drainage and swelling of right eye. ID following and placed patient on 950 mg IV acyclovir.  9/7: Appointment made with outpatient ophthalmology and seen by Dr. Vicki Baird. Recommended prednisolone eye drops. Pharmacy recommending decreasing dose of acyclovir to 660mg based on renal function. 2. DEANDRE - Stage III   Possibly secondary to episode of hypotension or acyclovir.  9/7: Cr 2.3, GFR 27. Prior kidney function is Cr 0.7, GFR >90.  mL/hr started. Monitor renal function with daily BMP    3. Hx of hypertension   Takes lisinopril 20mg at home. Soft blood pressure readings in the hospital    9/7: Had episode of hypotension overnight. BP dropped to 75/56. Lisinopril held secondary DEANDRE    4. New onset Altered mental status    9/7: Patient lethargic, only responsive to sternal rub, confused. Procal 0.8, ammonia 33, CT head negative, ABG shows mild respiratory alkalosis. Patient has history of sleep apnea and placed on continuous BiPAP.  9/8: Patient only withdrawing to pain. MRI without contrast and lumbar punctured ordered for possible encephalitis. Consulted neurology.  Code blue called in the morning and endotracheal tube placed at 0835.     5. BPH   Flomax 0.4mg po qDay      Expected discharge date:  TBA    Disposition:    [x] Home       [] TCU       [] Rehab       [] Psych       [] SNF       [] [place]       [] Other-    Chief Complaint: Right eye pain and swelling    Hospital Treatment Course: The patient is a 62-year-old male with a past medical history of hypertension and BPH who presented to [facility] ED on 9/6 for complaints of right eye swelling and pain. Patient states this has been going on for 2 to 3 days. He tried Motrin and applied a wet damp cloth on the eye but found mild relief only. He did report some associated photophobia, and presentation of a small rash on the right orbital forehead. The rash continued to worsen, and was associated with itching. The patient initially came to the ED on 9/5 and was found to have corneal abrasions with bacterial conjunctivitis. He was given gentamicin ophthalmic drops, told to follow-up outpatient with Dr. Astrid Wick but return to the ED if symptoms worsened. 9/6 the patient came back to the ED complaining of worsening pain, swelling and unable to open his eye. He was started on IV acyclovir for concerns of herpes zoster's ophthalmicus. Patient was admitted for further evaluation and management. Subjective (past 24 hours): Overnight, patient had significant change in mentation per nurse. Patient was only responsive to pain, A&O x 0, and unable to assess. A Code blue was called because the patient had a witnessed HR of 0 by Dr. Eliel Quiros DO and patient was endotracheally intubated at [phone]. Patient was transferred to ICU. Past medical history, family history, social history and allergies reviewed again and is unchanged since admission. ROS (12 point review of systems completed. Pertinent positives noted.  Otherwise ROS is negative)     Medications:  Reviewed    Infusion Medications    sodium chloride      sodium chloride 125 mL/hr at 09/08/21 0400     Scheduled Medications    prednisoLONE acetate  1 drop Right Eye 4 times per day    [Held by provider] QUEtiapine  25 mg Oral BID    acyclovir  10 mg/kg (Ideal) IntraVENous Q12H    [Held by provider] baclofen  20 mg Oral TID    calcium elemental  500 mg Oral BID    [Held by provider] lisinopril  20 mg Oral Daily    [Held by provider] magnesium oxide  400 mg Oral 4x Daily AC & HS    vitamin B-6  200 mg Oral Daily    tamsulosin  0.4 mg Oral Daily    Vitamin D  1,000 Units Oral BID AC    zinc sulfate  50 mg Oral Daily    sodium chloride flush  5-40 mL IntraVENous 2 times per day     PRN Meds: sodium chloride flush, sodium chloride, ondansetron **OR** ondansetron, polyethylene glycol, acetaminophen **OR** acetaminophen, gabapentin, morphine      Intake/Output Summary (Last 24 hours) at 9/8/2021 0742  Last data filed at 9/8/2021 0345  Gross per 24 hour   Intake 1415.58 ml   Output 400 ml   Net 1015.58 ml       Diet:  ADULT DIET; Regular; Low Fat/Low Chol/High Fiber/2 gm Na    Exam:  BP 92/76   Pulse 52   Temp 97.9 °F (36.6 °C) (Axillary) Comment (Src): bipap on patient  Resp 21   Ht 5' 7\" (1.702 m)   Wt 205 lb 8 oz (93.2 kg)   SpO2 98%   BMI 32.19 kg/m²   General appearance: ppears stated age and cooperative. HEENT: Unable to assess  Neck: Unable to assess  Respiratory:  Normal respiratory effort. Clear to auscultation, bilaterally without Rales/Wheezes/Rhonchi. Cardiovascular: Regular rate and rhythm with normal S1/S2 without murmurs, rubs or gallops. Abdomen: Soft, non-tender, bowel sounds x4 quadrants.   Musculoskeletal: unable to assess  Skin: Periorbital vesicular rash along V1  Neurologic:  Withdraws to pain  Psychiatric: A&O x 0  Capillary Refill: Brisk,< 3 seconds   Peripheral Pulses: +2 palpable, equal bilaterally     Labs:   Recent Labs     09/05/21  1035 09/06/21  1219 09/07/21  0356   WBC 6.7 6.7 7.8   HGB 15.6 15.3 14.0   HCT 46.4 45.9 43.7    192 193     Recent Labs     09/05/21  1035 09/06/21  1219 09/07/21  0356    132* 136   K 3.9 3.9 4.2    97* 99   CO2 23 23 25   BUN 14 13 23*   CREATININE 0.9 0.7 2.3*   CALCIUM 9.2 9.1 9.0     Recent Labs     09/05/21  1035 09/07/21  0356   AST 18 17   ALT 12 10*   BILITOT 0.8 0.8   ALKPHOS 96 79     No results for input(s): INR in the last 72 hours. Recent Labs     09/05/21  1035   CKTOTAL 39*       Microbiology:    Blood culture #1: No results found for: BC    Blood culture #2:No results found for: Eloisa Irizarry    Organism:No results found for: ORG    No results found for: LABGRAM    MRSA culture only:No results found for: [address]    Urine culture: No results found for: LABURIN    Respiratory culture: No results found for: CULTRESP    Aerobic and Anaerobic :  No results found for: LABAERO  No results found for: LABANAE    Urinalysis:    No results found for: [place], BACTERIA, RBCUA, BLOODU, [address], [address]    Radiology:  CT HEAD WO CONTRAST   Final Result   Impression:   1. No acute intracranial hemorrhage or process identified. 2. Nonspecific tissue swelling at the right forehead and in the right    malar region. This document has been electronically signed by: Leanne Howell MD on    09/07/2021 11:02 PM      All CTs at this facility use dose modulation techniques and iterative    reconstructions, and/or weight-based dosing   when appropriate to reduce radiation to a low as reasonably achievable. FL LUMBAR PUNCTURE DIAG    (Results Pending)   XR CHEST PORTABLE    (Results Pending)   MRI BRAIN WO CONTRAST    (Results Pending)     CT HEAD WO CONTRAST    Result Date: 9/7/2021  Exam: CT brain without contrast Comparison: None Clinical history: Acute mental status change Findings: The ventricles are normal in size and position. No intracranial masses or midline shift identified. No abnormal extra-axial fluid collections are seen. No acute intracranial hemorrhage. The visualized paranasal sinuses are clear. No skull fracture identified.  Nonspecific tissue swelling at the right forehead and in the right malar region. Impression: 1. No acute intracranial hemorrhage or process identified. 2. Nonspecific tissue swelling at the right forehead and in the right malar region. This document has been electronically signed by: Keke Bello MD on 09/07/2021 11:02 PM All CTs at this facility use dose modulation techniques and iterative reconstructions, and/or weight-based dosing when appropriate to reduce radiation to a low as reasonably achievable. Support Devices (date placed):  [] ETT []Oral / [] Nasal  [] Gastric Tube [] OG / [] NG    [] Central Venous Line (Specify Site):  [] Urinary Catheter  [] Arterial Line (Specify Site)  [] Peripheral IV access  [] Other:    DVT prophylaxis: [] Lovenox                                 [] SCDs                                 [] SQ Heparin                                 [] Encourage ambulation           [] Already on Anticoagulation     Code Status: Full Code    Tele:   [x] yes             [] no    Electronically signed by Mao Xavier on 9/8/2021 at 7:42 AM     **This is a Medical/ PA/ APRN Student Note and is charted for educational purposes. The non-physician staff attested note is not to be used for billing purposes or to guide patient care. Please see the physician modifications/ attestation for treatment plan/suggestions. This note has been reviewed and feedback has been provided to the student.  *

## 2021-09-08 NOTE — PROGRESS NOTES
Pharmacy Renal Adjustment    Maria R Sender is a [de-identified] y.o. male. Pharmacy renally adjust the following medications per P&T approved policy: acyclovir    Recent Labs     09/06/21  1219 09/07/21  0356   BUN 13 23*   CREATININE 0.7 2.3*     Estimated Creatinine Clearance: 28 mL/min (A) (based on SCr of 2.3 mg/dL (H)).   Calculated CrCl:    Height:   Ht Readings from Last 1 Encounters:   09/06/21 5' 7\" (1.702 m)     Weight:  Wt Readings from Last 1 Encounters:   09/06/21 210 lb (95.3 kg)     CKD stage: 2         Baseline SCr: 0.7    Plan: Adjustments based on renal function:          Decrease acyclovir to 660mg (10mg/kg using IBW) q12h    Written by Gloria Viveros [facility]/charted by [address]

## 2021-09-08 NOTE — SIGNIFICANT EVENT
Per RN, patient was seen twice rot altered mental status. And apneic spells in sleep  The patient mental status 1/3 only oriented to the person. Vital signs are stable within normal range. On neuro exam no focal deficit found. CT showed no acute changes  Lab updates -normal POC glucose, ammonia level, lactic acid,  And only arterial blood gas showed mild decreased CO2 and HCO3. Talked over phone with family membeers (wife/daughter) anf found followings;  -patient diagnosed with sleep apnea 2 yrs ago, but never treated  -pt is recently treated with gait/balance abnormalities by PT at [facility]  -pt has frequent awakenings in sleep at baseline, and mostly confused right after awakenings.     BiPAP, pulse oxymetry ordered

## 2021-09-08 NOTE — FLOWSHEET NOTE
09/08/21 0850   Encounter Summary   Services provided to: Patient   Referral/Consult From: Nursing Supervisor/Manager   Support System Spouse; Children   Continue Visiting Yes  (9/8 Pt Coded)   Complexity of Encounter Moderate   Length of Encounter 30 minutes   Routine   Type Initial   Crisis   Type Code   Assessment Unable to respond   Intervention Prayer   Assessment:  During my contact with the [de-identified] yr old patient he had coded (in [facility]) and was being attend to by the medical staff. The pt is listed as a full code and there was no Advance Directive on file. The pt's spouse Maddie Jones) was contacted by the staff and will be in route to ICU. The pt was originally admitted due to herpes zoster ophthalmicus. Interventions:  I offered prayer of healing and prayed for safe travels for the pt's spouse and his family. Plan:  The pt was transported to ICU. Continued prayer for the pt and family would be very helpful.

## 2021-09-08 NOTE — PROGRESS NOTES
Nursing notified me the patient is now A&O x 1 since returning from ophthalmology office. Patient was A&O x 4 previously. Patient has been hypotensive and had DEANDRE on morning labs. Patient has had fluids at 125ml/hr for >24 hours with only 100mL measured urine out. Notified resident of changes.     Walter Rendon PA-C

## 2021-09-08 NOTE — CARE COORDINATION
Discharge planning update:    Pt with significant critical event this morning requiring intubation. MRI Brain with no acute findings. ID following. Levophed, Propofol drips, IV Acyclovir. Transferred to ICU. [place] LOCKETT Case Manager updated.   Electronically signed by Charles Gonzalez RN on 9/8/2021 at 1:53 PM

## 2021-09-08 NOTE — PROGRESS NOTES
2105: RN notified ITZEL Troy that patient is lethargic, waking only to sternal rub and nail bed pressure. Patient confused. Alert to self only. Repeating words over and over. Delayed responses. Diaphoretic. RN checking patient blood glucose, resulted 96.    2115: Dr. Jay Ham at bedside. 2123: Acyclovir dosage decreased. Procal, blood cultures, and lumbar puncture ordered. Mares catheter ordered. Patient self caths, has not self cathed since 4pm.     2200: 14fr coude mares catheter inserted with sterile procedure and urine return noted. 2147: Blood gases ordered. 2222: ABG completed. Results for Lou Garcia (MRN [id number]) as of 9/8/2021 00:23   Ref. Range 9/7/2021 22:22   Source: Unknown R Radial   pH, Blood Gas Latest Ref Range: 7.35 - 7.45  7.43   PCO2 Latest Ref Range: 35 - 45 mmhg 33 (L)   pO2 Latest Ref Range: 71 - 104 mmhg 79   HCO3 Latest Ref Range: 23 - 28 mmol/l 22 (L)   Base Excess (Calculated) Latest Ref Range: -2.5 - 2.5 mmol/l -2.0   O2 Sat Latest Units: % 96   Jason Test Unknown Positive   DEVICE Unknown Room Air   COLLECTED BY: Unknown [id number]       6378: STAT CT head ordered. 2245: STAT CT head completed. Resulted negative for acute changes. 0013: RN in to assess patient and obtain vital signs. Patient vital signs  Vitals:    09/08/21 0000   BP: 95/64   Pulse: 62   Resp: 14   Temp: 97.7 °F (36.5 °C)   SpO2: 95%     RN messaged Dr. Jay Ham, to notify that patient still has decreased responsiveness, responds to sternal rub, has had 20-30 seconds of apnea. Waiting on response. 0105: Dr. Jay Ham and Dr. Maryam Farley at bedside to assess patient. 0115: RN called patient Wife Nadine Renee to ask about patient's baseline. Patient walks with walker and has trouble walking at home, he doesn't walk well in general, Lucrecia states. She states that he does have difficulty with memory however he is usually able to tell you where he is at, the month and date.  She states that he is hard to awake at

## 2021-09-08 NOTE — PROGRESS NOTES
Hospitalist Progress Note    Patient:  Tao Ronquillo      Unit/Bed:4D-17/017-A    YOB: 1940    MRN: [id number]       Acct: [de-identified]     PCP: Yulisa Gil    Date of Admission: 9/6/2021    Assessment/Plan:    1. 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 Started on IV acyclovir 950 mg IV q12hrs. ID following. 9/7 transported to outpatient ophthalmology center for evaluation. Ophthalmology recommended prednisolone eye drops q6hrs until next appointment 9/20.    2. Acute hypoxic respiratory failure - unclear etiology, likely secondary to encephalopathic infection. - 9/8 multiple apneic events overnight. History of untreated GABRIEL, no prior sleep study. Initially started on BiPAP. Noted hypotension and bradycardia overnight. HR dropped to 0, CODE BLUE called. Required intubation for airway protection. Transferred to ICU for further BP and respiratory management. 3. 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 Prior CT Head from 9/7 revealed no acute intracranial hemorrhage, nonspecific tissue swelling at right forehead and right maller region. Neurology consulted. - Meningitis/encephalitis panel revealed varicella zoster PCR. Currently on IV acyclovir. ID following. 4. Hypotension - secondary to infection        - See above    5. Symptomatic Bradycardia - secondary to infection        - See above    6. Acute Kidney Injury - Stage III, likely secondary to hypotension        - No history of kidney injury. Likely secondary to hypotension, but acyclovir may be contributory. Noted increased 9/5 from Cr 0.6 to 2.3        - 9/7 Elevated Cr 5.1 from previous 9/6 Cr 2.3. Started  mL/hr. continue to monitor renal function with daily BMP. 7. Hypertension        - Takes home lisinopril 20 mg po qDay.         - Lisinopril held secondary to DEANDRE. 9/8 Currently hypotensive    8. BPH        - Takes Flomax 0.4 mg po qDay. Flomax held secondary to DEANDRE. Expected discharge date:  TBD    Disposition:    [x] Home       [] TCU       [] Rehab       [] Psych       [] SNF       [] [place]       [] Other-    Chief Complaint: Right eye pain and swelling     Hospital Course: The patient is a 67-year-old male with a past medical history of hypertension and BPH who presented to [facility] ED on 9/6 for complaints of right eye swelling and pain. Patient states this has been going on for 2 to 3 days. He tried Motrin and applied a wet damp cloth on the eye but found mild relief only. He did report some associated photophobia, and presentation of a small rash on the right orbital forehead. The rash continued to worsen, and was associated with itching. The patient initially came to the ED on 9/5 and was found to have corneal abrasions with bacterial conjunctivitis. He was given gentamicin ophthalmic drops, told to follow-up outpatient with Dr. Mirta Lanza but return to the ED if symptoms worsened. 9/6 the patient came back to the ED complaining of worsening pain, swelling and unable to open his eye. He was started on IV acyclovir for concerns of herpes zoster's ophthalmicus. Patient was admitted for further evaluation and management. 9/6 patient was noted to be hypertensive with SBPs in the 90s with single event of BP 75/56. He was started on fluid resuscitation. 9/7 the patient was doing well, denied any complaints. He had no associated photophobia, pain or itchiness. ID was consulted. He was transported to ophthalmology center where he was prescribed prednisone eyedrops. Subjective (past 24 hours):   Overnight the patient was noted to have apneic episodes and be bradycardic in mid 40s-50s. Night team was called. Per patient's wife, he was diagnosed with GABRIEL two years ago which is untreated and no sleep study.   The patient was put on BiPAP. His vitals stabilized. This morning the patient was difficult to arouse, responsive only to pain. Unable to assess review of systems. Shortly after his heart rate dropped to 0. CODE BLUE was initiated. The patient was intubated and transferred to ICU. ROS (12 point review of systems completed. Pertinent positives noted. Otherwise ROS is negative). Medications:  Reviewed    Infusion Medications    norepinephrine Stopped (09/08/21 1003)    propofol Stopped (09/08/21 1415)    sodium chloride      sodium chloride 125 mL/hr at 09/08/21 0400     Scheduled Medications    fentaNYL        [START ON 9/9/2021] acyclovir  10 mg/kg (Ideal) IntraVENous Q24H    prednisoLONE acetate  1 drop Right Eye 4 times per day    [Held by provider] QUEtiapine  25 mg Oral BID    [Held by provider] baclofen  20 mg Oral TID    calcium elemental  500 mg Oral BID    [Held by provider] lisinopril  20 mg Oral Daily    [Held by provider] magnesium oxide  400 mg Oral 4x Daily AC & HS    vitamin B-6  200 mg Oral Daily    [Held by provider] tamsulosin  0.4 mg Oral Daily    Vitamin D  1,000 Units Oral BID AC    zinc sulfate  50 mg Oral Daily    sodium chloride flush  5-40 mL IntraVENous 2 times per day     PRN Meds: sodium chloride flush, sodium chloride, ondansetron **OR** ondansetron, polyethylene glycol, acetaminophen **OR** acetaminophen, gabapentin, morphine      Intake/Output Summary (Last 24 hours) at 9/8/2021 1539  Last data filed at 9/8/2021 1426  Gross per 24 hour   Intake 2769.09 ml   Output 950 ml   Net 1819.09 ml       Diet:  ADULT DIET; Regular; Low Fat/Low Chol/High Fiber/2 gm Na    Exam:  BP 98/69   Pulse 71   Temp 97 °F (36.1 °C) (Rectal)   Resp 12   Ht 5' 7\" (1.702 m)   Wt 207 lb 7.3 oz (94.1 kg)   SpO2 100%   BMI 32.49 kg/m²     General appearance: Intubated, non-responsive   HEENT: Right eye sclera injected with crusting and drainage. Periorbital vesicular rash.    Neck: Supple, with full range of motion. No jugular venous distention. Trachea midline. Respiratory:  Decreased respiratory effort. On mechanical ventilation. Bilateral rhonchi. Cardiovascular: Regular rate and rhythm with normal S1/S2 without murmurs, rubs or gallops. Abdomen: Soft, non-tender, non-distended with normal bowel sounds. Musculoskeletal: passive and active ROM x 4 extremities. Trace bilateral extremity edema  Skin: Right sided periorbital vesicular rash along V1 distribution. Neurologic:  Unable to assess secondary to mentation  Psychiatric: Unable to assess secondary to mentation  Capillary Refill: Brisk,< 3 seconds   Peripheral Pulses: +2 palpable, equal bilaterally       Labs:   Recent Labs     09/06/21  1219 09/07/21  0356 09/08/21  0906   WBC 6.7 7.8 6.4   HGB 15.3 14.0 12.3*   HCT 45.9 43.7 39.0*    193 156     Recent Labs     09/06/21  1219 09/07/21  0356 09/08/21  0906   * 136 136   K 3.9 4.2 4.5   CL 97* 99 102   CO2 23 25 19*   BUN 13 23* 40*   CREATININE 0.7 2.3* 5.1*   CALCIUM 9.1 9.0 8.3*     Recent Labs     09/07/21  0356   AST 17   ALT 10*   BILITOT 0.8   ALKPHOS 79     No results for input(s): INR in the last 72 hours. No results for input(s): Joaan Highman in the last 72 hours. Microbiology:    9/8 Meningitis/Encephalitis panel - varicella-zoster positive  9/7 Blood culture 1 - no preliminary growth  9/7 Blood culture 2 - no preliminary growth     Urinalysis:      Lab Results   Component Value Date    NITRU NEGATIVE 09/08/2021    WBCUA > 200 09/08/2021    BACTERIA FEW 09/08/2021    RBCUA 3-5 09/08/2021    BLOODU MODERATE 09/08/2021    SPECGRAV <1.005 09/08/2021       Radiology:  MRI BRAIN WO CONTRAST   Final Result       1. No acute findings. 2. Mild severity chronic small vessel ischemic changes. **This report has been created using voice recognition software. It may contain minor errors which are inherent in voice recognition technology. **      Final report electronically signed by Dr. Bev Pitt on 9/8/2021 11:48 AM      XR CHEST PORTABLE   Final Result   No acute cardiopulmonary process. **This report has been created using voice recognition software. It may contain minor errors which are inherent in voice recognition technology. **      Final report electronically signed by Dr. Bev Pitt on 9/8/2021 7:56 AM      CT HEAD WO CONTRAST   Final Result   Impression:   1. No acute intracranial hemorrhage or process identified. 2. Nonspecific tissue swelling at the right forehead and in the right    malar region. This document has been electronically signed by: Oleg Christensen MD on    09/07/2021 11:02 PM      All CTs at this facility use dose modulation techniques and iterative    reconstructions, and/or weight-based dosing   when appropriate to reduce radiation to a low as reasonably achievable.       FL LUMBAR PUNCTURE DIAG    (Results Pending)   US RENAL COMPLETE    (Results Pending)   XR CHEST PORTABLE    (Results Pending)       DVT prophylaxis: [] Lovenox                                 [x] SCDs                                 [] SQ Heparin                                 [] Encourage ambulation           [] Already on Anticoagulation     Code Status: Full Code    PT/OT Eval Status: N/A    Tele:   [x] yes             [] no    Electronically signed by Luis Enrique Montes DO on 9/8/2021 at 3:39 PM

## 2021-09-08 NOTE — FLOWSHEET NOTE
Patient arrived to unit from  via bed. Patient transferred to ICU bed and placed on continuous ICU bedside monitor. Patient admitted for Herpes zoster ophthalmicus [B02.30]. Vitals obtained. See flowsheets. Patient's IV access includes RH. Current infusions and rates of infusion include [email or web address], Propofol @40mc. Assessment completed by Edinson Webb. Two nurse skin assessment completed by Jose LOCKETT and Ronn Lehman. See flowsheets for assessment details. Policies and procedures of ICU unable to be explained to patient at this time. Family member(s)/representative(s) present at time of admission include none. Patient rights explained to family member(s)/representatives and patient, as able. Patient/patient's family member(s)/representative(s) N/A to have physician notified of their admission. All questions posed by patient's family member(s)/representative(s) and patient answered at this time. 0850 Propofol stopped.

## 2021-09-08 NOTE — PROGRESS NOTES
Renal Adjustment Follow-up    Recent Labs     09/07/21  0356 09/08/21  0906   BUN 23* 40*   CREATININE 2.3* 5.1*     Estimated Creatinine Clearance: 13 mL/min (A) (based on SCr of 5.1 mg/dL [facility])). Plan: Change acyclovir to 10 mg/kg using IBW every 24 hours.    Gurdeep MorejonD, BCPS 9/8/2021 10:43 AM

## 2021-09-08 NOTE — SIGNIFICANT EVENT
Patient seen by me. Case discussed with resident physician. Patient critically ill requiring intubation for airway protection. Patient with severe deterioration in neurologic status. Patient with zoster infection of the face and eye on the right. Continue with acyclovir. Acute deterioration in renal function. Awaiting urinalysis with microscopy, fractional secretion of sodium, and renal ultrasound. High risk of requiring dialysis. Hypotensive undergoing volume resuscitation and pressors. .  Italicized font represents changes to the note made by me. CC time 35 minutes. Time was discontiguous. Time does not include procedures. Time does include my direct assessment of the patient and coordination of care.   Electronically signed by Keturah Moritz, MD on 9/8/2021 at 1:42 PM

## 2021-09-08 NOTE — CONSULTS
[facility]    [address]  9/8/2021 6:26 PM    Pt Name:    Rao Wyman  MRN:     [id number]   [id number]  YOB: 1940  Admit Date:    9/6/2021 11:26 AM  Primary Care Physician:  Andres Saunders    [facility] Number:   [id number]    Reason for Consult:  Acute kidney injury  Requesting provider:  Dr. Meme Duff    History:   The patient is a [de-identified] y.o. male with history of hypertension and BPH who was admitted to the hospital 2 days ago after he was evaluated in the emergency room for complaints of right eye swelling and pain. Patient apparently took some nonsteroidals. He also reported some photophobia and was noted to have a small rash over his right forehead which rapidly got worse. He was recently seen in the emergency room and was diagnosed with bacterial conjunctivitis and was given gentamicin drops and was told to see his eye doctor. However because his symptoms worsened patient presented to the emergency room a day later. Patient apparently had some photophobia, right eye drainage and headaches mostly on the right side on admission. He was seen by infectious disease. Patient was started on IV acyclovir. Earlier today patient was a CODE BLUE due to altered mental status and respiratory failure associated with hypoxia. See chart for details. Blood pressure was in systolic 22S at that time. He was transferred to ICU. He was intubated. Currently he is on 30% FiO2 with PEEP of 6. He is currently on normal saline as well as Cardene drip.     Past Medical History:  Past Medical History:   Diagnosis Date    Hypertension        Past Surgical History:  Past Surgical History:   Procedure Laterality Date    COLONOSCOPY      PROSTATE SURGERY      TONSILLECTOMY         Family History:  Family History   Problem Relation Age of Onset    High Blood Pressure Mother     No Known Problems Father        Social History:  Social History     Socioeconomic History    Marital status:      Spouse name: Not on file    Number of children: Not on file    Years of education: Not on file    Highest education level: Not on file   Occupational History    Not on file   Tobacco Use    Smoking status: Former Smoker     Packs/day: 1.00     Years: 10.00     Pack years: 10.00     Types: Cigarettes     Quit date: 1970     Years since quittin.0    Smokeless tobacco: Never Used   Substance and Sexual Activity    Alcohol use: No    Drug use: No    Sexual activity: Yes     Partners: Female   Other Topics Concern    Not on file   Social History Narrative    Not on file     Social Determinants of Health     Financial Resource Strain:     Difficulty of Paying Living Expenses:    Food Insecurity:     Worried About Running Out of Food in the Last Year:     [address] in the Last Year:    Transportation Needs:     Lack of Transportation (Medical):  Lack of Transportation (Non-Medical):    Physical Activity:     Days of Exercise per Week:     Minutes of Exercise per Session:    Stress:     Feeling of Stress :    Social Connections:     Frequency of Communication with Friends and Family:     Frequency of Social Gatherings with Friends and Family:     Attends [employer] Services:     Active Member of Clubs or Organizations:     Attends Club or Organization Meetings:     Marital Status:    Intimate Partner Violence:     Fear of Current or Ex-Partner:     Emotionally Abused:     Physically Abused:     Sexually Abused:        Home Meds:  Prior to Admission medications    Medication Sig Start Date End Date Taking?  Authorizing Provider   Cyanocobalamin (VITAMIN B 12 PO) Take by mouth daily   Yes Historical Provider, MD   vitamin D3 (CHOLECALCIFEROL) 400 units TABS tablet Take 800 Units by mouth 2 times daily (before meals)    Yes Historical Provider, MD   calcium carbonate (OSCAL) 500 MG TABS tablet Take 500 mg by mouth 2 Systems:  Cannot be obtained at this time. Patient is noncommunicative. He is mechanically ventilated. Current Meds:  Infusion:    propofol Stopped (09/08/21 1415)    niCARdipine 5 mg/hr (09/08/21 1731)    sodium chloride      sodium chloride 125 mL/hr at 09/08/21 1743     Meds:    fentaNYL        [START ON 9/9/2021] acyclovir  10 mg/kg (Ideal) IntraVENous Q24H    prednisoLONE acetate  1 drop Right Eye 4 times per day    [Held by provider] QUEtiapine  25 mg Oral BID    [Held by provider] baclofen  20 mg Oral TID    calcium elemental  500 mg Oral BID    [Held by provider] lisinopril  20 mg Oral Daily    [Held by provider] magnesium oxide  400 mg Oral 4x Daily AC & HS    vitamin B-6  200 mg Oral Daily    [Held by provider] tamsulosin  0.4 mg Oral Daily    Vitamin D  1,000 Units Oral BID AC    zinc sulfate  50 mg Oral Daily    sodium chloride flush  5-40 mL IntraVENous 2 times per day     Meds prn: sodium chloride flush, sodium chloride, ondansetron **OR** ondansetron, polyethylene glycol, acetaminophen **OR** acetaminophen, gabapentin, morphine     Allergies/Intolerances: ALLERGIES: Patient has no known allergies. 24HR INTAKE/OUTPUT:      Intake/Output Summary (Last 24 hours) at 9/8/2021 1826  Last data filed at 9/8/2021 1426  Gross per 24 hour   Intake 2769.09 ml   Output 950 ml   Net 1819.09 ml     I/O last 3 completed shifts: In: 2769.1 [P.O.:50; I.V.:2719.1]  Out: 950 [Urine:950]  No intake/output data recorded. Admission weight: 210 lb (95.3 kg)  Wt Readings from Last 3 Encounters:   09/08/21 207 lb 7.3 oz (94.1 kg)   09/05/21 210 lb (95.3 kg)   03/19/19 207 lb 3.2 oz (94 kg)     Body mass index is 32.49 kg/m².     Physical Examination:  VITALS:   Vitals:    09/08/21 1330 09/08/21 1345 09/08/21 1519 09/08/21 1600   BP: 98/69   (!) 185/99   Pulse: 55 56 71 82   Resp: 12 12 12 16   Temp:    98.8 °F (37.1 °C)   TempSrc:    Oral   SpO2: 100% 100% 100% 99%   Weight:       Height: Weight:   Wt Readings from Last 3 Encounters:   09/08/21 207 lb 7.3 oz (94.1 kg)   09/05/21 210 lb (95.3 kg)   03/19/19 207 lb 3.2 oz (94 kg)     Constitutional and General Appearance: Ill-appearing, intubated toxic appearing  Eyes: no icteric sclera in left eye or right eye,  no pallor conjunctiva in left or right eye  Oral: ET tube present  Neck: Multiple lines  Lungs: Diminished without any rales or any rhonchi  Heart: regular rate, S1, S2  Extremities: No pitting LE edema, no tenderness  GI: soft, non-tender, no guarding, no distention  Skin: Vesicular rash over his right forehead area  Musculo: No obvious joint deformities noted  Neuro: Cannot be assessed  Psychiatric: Cannot be assessed    Lab Data  CBC:   Recent Labs     09/06/21  1219 09/07/21  0356 09/08/21  0906   WBC 6.7 7.8 6.4   HGB 15.3 14.0 12.3*   HCT 45.9 43.7 39.0*    193 156     BMP:  Recent Labs     09/06/21  1219 09/07/21  0356 09/08/21  0906   * 136 136   K 3.9 4.2 4.5   CL 97* 99 102   CO2 23 25 19*   BUN 13 23* 40*   CREATININE 0.7 2.3* 5.1*   GLUCOSE 109* 110* 99   CALCIUM 9.1 9.0 8.3*     PTH: @PTH@  TSH: No results for input(s): TSH in the last 72 hours. HgBa1c: No results for input(s): LABA1C in the last 72 hours. Hepatic:   Recent Labs     09/07/21  0356   LABALBU 3.7   AST 17   ALT 10*   BILITOT 0.8   ALKPHOS 79       Additional Labs: Urine sodium 30, urine creatinine 29  Diagnostics: Chest x-ray negative for any acute cardiopulmonary disease  Ultrasound negative for any acute changes      Impression and Plan:  1. Acute kidney injury likely secondary to prerenal causes although cannot rule out evolving ATN in setting of recent hypotension. Urine sodium is 30. Patient is currently nonoliguric. We will resuscitate with IV fluids. I have ordered another bolus of 500 ml over 2 hours. Continue with aggressive IV fluid hydration for now. Will repeat labs.   If no significant improvement then may even require renal replacement therapy. If urine output drops then patient may require loop diuretics for \"forced diuresis. \"  Currently he is nonoliguric. Will trend serum creatinine. May even need to consider dialysis for IV acyclovir induced possible neurotoxicity. IV acyclovir can cause crystal-induced acute kidney injury and tubular obstruction. 2. Mild metabolic acidosis. We will add sodium bicarbonate. 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 Herpes zoster ophthalmicus  5. Mild hyponatremia  6. Hypertension. Currently on Cardene drip. Lisinopril on hold due to DEANDRE  7. History of BPH  8. Medications reviewed  Plan of care reviewed with the nursing staff  Thank you for the consult. Please feel free to call me if you have any questions.      Jessica Paula MD  [facility]

## 2021-09-08 NOTE — FLOWSHEET NOTE
[facility] PROGRESS NOTE      Patient: Susan Teague  Room #: 4Z-89/764-N            YOB: 1940  Age: [de-identified] y.o. Gender: male            Admit Date & Time: 9/6/2021 11:26 AM    Assessment:  Tiara Bishop is an [de-identified]year old male who is not responsive and is alone in his room on ICU, 4D. His nurse reports that his family had gone home recently and will be back. Interventions:  Silent prayer from doorway. Outcomes:      Plan:    1. Continue to reach out to pt and his family as is needed for their spiritual care.      Electronically signed by Sathya Gonzalez on 9/8/2021 at 3:12 PM.  [facility]  [phone]

## 2021-09-08 NOTE — PROGRESS NOTES
[facility] 61  OCCUPATIONAL THERAPY MISSED TREATMENT NOTE  [facility]      Date: 2021  Patient Name: Ofelia Diamond        CSN: [id number]   : 1940  ([de-identified] y.o.)  Gender: male                REASON FOR MISSED TREATMENT: Discharge therapy orders at this time. Code blue called this AM and pt being intubated and transferred off unit. Please re-order as appropriate.

## 2021-09-08 NOTE — PROGRESS NOTES
Progress note: Infectious diseases    Patient - Jessica Beasley,  Age - [de-identified] y.o.    - 1940      Room Number - 4D-17/017-A   MRN -  [id number]   Acct # - [de-identified]  Date of Admission -  2021 11:26 AM    SUBJECTIVE:   He is in ICU on Vent. Preliminary csf negative  He is sedated on the vent  Events noted, MRI report negative. OBJECTIVE   VITALS    height is 5' 7\" (1.702 m) and weight is 207 lb 7.3 oz (94.1 kg). His rectal temperature is 97 °F (36.1 °C). His blood pressure is 98/69 and his pulse is 56. His respiration is 12 and oxygen saturation is 100%.        Wt Readings from Last 3 Encounters:   21 207 lb 7.3 oz (94.1 kg)   21 210 lb (95.3 kg)   19 207 lb 3.2 oz (94 kg)       I/O (24 Hours)    Intake/Output Summary (Last 24 hours) at 2021 1435  Last data filed at 2021 1426  Gross per 24 hour   Intake 2769.09 ml   Output 950 ml   Net 1819.09 ml       General Appearance  Sedated on the vent  HEENT - he has right side V1 distribution blistering eruption  Neck - Supple, no mass  Lungs -  Bilateral  air entry, on the vent  Cardiovascular - Heart sounds are normal.    Abdomen - soft, not distended, nontender,   Neurologic -sedated on the vent  Skin - No bruising or bleeding  Extremities - No edema, no cyanosis, clubbing     MEDICATIONS:      fentaNYL        [START ON 2021] acyclovir  10 mg/kg (Ideal) IntraVENous Q24H    prednisoLONE acetate  1 drop Right Eye 4 times per day    [Held by provider] QUEtiapine  25 mg Oral BID    [Held by provider] baclofen  20 mg Oral TID    calcium elemental  500 mg Oral BID    [Held by provider] lisinopril  20 mg Oral Daily    [Held by provider] magnesium oxide  400 mg Oral 4x Daily AC & HS    vitamin B-6  200 mg Oral Daily    [Held by provider] tamsulosin  0.4 mg Oral Daily    Vitamin D  1,000 Units Oral BID AC    zinc sulfate  50 mg Oral Daily    sodium chloride flush  5-40 mL IntraVENous 2 times per day      norepinephrine Stopped (09/08/21 1003)    propofol Stopped (09/08/21 1415)    sodium chloride      sodium chloride 125 mL/hr at 09/08/21 0400     sodium chloride flush, sodium chloride, ondansetron **OR** ondansetron, polyethylene glycol, acetaminophen **OR** acetaminophen, gabapentin, morphine      LABS:     CBC:   Recent Labs     09/06/21  1219 09/07/21  0356 09/08/21  0906   WBC 6.7 7.8 6.4   HGB 15.3 14.0 12.3*    193 156     BMP:    Recent Labs     09/06/21  1219 09/07/21  0356 09/08/21  0906   * 136 136   K 3.9 4.2 4.5   CL 97* 99 102   CO2 23 25 19*   BUN 13 23* 40*   CREATININE 0.7 2.3* 5.1*   GLUCOSE 109* 110* 99     Calcium:  Recent Labs     09/08/21  0906   CALCIUM 8.3*    Glucose:  Recent Labs     09/07/21  2102   POCGLU 96     HgbA1C: No results for input(s): LABA1C in the last 72 hours. INR: No results for input(s): INR in the last 72 hours. Hepatic:   Recent Labs     09/07/21  0356   ALKPHOS 79   ALT 10*   AST 17   PROT 6.2   BILITOT 0.8   LABALBU 3.7     Amylase and Lipase:  Recent Labs     09/07/21  2132   LACTA 0.9     Lactic Acid:   Recent Labs     09/07/21  2132   LACTA 0.9     Troponin: No results for input(s): CKTOTAL, CKMB, TROPONINI in the last 72 hours. BNP: No results for input(s): BNP in the last 72 hours.     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         ASSESSMENT/PLAN   HZ opthalmicus  Respiratory failure:  DEANDRE: dose of acyclovir was adjusted getting iv fluid. encephlopathy :  molecular panel is positive for VZ virus on CSF. likely contributing to the change in mentation. Continue iv acyclovir.   Consult nephrology if ok with primary      Priscila Chacon MD, MD, [address] 9/8/2021 2:35 PM

## 2021-09-08 NOTE — CONSULTS
Neurology Consult Note    Date:9/8/2021       [facility]:4V-52/625-E  Patient Name:Miah Blank     YOB: 1940     Age:81 y.o. Requesting Physician: Jamila Brandon MD     Reason for Consult:  Evaluate for AMS      Chief Complaint: Altered Mental Status    Subjective     Manny Boucher is an [de-identified] M with PMH of HTN who presented to [facility] with painful lesions, facial swelling since and R eye drainage and photophobia which began to develop on 9/4/21. History of shingles unknown, no hx of shingles immunization. No additional history available due to the condition of the patient. Review of Systems   Review of Systems   Unable to perform ROS: Intubated       Medications   Scheduled Meds:    fentaNYL        [START ON 9/9/2021] acyclovir  10 mg/kg (Ideal) IntraVENous Q24H    prednisoLONE acetate  1 drop Right Eye 4 times per day    [Held by provider] QUEtiapine  25 mg Oral BID    [Held by provider] baclofen  20 mg Oral TID    calcium elemental  500 mg Oral BID    [Held by provider] lisinopril  20 mg Oral Daily    [Held by provider] magnesium oxide  400 mg Oral 4x Daily AC & HS    vitamin B-6  200 mg Oral Daily    [Held by provider] tamsulosin  0.4 mg Oral Daily    Vitamin D  1,000 Units Oral BID AC    zinc sulfate  50 mg Oral Daily    sodium chloride flush  5-40 mL IntraVENous 2 times per day     Continuous Infusions:    propofol Stopped (09/08/21 1415)    niCARdipine      sodium chloride      sodium chloride 125 mL/hr at 09/08/21 0400     PRN Meds: sodium chloride flush, sodium chloride, ondansetron **OR** ondansetron, polyethylene glycol, acetaminophen **OR** acetaminophen, gabapentin, morphine    Past History    Past Medical History:   has a past medical history of Hypertension. Social History:   reports that he quit smoking about 51 years ago. His smoking use included cigarettes. He has a 10.00 pack-year smoking history.  He has never used smokeless tobacco. He reports that he does not drink alcohol and does not use drugs. Family History:   Family History   Problem Relation Age of Onset    High Blood Pressure Mother     No Known Problems Father        Physical Examination      Vitals:  BP (!) 185/99   Pulse 82   Temp 98.8 °F (37.1 °C) (Oral)   Resp 16   Ht 5' 7\" (1.702 m)   Wt 207 lb 7.3 oz (94.1 kg)   SpO2 99%   BMI 32.49 kg/m²   Temp (24hrs), Av.6 °F (36.4 °C), Min:97 °F (36.1 °C), Max:98.8 °F (37.1 °C)      I/O (24Hr): Intake/Output Summary (Last 24 hours) at 2021 1628  Last data filed at 2021 1426  Gross per 24 hour   Intake 2769.09 ml   Output 950 ml   Net 1819.09 ml       Physical Exam  Constitutional:       Appearance: He is ill-appearing and toxic-appearing. HENT:      Right Ear: External ear normal.      Left Ear: External ear normal.      Nose: Nose normal.      Mouth/Throat:      Comments: Intubated  Eyes:      Pupils: Pupils are equal, round, and reactive to light. Cardiovascular:      Rate and Rhythm: Normal rate and regular rhythm. Pulmonary:      Comments: Intubated  Abdominal:      General: Bowel sounds are normal.      Palpations: Abdomen is soft. Musculoskeletal:         General: No deformity or signs of injury. Cervical back: Rigidity present. Skin:     General: Skin is warm. Findings: Lesion (vesicular lesions in various states of healing in R V1 distribution of face) present. Neurological:      Deep Tendon Reflexes:      Reflex Scores:       Bicep reflexes are 2+ on the right side and 2+ on the left side. Brachioradialis reflexes are 2+ on the right side and 2+ on the left side. Patellar reflexes are 2+ on the right side and 2+ on the left side. Psychiatric:      Comments: Sedated, intubated       Neurologic Exam     Mental Status   Sedated, intubated     Cranial Nerves     CN III, IV, VI   Pupils are equal, round, and reactive to light.   Sedated, intubated     Motor Exam   Muscle bulk: decreased  Overall muscle tone: normalUnable to obtain additional pt sedated, intubated     Sensory Exam   Unable to obtain     Gait, Coordination, and Reflexes     Reflexes   Right brachioradialis: 2+  Left brachioradialis: 2+  Right biceps: 2+  Left biceps: 2+  Right patellar: 2+  Left patellar: 2+       Labs/Imaging/Diagnostics   Labs:  CBC:  Recent Labs     09/06/21 1219 09/07/21  0356 09/08/21  0906   WBC 6.7 7.8 6.4   RBC 5.18 4.84 4.30*   HGB 15.3 14.0 12.3*   HCT 45.9 43.7 39.0*   MCV 88.6 90.3 90.7    193 156     CHEMISTRIES:  Recent Labs     09/06/21 1219 09/07/21  0356 09/08/21  0906   * 136 136   K 3.9 4.2 4.5   CL 97* 99 102   CO2 23 25 19*   BUN 13 23* 40*   CREATININE 0.7 2.3* 5.1*   GLUCOSE 109* 110* 99     PT/INR:No results for input(s): PROTIME, INR in the last 72 hours. APTT:No results for input(s): APTT in the last 72 hours. LIVER PROFILE:  Recent Labs     09/07/21 0356   AST 17   ALT 10*   BILITOT 0.8   ALKPHOS 79       Imaging Last 24 Hours:  CT HEAD WO CONTRAST    Result Date: 9/7/2021  Exam: CT brain without contrast Comparison: None Clinical history: Acute mental status change Findings: The ventricles are normal in size and position. No intracranial masses or midline shift identified. No abnormal extra-axial fluid collections are seen. No acute intracranial hemorrhage. The visualized paranasal sinuses are clear. No skull fracture identified. Nonspecific tissue swelling at the right forehead and in the right malar region. Impression: 1. No acute intracranial hemorrhage or process identified. 2. Nonspecific tissue swelling at the right forehead and in the right malar region. This document has been electronically signed by: Gina Paez MD on 09/07/2021 11:02 PM All CTs at this facility use dose modulation techniques and iterative reconstructions, and/or weight-based dosing when appropriate to reduce radiation to a low as reasonably achievable.     US RENAL COMPLETE    Result Date: 9/8/2021  BILATERAL RENAL ULTRASOUND: CLINICAL INFORMATION: Renal failure COMPARISON: No comparison available. TECHNIQUE: Multiple sonographic images of both kidneys were obtained. Images of the urinary bladder were also obtained. FINDINGS: RIGHT KIDNEY - 13.0 x 6.6 x 6.0 cm Resistive Index - 0.63 Cortical Thickness - 1.2 cm LEFT KIDNEY - 11.5 x 5.1 x 5.4 cm Resistive Index - 0.62 Cortical Thickness - 1.2 cm URINARY BLADDER catheter  KIDNEYS:  Both kidneys are normal in size and contour. The resistive indices are normal.  MASS/CYST: No solid or cystic lesion of either kidney is seen. HYDRONEPHROSIS:  There is no hydronephrosis. CALCULI:  No calculi are seen. BLADDER:  Urinary bladder empty with Hampton catheter in place. Moderate amount of sludge in the gallbladder. .     Normal renal ultrasound **This report has been created using voice recognition software. It may contain minor errors which are inherent in voice recognition technology. ** Final report electronically signed by Dr. Theodora Schilling on 9/8/2021 4:08 PM    XR CHEST PORTABLE    Result Date: 9/8/2021  PROCEDURE: XR CHEST PORTABLE CLINICAL INFORMATION: 21-year-old male who underwent endotracheal and nasogastric tube placement. COMPARISON: Chest x-ray 09/08/2021. TECHNIQUE: 3 AP semiupright views of the chest were obtained. FINDINGS: There is a nasogastric tube coursing along the route of the esophagus and terminating in the stomach. There is a nasogastric tube approximately 6.5 cm from the stan. The cardiac silhouette and pulmonary vasculature are within normal limits. There is no significant pleural effusion or pneumothorax. Visualized portions of the upper abdomen are within normal limits. The osseous structures are intact. No acute fractures or suspicious osseous lesions. Interval placement of an endotracheal and nasogastric tube. Otherwise stable appearance of the chest when compared to the prior study.  **This report has been created using voice recognition software. It may contain minor errors which are inherent in voice recognition technology. ** Final report electronically signed by Dr Fidelia Wintesr on 9/8/2021 4:21 PM    XR CHEST PORTABLE    Result Date: 9/8/2021  PROCEDURE: XR CHEST PORTABLE CLINICAL INFORMATION: rhonci and obtunded. COMPARISON: No prior study. TECHNIQUE: AP upright view of the chest. FINDINGS: The heart size is at the upper limits of normal.The mediastinum is not widened. There are no pulmonary infiltrates or effusions. The pulmonary vascularity is normal. No suspicious osseous lesions are present. No acute cardiopulmonary process. **This report has been created using voice recognition software. It may contain minor errors which are inherent in voice recognition technology. ** Final report electronically signed by Dr. Teddy Aguilar on 9/8/2021 7:56 AM    MRI BRAIN WO CONTRAST    Result Date: 9/8/2021  PROCEDURE: MRI BRAIN WO CONTRAST CLINICAL INFORMATION HCV. Unresponsive. HSV on the right forehead. Assess for HSV encephalitis. COMPARISON: Head CT 9/7/2021. TECHNIQUE: Multiplanar and multiple spin echo MRI images were obtained of the brain without contrast. FINDINGS: The diffusion-weighted images are normal.  The brain volume is mildly reduced. There is a mild amount of signal hyperintensity on the FLAIR and T2-weighted sequences in the white matter of the brain. This is consistent with mild severity chronic small vessel ischemic changes. There is no abnormal signal in the temporal lobes. There is a small area of old infarct or volume loss in the periphery of the left cerebellum. There are no intra-or extra-axial collections. There is no hydrocephalus, midline shift or mass effect. There is mineralization in the medial aspects of the basal ganglia bilaterally. The major intracranial vascular flow voids are present.  The midline craniocervical junction structures are normal.  The pituitary gland and brainstem are normal. There is some fullness of the nasal mucosa. There is some trace fluid in the left mastoid air cells. 1. No acute findings. 2. Mild severity chronic small vessel ischemic changes. **This report has been created using voice recognition software. It may contain minor errors which are inherent in voice recognition technology. ** Final report electronically signed by Dr. Reid Salazar on 9/8/2021 11:48 AM        Assessment and Plan:        Hospital Problems         Last Modified POA    Herpes zoster ophthalmicus 9/6/2021 Yes          1. Acute Encephalopathy 2/2 Herpes Zoster Virus, Encephalitis vs Meningitis  -LP performed today with +Varicella-Zoster  -ID following  -On IV Acyclovir per primary/ID    This patient was seen with Dr. Nicolas Gallego who agrees with assessment and plan.      Electronically signed by Priscila Curry PA-C on 9/8/21 at 4:28 PM EDT

## 2021-09-08 NOTE — CONSULTS
CRITICAL CARE PROGRESS NOTE      Patient:  Claribel Funez    Unit/Bed:4D-17/017-A  YOB: 1940  MRN: [id number]   PCP: John Yu  Date of Admission: 9/6/2021  Chief Complaint:-Right eye pain and swelling    Assessment and Plan:    Acute Hypoxic Respiratory Failure: Secondary to Infection. Patient also was very altered and concern for protecting his own airway. Currently requiring minimal pressure support and peep. Lung protective strategies by keeping Peak pressure <35 and Plateau Pressure <64. Altered Mental Status: Etiology unclear at this time however could be secondary HSV causing HSV encephalitis, hypotension, or other causes infectious encephlopathy. CT of the head showed no acute intracranial hemorrhage or process in the brain. MRI of the brain shows no acute findings and mild severity chronic small vessel ischemic changes. Varicella-Zoster ophthalmicus: Rash is on the right side of the face/head in the V1 distribution. No history of VZV vaccine. Currently receiving acyclovir 10 mics per kilo grams IV. Patient saw ophthalmology and recommends prednisone eyedrops every 6 hours until next appointment 9/20. Acute renal failure: Unclear etiology at this time however could be secondary to hypotension. Developed DEANDRE on 9/7 and patient was started on normal saline 125 mL/h continuous. All nephrotoxic agents are held. Patient is still making urine. Obtain renal ultrasound, urine lites, micro albumin creatinine ratio, and microscopic urinalysis. Avoid further hypotension. Renally dosed meds. May Need Dialysis   Hypotension:  Secondary to infection. Lisinopril on hold due to DEANDRE  BPH: hold Flomax due to Hypotension  HTN:  Currently hypotensive; lisinopril held secondary to DEANDRE    INITIAL H AND P AND ICU COURSE:  The patient is a 50-year-old male with a past medical history of hypertension and BPH who presented to [facility] ED on 9/6 for complaints of right eye swelling and pain.   Patient states this has been going on for 2 to 3 days. He tried Motrin and applied a wet damp cloth on the eye but found mild relief only. He did report some associated photophobia, and presentation of a small rash on the right orbital forehead. The rash continued to worsen, and was associated with itching. The patient initially came to the ED on 9/5 and was found to have corneal abrasions with bacterial conjunctivitis. He was given gentamicin ophthalmic drops, told to follow-up outpatient with Dr. Aide Pa but return to the ED if symptoms worsened. 9/6 the patient came back to the ED complaining of worsening pain, swelling and unable to open his eye. He was started on IV acyclovir for concerns of herpes zoster's ophthalmicus. Patient was admitted for further evaluation and management. Per chart review patient was noted to be hypotensive with SBP's in the low 90s on 9/7. Patient at that time was hemodynamically stable and nannette overnight patient's mental status was noted to be altered. Was only oriented to person. At that time CT of the head showed no acute changes. Labs were normal with normal glucose, ammonia, lactic acid. CODE BLUE was called on 9/8 and decision was made to intubate patient due to patient being altered and hypoxia. Patient was transferred to ICU for further management. please see assessment and plan for further details    Past Medical History: As in HPI  Family History:  None found in chart  Social History: Patient was a former smoker and smoked 1 pack/day for 10 years. Quit in [address] [place] [address] Road to assess due to patient being sedated on ventilator.     Scheduled Meds:   fentaNYL        [START ON 9/9/2021] acyclovir  10 mg/kg (Ideal) IntraVENous Q24H    prednisoLONE acetate  1 drop Right Eye 4 times per day    [Held by provider] QUEtiapine  25 mg Oral BID    [Held by provider] baclofen  20 mg Oral TID    calcium elemental  500 mg Oral BID    [Held by provider] lisinopril  20 mg Oral Daily [Held by provider] magnesium oxide  400 mg Oral 4x Daily AC & HS    vitamin B-6  200 mg Oral Daily    [Held by provider] tamsulosin  0.4 mg Oral Daily    Vitamin D  1,000 Units Oral BID AC    zinc sulfate  50 mg Oral Daily    sodium chloride flush  5-40 mL IntraVENous 2 times per day     Continuous Infusions:   norepinephrine Stopped (09/08/21 1003)    propofol      sodium chloride      sodium chloride 125 mL/hr at 09/08/21 0400       PHYSICAL EXAMINATION:  T:  97.9  P:  74. RR:  16. B/P:  94/63. FiO2:  30. O2 Sat:  99.  I/O:  +550  Body mass index is 32.49 kg/m². GCS:   6  PC: 12/6: TV: 353: RRTotal: 12: Ti:1 sec:   General:   6  HEENT:  normocephalic and atraumatic. No scleral icterus. PERR  Neck: supple. No Thyromegaly. Lungs: clear to auscultation. No retractions  Cardiac: RRR. No JVD. Abdomen: soft. Nontender. Extremities:  No clubbing, cyanosis, or edema x 4. Vasculature: capillary refill < 3 seconds. Palpable dorsalis pedis pulses. Skin:  warm and dry. Psych:  Alert and oriented x3. Affect appropriate  Lymph:  No supraclavicular adenopathy. Neurologic:  No focal deficit. No seizures. Data: (All radiographs, tracings, PFTs, and imaging are personally viewed and interpreted unless otherwise noted). Sodium 136, potassium 4.5, chloride 102, CO2 19, BUN 40, creatinine 5.1, anion gap 15, GFR 11, glucose 99, calcium 8.3, procalcitonin 0.806.4,   hemoglobin 12.3, MCV 90.7, platelet count 264  WBC 6.4   Telemetry shows normal sinus rhythm  MRI brain without contrast shows no acute findings and mild severity chronic small vessel ischemic changes   Chest x-ray done on 9/8 shows no acute cardiopulmonary process. CT head without contrast on 9/8 shows no acute intracranial hemorrhage or President fine.   Nonspecific tissue swelling of the right forehead and in the right malar region  Blood cultures done 9/7 showed no growth        Seen with multidisciplinary ICU team.  Meets Continued ICU Level Care Criteria:    [x] Yes   [] No - Transfer Planned to listed location:  [] HOSPITALIST CONTACTED-      Case and plan discussed with Dr. Ivy Lee. Electronically signed by Tristian Roe DO  CRITICAL CARE SPECIALIST  Patient seen by me. Case discussed with resident physician. Critically ill requiring mechanical ventilator support. Now with HSV associated meningitis/encephalitis. On IV acyclovir. Appreciate infectious disease assistance. Italicized font represents changes to the note made by me. CC time 35 minutes. Time was discontiguous. Time does not include procedures. Time does include my direct assessment of the patient and coordination of care.   Electronically signed by Claudia Mccabe MD on 9/8/2021 at 5:40 PM

## 2021-09-08 NOTE — FLOWSHEET NOTE
09/08/21 0851   Emergency Contacts   Contact 1: Name Mabel Olsen   Contact 1: Relationship wife     Patient's wife Sailaja Osorio updated on patient's condition.

## 2021-09-09 ENCOUNTER — APPOINTMENT (OUTPATIENT)
Dept: GENERAL RADIOLOGY | Age: 81
DRG: 124 | End: 2021-09-09
Payer: MEDICARE

## 2021-09-09 LAB
ANION GAP SERPL CALCULATED.3IONS-SCNC: 12 MEQ/L (ref 8–16)
BASOPHILS # BLD: 0.2 %
BASOPHILS ABSOLUTE: 0 THOU/MM3 (ref 0–0.1)
BUN BLDV-MCNC: 41 MG/DL (ref 7–22)
CALCIUM IONIZED: 1.06 MMOL/L (ref 1.12–1.32)
CALCIUM SERPL-MCNC: 8.1 MG/DL (ref 8.5–10.5)
CHLORIDE BLD-SCNC: 109 MEQ/L (ref 98–111)
CO2: 19 MEQ/L (ref 23–33)
CREAT SERPL-MCNC: 5.5 MG/DL (ref 0.4–1.2)
EOSINOPHIL # BLD: 1.2 %
EOSINOPHILS ABSOLUTE: 0.1 THOU/MM3 (ref 0–0.4)
ERYTHROCYTE [DISTWIDTH] IN BLOOD BY AUTOMATED COUNT: 14.4 % (ref 11.5–14.5)
ERYTHROCYTE [DISTWIDTH] IN BLOOD BY AUTOMATED COUNT: 48.4 FL (ref 35–45)
GFR SERPL CREATININE-BSD FRML MDRD: 10 ML/MIN/1.73M2
GLUCOSE BLD-MCNC: 106 MG/DL (ref 70–108)
HBV SURFACE AB TITR SER: NEGATIVE {TITER}
HCT VFR BLD CALC: 43.1 % (ref 42–52)
HEMOGLOBIN: 13.6 GM/DL (ref 14–18)
HEPATITIS B CORE IGM ANTIBODY: NEGATIVE
HEPATITIS B SURFACE ANTIGEN: NEGATIVE
IMMATURE GRANS (ABS): 0.04 THOU/MM3 (ref 0–0.07)
IMMATURE GRANULOCYTES: 0.5 %
LACTIC ACID: 1.1 MMOL/L (ref 0.5–2)
LYMPHOCYTES # BLD: 10.7 %
LYMPHOCYTES ABSOLUTE: 0.9 THOU/MM3 (ref 1–4.8)
MAGNESIUM: 2.5 MG/DL (ref 1.6–2.4)
MCH RBC QN AUTO: 28.9 PG (ref 26–33)
MCHC RBC AUTO-ENTMCNC: 31.6 GM/DL (ref 32.2–35.5)
MCV RBC AUTO: 91.7 FL (ref 80–94)
MONOCYTES # BLD: 12.1 %
MONOCYTES ABSOLUTE: 1 THOU/MM3 (ref 0.4–1.3)
MYOGLOBIN: 47 NG/ML (ref 28–72)
NUCLEATED RED BLOOD CELLS: 0 /100 WBC
ORGANISM: ABNORMAL
PHOSPHORUS: 5.6 MG/DL (ref 2.4–4.7)
PLATELET # BLD: 165 THOU/MM3 (ref 130–400)
PMV BLD AUTO: 9.2 FL (ref 9.4–12.4)
POTASSIUM REFLEX MAGNESIUM: 4.7 MEQ/L (ref 3.5–5.2)
PROLACTIN: 26.8 NG/ML
RBC # BLD: 4.7 MILL/MM3 (ref 4.7–6.1)
SEG NEUTROPHILS: 75.3 %
SEGMENTED NEUTROPHILS ABSOLUTE COUNT: 6.4 THOU/MM3 (ref 1.8–7.7)
SODIUM BLD-SCNC: 140 MEQ/L (ref 135–145)
TSH SERPL DL<=0.05 MIU/L-ACNC: 0.79 UIU/ML (ref 0.4–4.2)
URINE CULTURE, ROUTINE: ABNORMAL
WBC # BLD: 8.5 THOU/MM3 (ref 4.8–10.8)

## 2021-09-09 PROCEDURE — 83735 ASSAY OF MAGNESIUM: CPT

## 2021-09-09 PROCEDURE — 6360000002 HC RX W HCPCS: Performed by: INTERNAL MEDICINE

## 2021-09-09 PROCEDURE — 84100 ASSAY OF PHOSPHORUS: CPT

## 2021-09-09 PROCEDURE — 80048 BASIC METABOLIC PNL TOTAL CA: CPT

## 2021-09-09 PROCEDURE — 36556 INSERT NON-TUNNEL CV CATH: CPT | Performed by: NURSE PRACTITIONER

## 2021-09-09 PROCEDURE — 6360000002 HC RX W HCPCS: Performed by: STUDENT IN AN ORGANIZED HEALTH CARE EDUCATION/TRAINING PROGRAM

## 2021-09-09 PROCEDURE — 83605 ASSAY OF LACTIC ACID: CPT

## 2021-09-09 PROCEDURE — 71045 X-RAY EXAM CHEST 1 VIEW: CPT

## 2021-09-09 PROCEDURE — 2500000003 HC RX 250 WO HCPCS: Performed by: NURSE PRACTITIONER

## 2021-09-09 PROCEDURE — 36415 COLL VENOUS BLD VENIPUNCTURE: CPT

## 2021-09-09 PROCEDURE — 99232 SBSQ HOSP IP/OBS MODERATE 35: CPT | Performed by: PHYSICIAN ASSISTANT

## 2021-09-09 PROCEDURE — 6360000002 HC RX W HCPCS: Performed by: PHYSICIAN ASSISTANT

## 2021-09-09 PROCEDURE — 6360000002 HC RX W HCPCS: Performed by: NURSE PRACTITIONER

## 2021-09-09 PROCEDURE — 87340 HEPATITIS B SURFACE AG IA: CPT

## 2021-09-09 PROCEDURE — 02HV33Z INSERTION OF INFUSION DEVICE INTO SUPERIOR VENA CAVA, PERCUTANEOUS APPROACH: ICD-10-PCS | Performed by: NURSE PRACTITIONER

## 2021-09-09 PROCEDURE — 99233 SBSQ HOSP IP/OBS HIGH 50: CPT | Performed by: INTERNAL MEDICINE

## 2021-09-09 PROCEDURE — 6370000000 HC RX 637 (ALT 250 FOR IP): Performed by: FAMILY MEDICINE

## 2021-09-09 PROCEDURE — 95816 EEG AWAKE AND DROWSY: CPT

## 2021-09-09 PROCEDURE — 2580000003 HC RX 258: Performed by: STUDENT IN AN ORGANIZED HEALTH CARE EDUCATION/TRAINING PROGRAM

## 2021-09-09 PROCEDURE — 85025 COMPLETE CBC W/AUTO DIFF WBC: CPT

## 2021-09-09 PROCEDURE — 99291 CRITICAL CARE FIRST HOUR: CPT | Performed by: INTERNAL MEDICINE

## 2021-09-09 PROCEDURE — 84443 ASSAY THYROID STIM HORMONE: CPT

## 2021-09-09 PROCEDURE — 2580000003 HC RX 258: Performed by: INTERNAL MEDICINE

## 2021-09-09 PROCEDURE — 82330 ASSAY OF CALCIUM: CPT

## 2021-09-09 PROCEDURE — 2000000000 HC ICU R&B

## 2021-09-09 PROCEDURE — 84146 ASSAY OF PROLACTIN: CPT

## 2021-09-09 PROCEDURE — 2580000003 HC RX 258: Performed by: FAMILY MEDICINE

## 2021-09-09 PROCEDURE — 94761 N-INVAS EAR/PLS OXIMETRY MLT: CPT

## 2021-09-09 PROCEDURE — 90935 HEMODIALYSIS ONE EVALUATION: CPT

## 2021-09-09 PROCEDURE — 86705 HEP B CORE ANTIBODY IGM: CPT

## 2021-09-09 PROCEDURE — 95822 EEG COMA OR SLEEP ONLY: CPT | Performed by: PSYCHIATRY & NEUROLOGY

## 2021-09-09 PROCEDURE — 6360000002 HC RX W HCPCS

## 2021-09-09 PROCEDURE — 2580000003 HC RX 258: Performed by: PHYSICIAN ASSISTANT

## 2021-09-09 PROCEDURE — 86706 HEP B SURFACE ANTIBODY: CPT

## 2021-09-09 PROCEDURE — 94003 VENT MGMT INPAT SUBQ DAY: CPT

## 2021-09-09 RX ORDER — NOREPINEPHRINE BIT/0.9 % NACL 16MG/250ML
2-100 INFUSION BOTTLE (ML) INTRAVENOUS CONTINUOUS
Status: DISCONTINUED | OUTPATIENT
Start: 2021-09-09 | End: 2021-09-10

## 2021-09-09 RX ORDER — LORAZEPAM 2 MG/ML
2 INJECTION INTRAMUSCULAR ONCE
Status: COMPLETED | OUTPATIENT
Start: 2021-09-09 | End: 2021-09-09

## 2021-09-09 RX ORDER — MIDAZOLAM HYDROCHLORIDE 1 MG/ML
2 INJECTION INTRAMUSCULAR; INTRAVENOUS ONCE
Status: DISCONTINUED | OUTPATIENT
Start: 2021-09-09 | End: 2021-09-14

## 2021-09-09 RX ORDER — SODIUM CHLORIDE 9 MG/ML
INJECTION, SOLUTION INTRAVENOUS ONCE
Status: DISCONTINUED | OUTPATIENT
Start: 2021-09-09 | End: 2021-09-09

## 2021-09-09 RX ORDER — FENTANYL CITRATE 50 UG/ML
INJECTION, SOLUTION INTRAMUSCULAR; INTRAVENOUS
Status: COMPLETED
Start: 2021-09-09 | End: 2021-09-09

## 2021-09-09 RX ORDER — FENTANYL CITRATE 50 UG/ML
50 INJECTION, SOLUTION INTRAMUSCULAR; INTRAVENOUS ONCE
Status: COMPLETED | OUTPATIENT
Start: 2021-09-09 | End: 2021-09-09

## 2021-09-09 RX ADMIN — SODIUM CHLORIDE, PRESERVATIVE FREE 10 ML: 5 INJECTION INTRAVENOUS at 20:24

## 2021-09-09 RX ADMIN — FENTANYL CITRATE 50 MCG: 50 INJECTION, SOLUTION INTRAMUSCULAR; INTRAVENOUS at 15:35

## 2021-09-09 RX ADMIN — FENTANYL CITRATE 50 MCG: 50 INJECTION, SOLUTION INTRAMUSCULAR; INTRAVENOUS at 10:03

## 2021-09-09 RX ADMIN — PREDNISOLONE ACETATE 1 DROP: 10 SUSPENSION/ DROPS OPHTHALMIC at 18:53

## 2021-09-09 RX ADMIN — SODIUM CHLORIDE: 9 INJECTION, SOLUTION INTRAVENOUS at 12:32

## 2021-09-09 RX ADMIN — PROPOFOL 25 MCG/KG/MIN: 10 INJECTION, EMULSION INTRAVENOUS at 09:52

## 2021-09-09 RX ADMIN — Medication 1000 UNITS: at 05:44

## 2021-09-09 RX ADMIN — Medication 1000 UNITS: at 17:13

## 2021-09-09 RX ADMIN — LORAZEPAM 2 MG: 2 INJECTION INTRAMUSCULAR; INTRAVENOUS at 04:32

## 2021-09-09 RX ADMIN — PREDNISOLONE ACETATE 1 DROP: 10 SUSPENSION/ DROPS OPHTHALMIC at 13:08

## 2021-09-09 RX ADMIN — Medication 2 MCG/MIN: at 02:44

## 2021-09-09 RX ADMIN — SODIUM CHLORIDE: 9 INJECTION, SOLUTION INTRAVENOUS at 20:19

## 2021-09-09 RX ADMIN — PREDNISOLONE ACETATE 1 DROP: 10 SUSPENSION/ DROPS OPHTHALMIC at 05:09

## 2021-09-09 RX ADMIN — MORPHINE SULFATE 4 MG: 4 INJECTION, SOLUTION INTRAMUSCULAR; INTRAVENOUS at 20:10

## 2021-09-09 RX ADMIN — ACYCLOVIR SODIUM 650 MG: 50 INJECTION, SOLUTION INTRAVENOUS at 07:19

## 2021-09-09 RX ADMIN — PROPOFOL 25 MCG/KG/MIN: 10 INJECTION, EMULSION INTRAVENOUS at 15:26

## 2021-09-09 RX ADMIN — SODIUM CHLORIDE: 9 INJECTION, SOLUTION INTRAVENOUS at 04:32

## 2021-09-09 RX ADMIN — PIPERACILLIN AND TAZOBACTAM 3375 MG: 3; .375 INJECTION, POWDER, LYOPHILIZED, FOR SOLUTION INTRAVENOUS at 13:09

## 2021-09-09 RX ADMIN — Medication 200 MG: at 08:04

## 2021-09-09 RX ADMIN — Medication 50 MG: at 08:04

## 2021-09-09 RX ADMIN — CALCIUM 500 MG: 500 TABLET ORAL at 08:04

## 2021-09-09 RX ADMIN — LEVETIRACETAM 500 MG: 100 INJECTION, SOLUTION INTRAVENOUS at 17:21

## 2021-09-09 RX ADMIN — CALCIUM 500 MG: 500 TABLET ORAL at 20:25

## 2021-09-09 ASSESSMENT — PAIN SCALES - GENERAL
PAINLEVEL_OUTOF10: 0
PAINLEVEL_OUTOF10: 4
PAINLEVEL_OUTOF10: 5

## 2021-09-09 ASSESSMENT — PULMONARY FUNCTION TESTS
PIF_VALUE: 17
PIF_VALUE: 16
PIF_VALUE: 17
PIF_VALUE: 16

## 2021-09-09 NOTE — CARE COORDINATION
9/9/21, 11:23 AM EDT    [facility]  Location: -[facility]/017-A Reason for admit: Herpes zoster ophthalmicus [B02.30]   Procedure:   9/5 CT head: No acute findings  9/5 CT cervical spine: No acute findings  9/5 CT Facial bones: Right-sided facial swelling without evidence of acute osseous injury of the face  9/7 CT Head: No acute intracranial findings; Nonspecific tissue swelling at the right forehead and in the right malar region  9/8 Code Blue  9/8 Intubated  9/8 MRI Brain: No acute findings  9/8 Renal US: Normal  9/8 LP  9/8 CXR: Interval placement of an endotracheal and nasogastric tube. Otherwise stable appearance of the chest when compared to the prior study  9/9 TESSIO FRANCISCO J    Barriers to Discharge: Code Blue on 4A on 9/8 and was intubated d/t AMS and hypoxia. Transferred to ICU. Neurology consulted for AMS. LP done and EEG ordered. Nephrology consulted for Acute renal failure, may need dialysis. TESSIO placed today and plan for HD for IV acyclovir induced DEANDRE. Plan to hold today's dose of IV acyclovir and possibly restart with lower dose tomorrow. Remains on vent w/ETT on PCV, peep 6, FIO2 30%, sats 97%. Tmax 99.1. NSR. Unable to follow commands; moves BLEs to painful stim, no movement of BUEs to painful stim. Telemetry, OG to vishnu GARSIA, Alfonzo. Intensivist, Neurology, Nephrology, and ID following. PT/OT. CSF +Varicella-Zoster virus. Urine +gram positive cocci. IVF, diprivan @ 25 mcg/kg/min, prn IV morphine, IV zosyn, prednisolone drips right eye, vit D, zinc. Received 500 ml fld bolus x1 yesterday. CO2 19, BUN 41, Creat 5.5, Mg 2.5, phos 5.6, hgb 13.6. PCP: LUIS HERNANDEZ  Readmission Risk Score: 22%  Patient Goals/Plan/Treatment Preferences: Home w/wife and resume [facility] for therapies. Has walker. SW on case.

## 2021-09-09 NOTE — PROCEDURES
[address] [place] [address]                          ELECTROENCEPHALOGRAM REPORT    PATIENT NAME: Gabriella Spann                      :        1940  MED REC NO:   [id number]                           ROOM:       0017  ACCOUNT NO:   [de-identified]                           ADMIT DATE: 2021  PROVIDER:     Mae Carter. Indira Almodovar MD    DATE OF EE2021    REFERRING PROVIDER: Hank BARNHART     CLINICAL HISTORY:  This is an 45-year-old male presenting with herpes  ophthalmicus, increased encephalopathy. The patient has positive  varicella encephalitis. The patient is complaining of painful facial  lesions, facial swelling, photophobia developed on 2021. The  patient is on 14 mcg of propofol. Other medications listed are  midazolam, piperacillin and tazobactam, acyclovir, prednisone,  quetiapine, baclofen, lisinopril, magnesium oxide, vitamin B6,  tamsulosin, vitamin D, zinc sulfate, norepinephrine, nicardipine. CLINICAL INTERPRETATION:  This is a 17-channel EEG performed without  sleep deprivation. Hyperventilation was not performed. Photic  stimulation was not performed. The patient is described as sedated on  the ventilator. The background rhythm activity is noted to be slowed and poorly  organized. Periodic lateral discharges are observed frequently during  the recording that may indicate seizure tendency. Polymorphic delta  slowing is observed suggestive of a paradoxical cortical dysfunction, or  indicate seizure tendency. No clinical seizures were observed. Hyperventilation and photic stimulation were not performed. IMPRESSION:  This is an abnormal EEG due to the presence of periodic  lateralized discharges that may be epileptogenic in nature, or indicate  seizure tendency in the proper clinical context.   In addition, excessive  slowing was seen in the delta range suggestive of a cortical dysfunction  such as seen with encephalopathy. Clinical correlation is recommended. No clinical seizures were observed. The patient is noted to be sedated  throughout the study.         Cherise Monk MD    D: 09/09/2021 18:04:55       T: 09/09/2021 18:07:28     [id number]  Job#: [id number]     Doc#: [id number]    CC:

## 2021-09-09 NOTE — FLOWSHEET NOTE
5414 Dr Enrique Ramos in room preparing for dialysis catheter placement. Consent obtained from family by Dr Enrique Ramos. Propofol drip started at 25 mcg per Dr Enrique Ramos verbal order. 1111 Chest xray done, placement verified at bedside by Dr Enrique Ramos and Maude Briseno CNP. Inpt dialysis notified of placement. Family in room at bedside, updated. 1130 Dialysis nurse in room setting up for treatment. Difficulty with dialysis catheter drawing very sluggish. Stated would be unable to do treatment because access pressure to high. [address] left with A Abbe CNP informing of catheter unable to flow freely for dialysis.    1230 EEG in room at this time  1310 EEG complete

## 2021-09-09 NOTE — PROGRESS NOTES
CRITICAL CARE PROGRESS NOTE      Patient:  Maria R Nguyen    Unit/Bed:4D-17/017-A  YOB: 1940  MRN: [id number]   PCP: Chantel Trinidad  Date of Admission: 9/6/2021  Chief Complaint:-Right eye pain and swelling    Assessment and Plan:    Acute Hypoxic Respiratory Failure: Secondary to Infection. Patient also was very altered and concern for protecting his own airway. Currently requiring minimal pressure support and peep. Lung protective strategies by keeping Peak pressure <35 and Plateau Pressure <76. 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 Acyclovir induced Neuropathy is also in differential. CT of the head showed no acute intracranial hemorrhage or process in the brain. MRI of the brain shows no acute findings and mild severity chronic small vessel ischemic changes. LP performed on 9/8 positive for +Varicella-Zoster. Will hold Acyclovir today. Varicella-Zoster ophthalmicus: Rash is on the right side of the face/head in the V1 distribution. No history of VZV vaccine. Patient saw ophthalmology and recommends prednisone eyedrops every 6 hours until next appointment 9/20. Acyclovir on hold due to DEANDRE and Acute Encephalopathy. Acute renal failure: Worsening. Secondary to Acyclovir. Developed DEANDRE on 9/7 and patient was started on normal saline 125 mL/h continuous. All nephrotoxic agents are held. Patient is still making urine. Avoid further hypotension. Renally dosed meds. Will Start dialysis. Hemodialysis Catheter placed on 9/9  ? Seizure: B/l upper limb twitching noted overnight with Ativan given. No hx of seizures. Neurology notified. Will obtain EEG. Keppra for Seizure prophylaxis. Hypotension:  Secondary to infection.  Lisinopril on hold due to DEANDRE  BPH: hold Flomax due to Hypotension  HTN: lisinopril held secondary to DEANDRE    INITIAL H AND P AND ICU COURSE:  The patient is a 80-year-old male with a past medical history of hypertension and BPH who presented to [facility] ED on 9/6 for complaints of right eye swelling and pain. Patient states this has been going on for 2 to 3 days. He tried Motrin and applied a wet damp cloth on the eye but found mild relief only. He did report some associated photophobia, and presentation of a small rash on the right orbital forehead. The rash continued to worsen, and was associated with itching. The patient initially came to the ED on 9/5 and was found to have corneal abrasions with bacterial conjunctivitis. He was given gentamicin ophthalmic drops, told to follow-up outpatient with Dr. Aki Rondon but return to the ED if symptoms worsened. 9/6 the patient came back to the ED complaining of worsening pain, swelling and unable to open his eye. He was started on IV acyclovir for concerns of herpes zoster's ophthalmicus. Patient was admitted for further evaluation and management. Per chart review patient was noted to be hypotensive with SBP's in the low 90s on 9/7. Patient at that time was hemodynamically stable and nannette overnight patient's mental status was noted to be altered. Was only oriented to person. At that time CT of the head showed no acute changes. Labs were normal with normal glucose, ammonia, lactic acid. CODE BLUE was called on 9/8 and decision was made to intubate patient due to patient being altered and hypoxia. Patient was transferred to ICU for further management. please see assessment and plan for further details    Past Medical History: As in HPI  Family History:  None found in chart  Social History: Patient was a former smoker and smoked 1 pack/day for 10 years. Quit in [address] [place] [address] Road to assess due to patient being sedated on ventilator.     Scheduled Meds:   midazolam  2 mg IntraVENous Once    piperacillin-tazobactam  3,375 mg IntraVENous Q12H    levetiracetam  500 mg IntraVENous Q12H    [Held by provider] acyclovir  10 mg/kg (Ideal) IntraVENous Q24H    prednisoLONE acetate  1 drop Right Eye 4 times per day    [facility] by provider] QUEtiapine  25 mg Oral BID    [Held by provider] baclofen  20 mg Oral TID    calcium elemental  500 mg Oral BID    [Held by provider] lisinopril  20 mg Oral Daily    [Held by provider] magnesium oxide  400 mg Oral 4x Daily AC & HS    vitamin B-6  200 mg Oral Daily    [Held by provider] tamsulosin  0.4 mg Oral Daily    Vitamin D  1,000 Units Oral BID AC    zinc sulfate  50 mg Oral Daily    sodium chloride flush  5-40 mL IntraVENous 2 times per day     Continuous Infusions:   norepinephrine Stopped (09/09/21 1710)    propofol 10 mcg/kg/min (09/09/21 1709)    niCARdipine Stopped (09/08/21 2040)    sodium chloride      sodium chloride 150 mL/hr at 09/09/21 1232       PHYSICAL EXAMINATION:  T:  98.1  P:  88. RR:  13 B/P:  110/73. FiO2:  30. O2 Sat:  99.  I/O:  -500  Body mass index is 33.46 kg/m². GCS:   6  PC: 12/6: TV: 353: RRTotal: 12: Ti:1 sec:   General:  Patient is acutely ill appearing white male  HEENT:  normocephalic and atraumatic. No scleral icterus. R sided Periorbital vesicular rash along V1 distribution  Neck: supple. No Thyromegaly. Lungs:  Orally intubated; decreased respiratory effort  Cardiac: RRR. No JVD. Abdomen: soft. Nontender. Extremities:  No clubbing, cyanosis, or edema x 4. Vasculature: capillary refill < 3 seconds. Palpable dorsalis pedis pulses. Skin:  warm and dry. Psych:  Unable to assess due to encephalopathic  Lymph:  No supraclavicular adenopathy. Neurologic:  No focal deficit. No seizures. Data: (All radiographs, tracings, PFTs, and imaging are personally viewed and interpreted unless otherwise noted).    Sodium 140, potassium 4.7, chloride 109, CO2 19, BUN 41, 5, anion gap 12, GFR 10, magnesium 2.5, lactic acid 1.1, glucose 106, calcium 9.1, phosphorus 5.6  Hemoglobin 13.6, blood 30.5, MCV 91.7 platelet count 416  Telemetry shows normal sinus rhythm  MRI brain without contrast shows no acute findings and mild severity chronic small vessel ischemic changes Chest x-ray done on 9/8 shows no acute cardiopulmonary process. CT head without contrast on 9/8 shows no acute intracranial hemorrhage or President fine. Nonspecific tissue swelling of the right forehead and in the right malar region  Blood cultures done 9/7 showed no growth   EEG done on 9/9- Read Pending         Seen with multidisciplinary ICU team.  Meets Continued ICU Level Care Criteria:    [x] Yes   [] No - Transfer Planned to listed location:  [] HOSPITALIST CONTACTED-      Case and plan discussed with Dr. Lisbeth Harper. Electronically signed by Angelic Barragan DO  CRITICAL CARE SPECIALIST  Patient seen by me. Case discussed with resident physician. Case discussed with Dr. James Ramos. Appreciate infectious disease assistance. Coordinating dialysis with acyclovir therapy. Mechanically ventilator dependent. Critically ill. Italicized font represents changes to the note made by me. CC time 35 minutes. Time was discontiguous. Time does not include procedures. Time does include my direct assessment of the patient and coordination of care.   Electronically signed by Vera Cordova MD on 9/9/2021 at 6:17 PM

## 2021-09-09 NOTE — PROGRESS NOTES
[facility] 61  PHYSICAL THERAPY MISSED TREATMENT NOTE  [facility] ICU 4D    Date: 2021  Patient Name: Jeanna Rivas        MRN: [id number]   : 1940  ([de-identified] y.o.)  Gender: male                REASON FOR MISSED TREATMENT:  Missed Treat. Code blue was called for pt , will discontinue order at this time and eval when pt medically stable and appropriate.     Rajendra Horn PT, DPT

## 2021-09-09 NOTE — PROGRESS NOTES
[facility]   Nephrology progress note  9/9/2021, 10:55 AM      Pt Name:    Susan Teague  MRN:     [id number]     YOB: 1940  Admit Date:    9/6/2021 11:26 AM  Primary Care Physician:  Mark Mg   Room number  4D-17/017-A    Chief Complaint: Nephrology following for DEANDRE    Subjective:  Patient seen and examined  On 30% FiO2  No normal saline  Not communicating  Not awake or responsive  Not on any sedation      Objective:  24HR INTAKE/OUTPUT:    Intake/Output Summary (Last 24 hours) at 9/9/2021 1055  Last data filed at 9/9/2021 0400  Gross per 24 hour   Intake 3887.51 ml   Output 4200 ml   Net -312.49 ml     I/O last 3 completed shifts: In: 3887.5 [I.V.:3887.5]  Out: 4200 [Urine:4200]  No intake/output data recorded. Admission weight: 210 lb (95.3 kg)  Wt Readings from Last 3 Encounters:   09/09/21 213 lb 10 oz (96.9 kg)   09/05/21 210 lb (95.3 kg)   03/19/19 207 lb 3.2 oz (94 kg)     Body mass index is 33.46 kg/m².     Physical examination  VITALS:     Vitals:    09/09/21 0800 09/09/21 0805 09/09/21 0815 09/09/21 0900   BP: 90/60  (!) 81/58 118/75   Pulse: 55  53 57   Resp: 14 13 13 13   Temp:       TempSrc:       SpO2: 96% 97% 97% 99%   Weight:       Height:         General Appearance: Patient is intubated on mechanical ventilator  Unresponsive, not on any sedation at this time  Mouth/Throat: ET tube present  Neck: No JVD noted  Lungs: Air entry diminished, no rales or rhonchi  Heart:  S1, S2 heard  GI: soft, non-tender, no guarding  Extremities: No significant pitting LE edema      Lab Data  CBC:   Recent Labs     09/07/21  0356 09/08/21  0906 09/09/21  0545   WBC 7.8 6.4 8.5   HGB 14.0 12.3* 13.6*   HCT 43.7 39.0* 43.1    156 165     BMP:  Recent Labs     09/08/21  0906 09/08/21  2159 09/09/21  0545    138 140   K 4.5 4.2 4.7    105 109   CO2 19* 21* 19*   BUN 40* 42* 41*   CREATININE 5.1* 5.2* 5.5*   GLUCOSE 99 100 106   CALCIUM 8.3* 8.4* 8.1*   MG  -- --  2.5*   PHOS  --   --  5.6*     Hepatic:   Recent Labs     09/07/21  0356   LABALBU 3.7   AST 17   ALT 10*   BILITOT 0.8   ALKPHOS 79         Meds:  Infusion:    norepinephrine Stopped (09/09/21 0719)    propofol 25 mcg/kg/min (09/09/21 0952)    niCARdipine Stopped (09/08/21 2040)    sodium chloride      sodium chloride 200 mL/hr at 09/09/21 0432     Meds:    midazolam  2 mg IntraVENous Once    [Held by provider] acyclovir  10 mg/kg (Ideal) IntraVENous Q24H    prednisoLONE acetate  1 drop Right Eye 4 times per day    [Held by provider] QUEtiapine  25 mg Oral BID    [Held by provider] baclofen  20 mg Oral TID    calcium elemental  500 mg Oral BID    [Held by provider] lisinopril  20 mg Oral Daily    [Held by provider] magnesium oxide  400 mg Oral 4x Daily AC & HS    vitamin B-6  200 mg Oral Daily    [Held by provider] tamsulosin  0.4 mg Oral Daily    Vitamin D  1,000 Units Oral BID AC    zinc sulfate  50 mg Oral Daily    sodium chloride flush  5-40 mL IntraVENous 2 times per day     Meds prn: sodium chloride flush, sodium chloride, ondansetron **OR** ondansetron, polyethylene glycol, acetaminophen **OR** acetaminophen, gabapentin, morphine       Impression and Plan:  1. DEANDRE: Nonoliguric, likely ATN  Serum creatinine slowly rising  We will continue with IV fluids  Can give loop diuretics for forced diuresis if urine output drops  High concern for crystal induced/tubular injury from possible nephrotoxicity from acyclovir    2. Altered mental status. Patient is unresponsive. He is not on any sedation at this time. Patient is not waking up at this time. Need to consider possible acyclovir neurotoxicity specially in setting of rising serum creatinine. I feel it is in the best interest to give a trial of dialytic therapy for extracorporeal removal of acyclovir. Discussed with ICU. Discussed with ID. Will hold acyclovir for today. Will resume acyclovir tomorrow.     3.  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 Herpes zoster ophthalmicus  5. Mild metabolic acidosis  6. Hypertension  7.   History of BPH    D/W ICU  Discussed with infectious disease    Gavino Rome MD  [facility]

## 2021-09-09 NOTE — CARE COORDINATION
9/9/21, 7:14 AM EDT    DISCHARGE BARRIERS        Patient transferred to [facility]. Report given to unit Hilaria Junior, regarding discharge plan for this patient.

## 2021-09-09 NOTE — PROGRESS NOTES
Neurology Progress Note    Date:9/9/2021       SANDEEP:2I-89/225-D  Patient Name:Miah Blank     YOB: 1940     Age:81 y.o.    CC:   Chief Complaint   Patient presents with    Eye Pain        Subjective      Vanessa Donald is an [de-identified] M with PMH of HTN who presented to [facility] with painful lesions, facial swelling since and R eye drainage and photophobia which began to develop on 9/4/21. History of shingles unknown, no hx of shingles immunization. No additional history available due to the condition of the patient. 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 His acyclovir is currently being held due to concerns regarding his kidney function. Overnight, his nurse noted some myoclonic jerking in his upper extremities which went on to affect his lower extremities. He is not currently on any antiepileptic medications but was on propofol and Versed at the time.       Review of Systems   Review of Systems   Unable to perform ROS: Intubated       Medications   Scheduled Meds:    midazolam  2 mg IntraVENous Once    [Held by provider] acyclovir  10 mg/kg (Ideal) IntraVENous Q24H    prednisoLONE acetate  1 drop Right Eye 4 times per day    [Held by provider] QUEtiapine  25 mg Oral BID    [Held by provider] baclofen  20 mg Oral TID    calcium elemental  500 mg Oral BID    [Held by provider] lisinopril  20 mg Oral Daily    [Held by provider] magnesium oxide  400 mg Oral 4x Daily AC & HS    vitamin B-6  200 mg Oral Daily    [Held by provider] tamsulosin  0.4 mg Oral Daily    Vitamin D  1,000 Units Oral BID AC    zinc sulfate  50 mg Oral Daily    sodium chloride flush  5-40 mL IntraVENous 2 times per day     Continuous Infusions:    norepinephrine Stopped (09/09/21 0719)    propofol Stopped (09/08/21 1415)    niCARdipine Stopped (09/08/21 2040)    sodium chloride      sodium chloride 200 mL/hr at 21 0432     PRN Meds: sodium chloride flush, sodium chloride, ondansetron **OR** ondansetron, polyethylene glycol, acetaminophen **OR** acetaminophen, gabapentin, morphine    Past History    Past Medical History:   has a past medical history of Hypertension. Social History:   reports that he quit smoking about 51 years ago. His smoking use included cigarettes. He has a 10.00 pack-year smoking history. He has never used smokeless tobacco. He reports that he does not drink alcohol and does not use drugs. Family History:   Family History   Problem Relation Age of Onset    High Blood Pressure Mother     No Known Problems Father        Physical Examination      Vitals:  BP (!) 81/58   Pulse 53   Temp 98.7 °F (37.1 °C) (Oral)   Resp 13   Ht 5' 7\" (1.702 m)   Wt 213 lb 10 oz (96.9 kg)   SpO2 97%   BMI 33.46 kg/m²   Temp (24hrs), Av.6 °F (37 °C), Min:97 °F (36.1 °C), Max:99.1 °F (37.3 °C)      I/O (24Hr): Intake/Output Summary (Last 24 hours) at 2021 0839  Last data filed at 2021 0400  Gross per 24 hour   Intake 3887.51 ml   Output 4200 ml   Net -312.49 ml       Physical Exam  Vitals reviewed. Constitutional:       Appearance: He is ill-appearing. HENT:      Head: Normocephalic and atraumatic. Right Ear: External ear normal.      Left Ear: External ear normal.      Mouth/Throat:      Mouth: Mucous membranes are moist.   Eyes:      Pupils: Pupils are equal, round, and reactive to light. Cardiovascular:      Rate and Rhythm: Normal rate and regular rhythm. Heart sounds: Normal heart sounds. No murmur heard. Pulmonary:      Breath sounds: Normal breath sounds. No wheezing. Skin:     Findings: Rash present. Comments: Vesicular rash in right V1 distribution   Neurological:      Deep Tendon Reflexes:      Reflex Scores:       Patellar reflexes are 1+ on the right side and 1+ on the left side.        Achilles reflexes are 1+ on the right side and 1+ on the right malar region. Impression: 1. No acute intracranial hemorrhage or process identified. 2. Nonspecific tissue swelling at the right forehead and in the right malar region. This document has been electronically signed by: Peg Hassan MD on 09/07/2021 11:02 PM All CTs at this facility use dose modulation techniques and iterative reconstructions, and/or weight-based dosing when appropriate to reduce radiation to a low as reasonably achievable. US RENAL COMPLETE    Result Date: 9/8/2021  BILATERAL RENAL ULTRASOUND: CLINICAL INFORMATION: Renal failure COMPARISON: No comparison available. TECHNIQUE: Multiple sonographic images of both kidneys were obtained. Images of the urinary bladder were also obtained. FINDINGS: RIGHT KIDNEY - 13.0 x 6.6 x 6.0 cm Resistive Index - 0.63 Cortical Thickness - 1.2 cm LEFT KIDNEY - 11.5 x 5.1 x 5.4 cm Resistive Index - 0.62 Cortical Thickness - 1.2 cm URINARY BLADDER catheter  KIDNEYS:  Both kidneys are normal in size and contour. The resistive indices are normal.  MASS/CYST: No solid or cystic lesion of either kidney is seen. HYDRONEPHROSIS:  There is no hydronephrosis. CALCULI:  No calculi are seen. BLADDER:  Urinary bladder empty with Hampton catheter in place. Moderate amount of sludge in the gallbladder. .     Normal renal ultrasound **This report has been created using voice recognition software. It may contain minor errors which are inherent in voice recognition technology. ** Final report electronically signed by Dr. Terrence Duran on 9/8/2021 4:08 PM    XR CHEST PORTABLE    Result Date: 9/8/2021  PROCEDURE: XR CHEST PORTABLE CLINICAL INFORMATION: 60-year-old male who underwent endotracheal and nasogastric tube placement. COMPARISON: Chest x-ray 09/08/2021. TECHNIQUE: 3 AP semiupright views of the chest were obtained. FINDINGS: There is a nasogastric tube coursing along the route of the esophagus and terminating in the stomach.  There is a nasogastric tube approximately 6.5 cm from the stan. The cardiac silhouette and pulmonary vasculature are within normal limits. There is no significant pleural effusion or pneumothorax. Visualized portions of the upper abdomen are within normal limits. The osseous structures are intact. No acute fractures or suspicious osseous lesions. Interval placement of an endotracheal and nasogastric tube. Otherwise stable appearance of the chest when compared to the prior study. **This report has been created using voice recognition software. It may contain minor errors which are inherent in voice recognition technology. ** Final report electronically signed by Dr Sonja Madsen on 9/8/2021 4:21 PM    XR CHEST PORTABLE    Result Date: 9/8/2021  PROCEDURE: XR CHEST PORTABLE CLINICAL INFORMATION: rhonci and obtunded. COMPARISON: No prior study. TECHNIQUE: AP upright view of the chest. FINDINGS: The heart size is at the upper limits of normal.The mediastinum is not widened. There are no pulmonary infiltrates or effusions. The pulmonary vascularity is normal. No suspicious osseous lesions are present. No acute cardiopulmonary process. **This report has been created using voice recognition software. It may contain minor errors which are inherent in voice recognition technology. ** Final report electronically signed by Dr. Samuel Garcia on 9/8/2021 7:56 AM    MRI BRAIN WO CONTRAST    Result Date: 9/8/2021  PROCEDURE: MRI BRAIN WO CONTRAST CLINICAL INFORMATION HCV. Unresponsive. HSV on the right forehead. Assess for HSV encephalitis. COMPARISON: Head CT 9/7/2021. TECHNIQUE: Multiplanar and multiple spin echo MRI images were obtained of the brain without contrast. FINDINGS: The diffusion-weighted images are normal.  The brain volume is mildly reduced. There is a mild amount of signal hyperintensity on the FLAIR and T2-weighted sequences in the white matter of the brain.  This is consistent with mild severity chronic small vessel ischemic changes. There is no abnormal signal in the temporal lobes. There is a small area of old infarct or volume loss in the periphery of the left cerebellum. There are no intra-or extra-axial collections. There is no hydrocephalus, midline shift or mass effect. There is mineralization in the medial aspects of the basal ganglia bilaterally. The major intracranial vascular flow voids are present. The midline craniocervical junction structures are normal.  The pituitary gland and brainstem are normal. There is some fullness of the nasal mucosa. There is some trace fluid in the left mastoid air cells. 1. No acute findings. 2. Mild severity chronic small vessel ischemic changes. **This report has been created using voice recognition software. It may contain minor errors which are inherent in voice recognition technology. ** Final report electronically signed by Dr. Samuel Garcia on 9/8/2021 11:48 AM        Assessment and Plan            1. 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 Robert Paredes and he is in agreement with the assessment and plan.   Electronically signed by Yary Ng PA-C on 9/9/21 at 5:42 PM EDT

## 2021-09-09 NOTE — PROGRESS NOTES
[facility] Laboratory Technician Worksheet      EEG Date: 2021    Name: Kaylie Lucas   : 1940   Age: [de-identified] y.o. SEX: male    [facility] [address] MRN: [id number]           CSN: [id number]      Ordering Provider: Barry Lange  EEG Number: [id number] Time of Test:  7854    Hand: Unknown   Sedation: yes - propofol    H.V. 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 Presented to ED 21 with painful lesions, facial swelling, photophobia which began to develop 21. Propofol 14mcg during EEG    MRI Brain 21  Impression       1. No acute findings. 2. Mild severity chronic small vessel ischemic changes. CT Head 21  Impression   Impression:   1. No acute intracranial hemorrhage or process identified. 2. Nonspecific tissue swelling at the right forehead and in the right    malar region.          Past Medical History:       Diagnosis Date    Hypertension        Scheduled Meds:   midazolam  2 mg IntraVENous Once    piperacillin-tazobactam  3,375 mg IntraVENous Q12H    [Held by provider] acyclovir  10 mg/kg (Ideal) IntraVENous Q24H    prednisoLONE acetate  1 drop Right Eye 4 times per day    [Held by provider] QUEtiapine  25 mg Oral BID    [Held by provider] baclofen  20 mg Oral TID    calcium elemental  500 mg Oral BID    [Held by provider] lisinopril  20 mg Oral Daily    [Held by provider] magnesium oxide  400 mg Oral 4x Daily AC & HS    vitamin B-6  200 mg Oral Daily    [Held by provider] tamsulosin  0.4 mg Oral Daily    Vitamin D  1,000 Units Oral BID AC    zinc sulfate  50 mg Oral Daily    sodium chloride flush  5-40 mL IntraVENous 2 times per day     Continuous Infusions:   norepinephrine 4 mcg/min (21 1236)    propofol 25 mcg/kg/min (21 0915)  niCARdipine Stopped (09/08/21 2040)    sodium chloride      sodium chloride 150 mL/hr at 09/09/21 1232     PRN Meds:.sodium chloride flush, sodium chloride, ondansetron **OR** ondansetron, polyethylene glycol, acetaminophen **OR** acetaminophen, gabapentin, morphine    Technician: Lupillo Prior 9/9/2021

## 2021-09-09 NOTE — PROGRESS NOTES
Comprehensive Nutrition Assessment    Type and Reason for Visit:  Initial (vent; TF recs (likely start TF 9/10))    Nutrition Recommendations/Plan:   If u/a to extubate - recommend initiate TF. Suggest Nepro carb steady at 10 ml/hr; increase by 10 ml every 4 hours, as tolerated, to goal rate of 30 ml/hr. Recommend flush 2.5 oz. Proteinex daily. Free water flush per MD.    Nutrition Assessment:    Pt. nutritionally compromised AEB NPO status d/t intubation. At risk for further nutrition compromise r/t respiratory failure, s/p code blue 9/8, acute encephalopathy secondary to herpes virus and underlying medical condition (hx HTN). Nutrition recommendations/interventions as per above. Malnutrition Assessment:  Malnutrition Status:  Insufficient data    Context:  Acute Illness     Findings of the 6 clinical characteristics of malnutrition:  Energy Intake:  Unable to assess  Weight Loss:  Unable to assess     Body Fat Loss:  Unable to assess     Muscle Mass Loss:  Unable to assess    Fluid Accumulation:  Unable to assess     Strength:  Not Performed    Estimated Daily Nutrient Needs:  Energy (kcal):  2395-1319 kcals (15-18); Weight Used for Energy Requirements:   (97 kgm)     Protein (g):   gm (1.2-1.5); Weight Used for Protein Requirements:  Ideal (67 kgm)        Fluid (ml/day):  per MD; Method Used for Fluid Requirements:  Other (Comment)      Nutrition Related Findings:   Pt. Seen during rounds - per MD - wait a day prior to starting TF; intubated 9/8 d/t AMS and hypoxia; +varicella-Zoster per LP; now receiving HD; diprovan infusing at 14 ml/hr; 9/9: Glucose 106, BUN 41, Cr 5.5, Potassium 4.7;  Rx includes ATB, Versed, Levophed, Seroquel, Glycolax, Vitamin B6, D, Oscal, Zinc      Wounds:  None       Current Nutrition Therapies:    Diet NPO  Additional Calorie Sources:   Diprovan at 14 ml/hr providing pt. with 370 kcals from IV lipid emulsion/24 hours    Anthropometric Measures:  · Height: 5' 7\" (170.2 cm)  · Current Body Weight: 213 lb 10 oz (96.9 kg) (9/9 +scleral edema)   · Admission Body Weight: 205 lb 8 oz (93.2 kg) (9/8 +scleral edema)    · Usual Body Weight:  (per EMR: 9/5/21: 210# stated weight)     · Ideal Body Weight: 148 lbs;     · BMI: 33.5  · BMI Categories: Obese Class 1 (BMI 30.0-34. 9)       Nutrition Diagnosis:   · Inadequate oral intake related to cognitive or neurological impairment, impaired respiratory function as evidenced by NPO or clear liquid status due to medical condition, intubation      Nutrition Interventions:   Food and/or Nutrient Delivery:  Continue NPO (Recommend initiate TF.)  Nutrition Education/Counseling:  Education not appropriate   Coordination of Nutrition Care:  Continue to monitor while inpatient    Goals:  TF to provide 75% or more of estimated nutrition needs until able to transition to po diet. Nutrition Monitoring and Evaluation:   Behavioral-Environmental Outcomes:  None Identified   Food/Nutrient Intake Outcomes:  Enteral Nutrition Intake/Tolerance  Physical Signs/Symptoms Outcomes:  Biochemical Data, Chewing or Swallowing, GI Status, Fluid Status or Edema, Hemodynamic Status, Nutrition Focused Physical Findings, Skin, Weight     Discharge Planning:     Too soon to determine     Electronically signed by Daphne Ospina RD, LD on 9/9/21 at 2:37 PM EDT    Contact: [phone]

## 2021-09-09 NOTE — PROCEDURES
Central Venous Catheterization Procedure Note    Indication:  [] Lack of adequate peripheral iv access  [] Infusion of medications with high risk of extravasation injury  [] Hemodynamic monitoring  [x] Hemodialysis  [] Extracorporeal therapies  [] Other:                     Time Out:  [x] Time out was completed immediately prior to the start of the procedure which included verification of the correct patient, correct site and agreement on the procedure to be done. [] Time out was not done because procedure was emergent      Consent:  [x] The patient/surrogate decision maker was informed of the procedure indications, risks, benefits and alternatives. Questions were answered and consent was obtained to proceed with the procedure. [] Consent was not obtained, because the procedure was emergent or patient was unable to consent and surrogate decision maker was not available. Type of Central Line Being Placed:   [] Central venous    [x] Hemodialysis  [] Pulmonary artery    Location:   [] Internal Jugular Vein [] Right [] Left  [x] Subclavian Vein  [] Right [x] Left  [] Femoral Vein   [] Right [] Left      Central Line Bundle:  [x] I washed/disinfected my hands prior to starting procedure  [x] Hat      [x] Mask      [x] Sterile gown      [x] Sterile gloves were worn throughout the procedure  [x] Everyone in the room during the procedure wore a mask  [x] Chlorhexidine was utilized to prep the skin and allowed to dry completely  [] Povidone-iodine was used to prep the skin and allowed to dry completely  [x] Full body drape was used to cover the patient    Ultrasound Guidance:   [] Yes      [] No    Anesthetic Used:  [x] Lidocaine      [] Other    Sterile Technique Used Throughout Procedure?   [x] Yes      [] No    Description/Findings:   [x] Dark nonpulsatile blood return obtained from all ports  [] Appropriate wavefom(s) seen  [] Other    Complications:  [x] None apparent  [] Other:    Blood Cultures obtained during line insertion due to the following indication(s):  No indication(s) met for blood culture collection    Follow-up x-ray: Done and shown to be in good position. Proceduralist:   I personally performed the procedure documented as signed by this procedure note. Assistant(s):  Not applicable    Supervision:  Supervision was required for this procedure. Gustavo Hickman, ICU ARNP was physically present and supervised all key elements of the procedure.      Electronically signed by Tristian Roe DO on 9/9/2021 at 10:51 AM

## 2021-09-09 NOTE — PROGRESS NOTES
Progress note: Infectious diseases    Patient - Andrae Doll,  Age - [de-identified] y.o.    - 1940      Room Number - 4D-17/017-A   MRN -  [id number]   Acct # - [de-identified]  Date of Admission -  2021 11:26 AM    SUBJECTIVE:   He is in ICU on Vent. He had dialysis catheter placed. Discussed with nephrology  OBJECTIVE   VITALS    height is 5' 7\" (1.702 m) and weight is 213 lb 10 oz (96.9 kg). His oral temperature is 98.7 °F (37.1 °C). His blood pressure is 118/75 and his pulse is 57. His respiration is 13 and oxygen saturation is 99%.        Wt Readings from Last 3 Encounters:   21 213 lb 10 oz (96.9 kg)   21 210 lb (95.3 kg)   19 207 lb 3.2 oz (94 kg)       I/O (24 Hours)    Intake/Output Summary (Last 24 hours) at 2021 1112  Last data filed at 2021 0400  Gross per 24 hour   Intake 3887.51 ml   Output 4200 ml   Net -312.49 ml       General Appearance  Sedated on the vent  HEENT - he has right side V1 distribution blistering eruption  Neck - Supple, no mass  Lungs -  Bilateral  air entry, on the vent  Cardiovascular - Heart sounds are normal.    Abdomen - soft, not distended, nontender,   Neurologic -sedated on the vent  Skin - No bruising or bleeding  Extremities - No edema, no cyanosis, clubbing     MEDICATIONS:      midazolam  2 mg IntraVENous Once    piperacillin-tazobactam  3,375 mg IntraVENous Q12H    [Held by provider] acyclovir  10 mg/kg (Ideal) IntraVENous Q24H    prednisoLONE acetate  1 drop Right Eye 4 times per day    [Held by provider] QUEtiapine  25 mg Oral BID    [Held by provider] baclofen  20 mg Oral TID    calcium elemental  500 mg Oral BID    [Held by provider] lisinopril  20 mg Oral Daily    [Held by provider] magnesium oxide  400 mg Oral 4x Daily AC & HS    vitamin B-6  200 mg Oral Daily    [Held by provider] tamsulosin  0.4 mg Oral Daily    Vitamin D  1,000 Units Oral BID AC    zinc sulfate  50 mg Oral Daily    sodium chloride flush  5-40 mL IntraVENous 2 times per day      norepinephrine Stopped (09/09/21 0719)    propofol 25 mcg/kg/min (09/09/21 0952)    niCARdipine Stopped (09/08/21 2040)    sodium chloride      sodium chloride 200 mL/hr at 09/09/21 0432     sodium chloride flush, sodium chloride, ondansetron **OR** ondansetron, polyethylene glycol, acetaminophen **OR** acetaminophen, gabapentin, morphine      LABS:     CBC:   Recent Labs     09/07/21  0356 09/08/21 0906 09/09/21  0545   WBC 7.8 6.4 8.5   HGB 14.0 12.3* 13.6*    156 165     BMP:    Recent Labs     09/08/21 0906 09/08/21  2159 09/09/21  0545    138 140   K 4.5 4.2 4.7    105 109   CO2 19* 21* 19*   BUN 40* 42* 41*   CREATININE 5.1* 5.2* 5.5*   GLUCOSE 99 100 106     Calcium:  Recent Labs     09/09/21  0545   CALCIUM 8.1*    Glucose:  Recent Labs     09/07/21  2102   POCGLU 96     HgbA1C: No results for input(s): LABA1C in the last 72 hours. INR: No results for input(s): INR in the last 72 hours. Hepatic:   Recent Labs     09/07/21  0356   ALKPHOS 79   ALT 10*   AST 17   PROT 6.2   BILITOT 0.8   LABALBU 3.7     Amylase and Lipase:  Recent Labs     09/09/21  0545   LACTA 1.1     Lactic Acid:   Recent Labs     09/09/21  0545   LACTA 1.1     Troponin: No results for input(s): CKTOTAL, CKMB, TROPONINI in the last 72 hours. BNP: No results for input(s): BNP in the last 72 hours.     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         ASSESSMENT/PLAN   HZ opthalmicus  Respiratory failure:  DEANDRE: concern for acyclovir induced DEANDRE. Discussed with nephrology. Will hold acyclovir today, will have HD today and will resume acyclovir. It was already adjusted for kidney function  Encephlopathy :  molecular panel is positive for VZ virus on CSF. likely contributing to the change in mentation.   Continue supportive care    Philip Suresh MD, MD, FACP 9/9/2021 11:12 AM

## 2021-09-10 LAB
ANION GAP SERPL CALCULATED.3IONS-SCNC: 12 MEQ/L (ref 8–16)
ANION GAP SERPL CALCULATED.3IONS-SCNC: 14 MEQ/L (ref 8–16)
BASOPHILS # BLD: 0.4 %
BASOPHILS ABSOLUTE: 0 THOU/MM3 (ref 0–0.1)
BUN BLDV-MCNC: 22 MG/DL (ref 7–22)
BUN BLDV-MCNC: 24 MG/DL (ref 7–22)
CALCIUM SERPL-MCNC: 9.1 MG/DL (ref 8.5–10.5)
CALCIUM SERPL-MCNC: 9.3 MG/DL (ref 8.5–10.5)
CHLORIDE BLD-SCNC: 110 MEQ/L (ref 98–111)
CHLORIDE BLD-SCNC: 113 MEQ/L (ref 98–111)
CO2: 21 MEQ/L (ref 23–33)
CO2: 22 MEQ/L (ref 23–33)
CREAT SERPL-MCNC: 2.3 MG/DL (ref 0.4–1.2)
CREAT SERPL-MCNC: 2.9 MG/DL (ref 0.4–1.2)
EOSINOPHIL # BLD: 1.9 %
EOSINOPHILS ABSOLUTE: 0.2 THOU/MM3 (ref 0–0.4)
ERYTHROCYTE [DISTWIDTH] IN BLOOD BY AUTOMATED COUNT: 14.6 % (ref 11.5–14.5)
ERYTHROCYTE [DISTWIDTH] IN BLOOD BY AUTOMATED COUNT: 47.3 FL (ref 35–45)
GFR SERPL CREATININE-BSD FRML MDRD: 21 ML/MIN/1.73M2
GFR SERPL CREATININE-BSD FRML MDRD: 27 ML/MIN/1.73M2
GLUCOSE BLD-MCNC: 119 MG/DL (ref 70–108)
GLUCOSE BLD-MCNC: 144 MG/DL (ref 70–108)
HCT VFR BLD CALC: 41.5 % (ref 42–52)
HEMOGLOBIN: 13.7 GM/DL (ref 14–18)
IMMATURE GRANS (ABS): 0.05 THOU/MM3 (ref 0–0.07)
IMMATURE GRANULOCYTES: 0.6 %
LYMPHOCYTES # BLD: 10.3 %
LYMPHOCYTES ABSOLUTE: 0.9 THOU/MM3 (ref 1–4.8)
MCH RBC QN AUTO: 29.5 PG (ref 26–33)
MCHC RBC AUTO-ENTMCNC: 33 GM/DL (ref 32.2–35.5)
MCV RBC AUTO: 89.4 FL (ref 80–94)
MONOCYTES # BLD: 8.6 %
MONOCYTES ABSOLUTE: 0.7 THOU/MM3 (ref 0.4–1.3)
MRSA SCREEN: NORMAL
NUCLEATED RED BLOOD CELLS: 0 /100 WBC
PLATELET # BLD: 154 THOU/MM3 (ref 130–400)
PMV BLD AUTO: 8.9 FL (ref 9.4–12.4)
POTASSIUM REFLEX MAGNESIUM: 3.8 MEQ/L (ref 3.5–5.2)
POTASSIUM SERPL-SCNC: 3.9 MEQ/L (ref 3.5–5.2)
RBC # BLD: 4.64 MILL/MM3 (ref 4.7–6.1)
SEG NEUTROPHILS: 78.2 %
SEGMENTED NEUTROPHILS ABSOLUTE COUNT: 6.5 THOU/MM3 (ref 1.8–7.7)
SODIUM BLD-SCNC: 145 MEQ/L (ref 135–145)
SODIUM BLD-SCNC: 147 MEQ/L (ref 135–145)
WBC # BLD: 8.3 THOU/MM3 (ref 4.8–10.8)

## 2021-09-10 PROCEDURE — 94761 N-INVAS EAR/PLS OXIMETRY MLT: CPT

## 2021-09-10 PROCEDURE — 94003 VENT MGMT INPAT SUBQ DAY: CPT

## 2021-09-10 PROCEDURE — 2580000003 HC RX 258: Performed by: INTERNAL MEDICINE

## 2021-09-10 PROCEDURE — 2580000003 HC RX 258: Performed by: STUDENT IN AN ORGANIZED HEALTH CARE EDUCATION/TRAINING PROGRAM

## 2021-09-10 PROCEDURE — 99291 CRITICAL CARE FIRST HOUR: CPT | Performed by: INTERNAL MEDICINE

## 2021-09-10 PROCEDURE — 6360000002 HC RX W HCPCS: Performed by: STUDENT IN AN ORGANIZED HEALTH CARE EDUCATION/TRAINING PROGRAM

## 2021-09-10 PROCEDURE — 2000000000 HC ICU R&B

## 2021-09-10 PROCEDURE — 85025 COMPLETE CBC W/AUTO DIFF WBC: CPT

## 2021-09-10 PROCEDURE — 80048 BASIC METABOLIC PNL TOTAL CA: CPT

## 2021-09-10 PROCEDURE — 6360000002 HC RX W HCPCS

## 2021-09-10 PROCEDURE — 6360000002 HC RX W HCPCS: Performed by: PHYSICIAN ASSISTANT

## 2021-09-10 PROCEDURE — 2700000000 HC OXYGEN THERAPY PER DAY

## 2021-09-10 PROCEDURE — 6370000000 HC RX 637 (ALT 250 FOR IP): Performed by: FAMILY MEDICINE

## 2021-09-10 PROCEDURE — 99232 SBSQ HOSP IP/OBS MODERATE 35: CPT | Performed by: PHYSICIAN ASSISTANT

## 2021-09-10 PROCEDURE — 2500000003 HC RX 250 WO HCPCS: Performed by: STUDENT IN AN ORGANIZED HEALTH CARE EDUCATION/TRAINING PROGRAM

## 2021-09-10 PROCEDURE — 2580000003 HC RX 258: Performed by: FAMILY MEDICINE

## 2021-09-10 PROCEDURE — 36415 COLL VENOUS BLD VENIPUNCTURE: CPT

## 2021-09-10 PROCEDURE — 2580000003 HC RX 258: Performed by: PHYSICIAN ASSISTANT

## 2021-09-10 PROCEDURE — 99233 SBSQ HOSP IP/OBS HIGH 50: CPT | Performed by: INTERNAL MEDICINE

## 2021-09-10 PROCEDURE — 6360000002 HC RX W HCPCS: Performed by: INTERNAL MEDICINE

## 2021-09-10 RX ORDER — LORAZEPAM 2 MG/ML
INJECTION INTRAMUSCULAR
Status: COMPLETED
Start: 2021-09-10 | End: 2021-09-10

## 2021-09-10 RX ORDER — DEXTROSE MONOHYDRATE 50 MG/ML
INJECTION, SOLUTION INTRAVENOUS CONTINUOUS
Status: DISCONTINUED | OUTPATIENT
Start: 2021-09-10 | End: 2021-09-12

## 2021-09-10 RX ORDER — LORAZEPAM 2 MG/ML
2 INJECTION INTRAMUSCULAR ONCE
Status: DISCONTINUED | OUTPATIENT
Start: 2021-09-10 | End: 2021-09-14

## 2021-09-10 RX ORDER — GENTAMICIN SULFATE 3 MG/ML
1 SOLUTION/ DROPS OPHTHALMIC
Status: CANCELLED | OUTPATIENT
Start: 2021-09-10

## 2021-09-10 RX ADMIN — LORAZEPAM 2 MG: 2 INJECTION INTRAMUSCULAR; INTRAVENOUS at 15:09

## 2021-09-10 RX ADMIN — DEXTROSE MONOHYDRATE 5 MG/HR: 50 INJECTION, SOLUTION INTRAVENOUS at 13:50

## 2021-09-10 RX ADMIN — PROPOFOL 15 MCG/KG/MIN: 10 INJECTION, EMULSION INTRAVENOUS at 01:44

## 2021-09-10 RX ADMIN — LEVETIRACETAM 1000 MG: 100 INJECTION, SOLUTION INTRAVENOUS at 17:29

## 2021-09-10 RX ADMIN — SODIUM CHLORIDE, PRESERVATIVE FREE 10 ML: 5 INJECTION INTRAVENOUS at 20:21

## 2021-09-10 RX ADMIN — Medication 50 MG: at 08:53

## 2021-09-10 RX ADMIN — PROPOFOL 10 MCG/KG/MIN: 10 INJECTION, EMULSION INTRAVENOUS at 15:11

## 2021-09-10 RX ADMIN — Medication 200 MG: at 08:53

## 2021-09-10 RX ADMIN — PIPERACILLIN AND TAZOBACTAM 3375 MG: 3; .375 INJECTION, POWDER, LYOPHILIZED, FOR SOLUTION INTRAVENOUS at 00:28

## 2021-09-10 RX ADMIN — ACYCLOVIR SODIUM 330 MG: 500 INJECTION, SOLUTION INTRAVENOUS at 12:43

## 2021-09-10 RX ADMIN — DEXTROSE MONOHYDRATE 5 MG/HR: 50 INJECTION, SOLUTION INTRAVENOUS at 08:55

## 2021-09-10 RX ADMIN — PREDNISOLONE ACETATE 1 DROP: 10 SUSPENSION/ DROPS OPHTHALMIC at 17:32

## 2021-09-10 RX ADMIN — PREDNISOLONE ACETATE 1 DROP: 10 SUSPENSION/ DROPS OPHTHALMIC at 12:43

## 2021-09-10 RX ADMIN — PREDNISOLONE ACETATE 1 DROP: 10 SUSPENSION/ DROPS OPHTHALMIC at 00:29

## 2021-09-10 RX ADMIN — DEXTROSE MONOHYDRATE 125 ML/HR: 50 INJECTION, SOLUTION INTRAVENOUS at 20:19

## 2021-09-10 RX ADMIN — LEVETIRACETAM 1000 MG: 100 INJECTION, SOLUTION INTRAVENOUS at 21:08

## 2021-09-10 RX ADMIN — SODIUM CHLORIDE, PRESERVATIVE FREE 10 ML: 5 INJECTION INTRAVENOUS at 08:53

## 2021-09-10 RX ADMIN — DEXTROSE MONOHYDRATE 7.5 MG/HR: 50 INJECTION, SOLUTION INTRAVENOUS at 21:09

## 2021-09-10 RX ADMIN — MORPHINE SULFATE 4 MG: 4 INJECTION, SOLUTION INTRAMUSCULAR; INTRAVENOUS at 04:06

## 2021-09-10 RX ADMIN — PREDNISOLONE ACETATE 1 DROP: 10 SUSPENSION/ DROPS OPHTHALMIC at 05:10

## 2021-09-10 RX ADMIN — Medication 1000 UNITS: at 17:32

## 2021-09-10 RX ADMIN — SODIUM CHLORIDE 150 ML/HR: 9 INJECTION, SOLUTION INTRAVENOUS at 01:43

## 2021-09-10 RX ADMIN — CALCIUM 500 MG: 500 TABLET ORAL at 08:53

## 2021-09-10 RX ADMIN — LEVETIRACETAM 500 MG: 100 INJECTION, SOLUTION INTRAVENOUS at 05:09

## 2021-09-10 RX ADMIN — SODIUM CHLORIDE: 9 INJECTION, SOLUTION INTRAVENOUS at 08:23

## 2021-09-10 ASSESSMENT — PULMONARY FUNCTION TESTS
PIF_VALUE: 15
PIF_VALUE: 14
PIF_VALUE: 16
PIF_VALUE: 15
PIF_VALUE: 18
PIF_VALUE: 13

## 2021-09-10 ASSESSMENT — PAIN SCALES - GENERAL
PAINLEVEL_OUTOF10: 7
PAINLEVEL_OUTOF10: 0
PAINLEVEL_OUTOF10: 4
PAINLEVEL_OUTOF10: 0

## 2021-09-10 NOTE — PROGRESS NOTES
Progress note: Infectious diseases    Patient - Carol Elias,  Age - [de-identified] y.o.    - 1940      Room Number - 4D-17/017-A   MRN -  [id number]   Acct # - [de-identified]  Date of Admission -  2021 11:26 AM    SUBJECTIVE:   Patient is intubated and on spontaneous mechanical ventilation. Per nursing the patient has been experiencing episodes of hypertension and started on nicardipine gtt. Also, the patient was not responding to neurological examination while on diprivan and this has been held. The patient is now more responsive. no clinical seizure noted. OBJECTIVE   VITALS    height is 5' 7\" (1.702 m) and weight is 213 lb 10 oz (96.9 kg). His oral temperature is 98.7 °F (37.1 °C). His blood pressure is 166/101 (abnormal) and his pulse is 94. His respiration is 33 (abnormal) and oxygen saturation is 98%. Wt Readings from Last 3 Encounters:   21 213 lb 10 oz (96.9 kg)   21 210 lb (95.3 kg)   19 207 lb 3.2 oz (94 kg)       I/O (24 Hours)    Intake/Output Summary (Last 24 hours) at 9/10/2021 0915  Last data filed at 9/10/2021 0529  Gross per 24 hour   Intake 4303 ml   Output 4750 ml   Net -447 ml       General Appearance: Intubated  HEENT - Pustular eruptions on right side of head in V1 distribution in multiple stages of healing. Normocephalic, atraumatic, pink conjunctiva,  anicteric sclera  Neck - Supple, no mass  Lungs - On mechanical ventilation. Bilateral good air entry, no rhonchi, no wheeze  Cardiovascular - Heart sounds are normal.  Regular rate and rhythm without murmur, gallop or rub.   Abdomen - soft, not distended, nontender  Neurologic - Responsive to painful stimuli only  Skin - No bruising or bleeding; rash describe as above on right side of head  Extremities - No edema, no cyanosis, clubbing     MEDICATIONS:      midazolam  2 mg IntraVENous Once    piperacillin-tazobactam  3,375 mg IntraVENous Q12H    levetiracetam  500 mg IntraVENous Q12H    [Held by provider] acyclovir  10 mg/kg (Ideal) IntraVENous Q24H    prednisoLONE acetate  1 drop Right Eye 4 times per day    [Held by provider] QUEtiapine  25 mg Oral BID    [Held by provider] baclofen  20 mg Oral TID    calcium elemental  500 mg Oral BID    [Held by provider] lisinopril  20 mg Oral Daily    [Held by provider] magnesium oxide  400 mg Oral 4x Daily AC & HS    vitamin B-6  200 mg Oral Daily    [Held by provider] tamsulosin  0.4 mg Oral Daily    Vitamin D  1,000 Units Oral BID AC    zinc sulfate  50 mg Oral Daily    sodium chloride flush  5-40 mL IntraVENous 2 times per day      niCARdipine 5 mg/hr (09/10/21 0855)    norepinephrine Stopped (09/09/21 1710)    propofol 15 mcg/kg/min (09/10/21 0144)    sodium chloride      sodium chloride 200 mL/hr at 09/10/21 0823     sodium chloride flush, sodium chloride, ondansetron **OR** ondansetron, polyethylene glycol, acetaminophen **OR** acetaminophen, gabapentin, morphine      LABS:     CBC:   Recent Labs     09/08/21  0906 09/09/21  0545   WBC 6.4 8.5   HGB 12.3* 13.6*    165     BMP:    Recent Labs     09/08/21  0906 09/08/21  2159 09/09/21  0545    138 140   K 4.5 4.2 4.7    105 109   CO2 19* 21* 19*   BUN 40* 42* 41*   CREATININE 5.1* 5.2* 5.5*   GLUCOSE 99 100 106     Calcium:  Recent Labs     09/09/21  0545   CALCIUM 8.1*     Ionized Calcium:No results for input(s): IONCA in the last 72 hours. Magnesium:  Recent Labs     09/09/21  0545   MG 2.5*     Phosphorus:  Recent Labs     09/09/21  0545   PHOS 5.6*     BNP:No results for input(s): BNP in the last 72 hours. Glucose:  Recent Labs     09/07/21  2102   POCGLU 96     HgbA1C: No results for input(s): LABA1C in the last 72 hours. INR: No results for input(s): INR in the last 72 hours. Hepatic: No results for input(s): ALKPHOS, ALT, AST, PROT, BILITOT, BILIDIR, LABALBU in the last 72 hours.   Amylase and Lipase:  Recent Labs     09/09/21  0545   LACTA 1.1     Lactic Acid:   Recent Labs     09/09/21  0545   LACTA 1.1     Troponin: No results for input(s): CKTOTAL, CKMB, TROPONINI in the last 72 hours. BNP: No results for input(s): BNP in the last 72 hours. 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 Initially started on acyclovir. He was seen by Dr. Ceferino Hall (Ophthalmology) with recommendation for prednisone eye drops every 8 hours  - Acyclovir being held for 1 day at the request of nephrology. May resume today (9/10/21). - Continue prednisolone ophthalmic drops    Acute renal failure - Likely secondary to acyclovir nephrotoxicity. US renal normal. Patient received HD yesterday (9/9/21) with o net ultrafiltration.  - Nephrology following with continuation of hemodialysis. Encephalopathy - Molecular panel from lumbar puncture (9/8/21) positive for varicella zoster on CSF. Mentation change from baseline is attributable to this finding. MRI brain without evidence of acute findings.  EEG 9/9/21 with ? seizure activitiy; patient dosed with Keppra which may be contributing to underlying mentation.  - Neurology following for ?seizure activity     UTI - Urine culture 9/9/21 demonstrating Enterococcus faecalis with sensitivities to ampicillin, nitrofurantoin, and penicillin G. Patient had no complaints of such on admission. Currently intubated on mechanical ventilation and unable to assess for UTI symptoms. Zosyn was initiated on 9/9/21 per intensivist team.    - patient was asymptomatic and organism is likely colonized. Recommend discontinuing Zosyn    Acute hypoxic respiratory failure - Sustained what may have been respiratory arrest on 9/8/21 and subsequently intubated and sedated. Remains on mechanical ventilation without sedation. Currently on spontaneous ventilation.  - Management per ICU team    Electronically signed by Hetty Lesch, DO on 9/10/2021 at 9:15 AM   Patient was seen and examined face-to-face by me  The chart, progress notes, labs and radiographs were reviewed. Case discussed with the nurse. Questions and concerns were addressed with his family  Need to resume acyclovir. Consider stopping zosyn  I agree with the progress note.

## 2021-09-10 NOTE — CARE COORDINATION
9/10/21, 3:23 PM EDT    DISCHARGE ON GOING [facility] 38 day: 4  Location: -17/017-A Reason for admit: Herpes zoster ophthalmicus [B02.30]   Procedure:   9/5 CT head: No acute findings  9/5 CT cervical spine: No acute findings  9/5 CT Facial bones: Right-sided facial swelling without evidence of acute osseous injury of the face  9/7 CT Head: No acute intracranial findings; Nonspecific tissue swelling at the right forehead and in the right malar region  9/8 Code Blue  9/8 Intubated  9/8 MRI Brain: No acute findings  9/8 Renal US: Normal  9/8 LP  9/9 TESSIO [facility]  9/9 EEG: revealed periodic lateralized discharges and excessive slowing in the delta range  9/9 HD: Net zero UF    Barriers to Discharge: On SBT, was off sedation and awaiting patient to waken and be more alert. Seizure like activity this afternoon; given IV ativan and restarted diprivan drip. IV acyclovir restarted. Remains on vent w/ETT on SBT, FIO2 30%, sats 96%. Afebrile. NSR- 's. Unable to follow commands; CAPUTO x4 to painful stim. Telemetry, OG to vishnu GARSIA, JUANITAs. Intensivist, Neurology, Nephrology, and ID following. PT/OT. CSF +Varicella-Zoster virus. Urine +enterococcus faecalis - likely colonization per ID. IVF, Cardene @ 5 mg/hr, diprivan @ 10 mcg/kg/min, IV acyclovir, IV keppra, prn IV morphine, prednisolone drips right eye, vit D, zinc. Na+ 147, CO2 22, BUN 24, Creat 2.9, hgb 13.7. PCP: LUIS HERNANDEZ  Readmission Risk Score: 20%  Patient Goals/Plan/Treatment Preferences: Home w/wife and resume [facility] Salazar for therapies. Has walker. SW on case.

## 2021-09-10 NOTE — PROGRESS NOTES
CRITICAL CARE PROGRESS NOTE      Patient:  Balta Vanegas    Unit/Bed:4D-17/017-A  YOB: 1940  MRN: [id number]   PCP: Radha Morrell  Date of Admission: 9/6/2021  Chief Complaint:-Right eye pain and swelling    Assessment and Plan:    Acute Hypoxic Respiratory Failure: Secondary to Infection. Patient also was very altered and concern for protecting his own airway. Currently requiring minimal pressure support and peep. Lung protective strategies by keeping Peak pressure <35 and Plateau Pressure <43. Patient is off sedation however is currently only responding to painful stimuli. We will continue to keep patient i on the vent due to patient still being encephalopathic. 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 CT of the head showed no acute intracranial hemorrhage or process in the brain. MRI of the brain shows no acute findings and mild severity chronic small vessel ischemic changes. LP performed on 9/8 positive for +Varicella-Zoster. resumed acyclovir today. Continues to be encephalopathic. Varicella-Zoster ophthalmicus: Rash is on the right side of the face/head in the V1 distribution. No history of VZV vaccine. Patient saw ophthalmology and recommends prednisone eyedrops every 6 hours until next appointment 9/20. Resumed acyclovir per ID recommendations. Acute renal failure: Worsening. Secondary to Acyclovir. Developed DEANDRE on 9/7 and patient was started on normal saline 125 mL/h continuous. All nephrotoxic agents are held. Patient is still making urine. Avoid further hypotension. Renally dosed meds. Will Start dialysis. Hemodialysis Catheter placed on 9/9  Seizure:  No hx of seizures. Neurology notified. EEG showed periodic lateralized discharges epileptogenic in nature indicates seizure tendency in proper clinical context. On Keppra 1 g twice daily IV daily. Enterococcus group D UTI: Sensitivities pending. Urine culture grew greater than 100,000 CFU.   On Zosyn sedated on ventilator. Scheduled Meds:   acyclovir  5 mg/kg (Ideal) IntraVENous Q24H    LORazepam  2 mg IntraVENous Once    levetiracetam  1,000 mg IntraVENous Q12H    midazolam  2 mg IntraVENous Once    prednisoLONE acetate  1 drop Right Eye 4 times per day    [Held by provider] QUEtiapine  25 mg Oral BID    [Held by provider] baclofen  20 mg Oral TID    calcium elemental  500 mg Oral BID    [Held by provider] lisinopril  20 mg Oral Daily    [Held by provider] magnesium oxide  400 mg Oral 4x Daily AC & HS    vitamin B-6  200 mg Oral Daily    [Held by provider] tamsulosin  0.4 mg Oral Daily    Vitamin D  1,000 Units Oral BID AC    zinc sulfate  50 mg Oral Daily    sodium chloride flush  5-40 mL IntraVENous 2 times per day     Continuous Infusions:   niCARdipine 5 mg/hr (09/10/21 1350)    dextrose      propofol 10 mcg/kg/min (09/10/21 1511)    sodium chloride         PHYSICAL EXAMINATION:  T:  98.1  P:  88. RR:  13 B/P:  110/73. FiO2:  30. O2 Sat:  99.  I/O:  -500  Body mass index is 33.46 kg/m². GCS:   6  PC: 12/6: TV: 353: RRTotal: 12: Ti:1 sec:   General:  Patient is acutely ill appearing white male  HEENT:  normocephalic and atraumatic. No scleral icterus. R sided Periorbital vesicular rash along V1 distribution  Neck: supple. No Thyromegaly. Lungs:  Orally intubated; decreased respiratory effort  Cardiac: RRR. No JVD. Abdomen: soft. Nontender. Extremities:  No clubbing, cyanosis, or edema x 4. Vasculature: capillary refill < 3 seconds. Palpable dorsalis pedis pulses. Skin:  warm and dry. Psych:  Unable to assess due to encephalopathic  Lymph:  No supraclavicular adenopathy. Neurologic:  No focal deficit. No seizures. Data: (All radiographs, tracings, PFTs, and imaging are personally viewed and interpreted unless otherwise noted).    Sodium 147, potassium 3.8, chloride 113, CO2 22, BUN 24, creatinine 2.9, anion gap 12, GFR 21, glucose 119, calcium 9.1  Hemoglobin 13.7, MCV 89.4, platelet count 154, white blood cell 8.3  Telemetry shows normal sinus rhythm  MRI brain without contrast shows no acute findings and mild severity chronic small vessel ischemic changes   Chest x-ray done on 9/8 shows no acute cardiopulmonary process. CT head without contrast on 9/8 shows no acute intracranial hemorrhage or President fine. Nonspecific tissue swelling of the right forehead and in the right malar region  Blood cultures done 9/7 showed no growth   EEG done on 9/9 that  shows abnormal EEG due to the presence of periodic lateralized discharges that may be epileptogenic in nature. In addition excessive slowing was seen in the delta range suggestive of cortical dysfunction encephalopathy. No clinical seizures were observed. Renal ultrasound done on 9/8 was normal.        Seen with multidisciplinary ICU team.  Meets Continued ICU Level Care Criteria:    [x] Yes   [] No - Transfer Planned to listed location:  [] HOSPITALIST CONTACTED-      Case and plan discussed with Dr. Nolberto Eid. Electronically signed by Nadja Rodriguez DO  CRITICAL CARE SPECIALIST  Patient seen by me. Case discussed with does not physician. 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 Continue to dialyze as necessary. Tinea with acyclovir. Italicized font represents changes to the note made by me. CC time 35 minutes. Time was discontiguous. Time does not include procedures. Time does include my direct assessment of the patient and coordination of care.   Electronically signed by Anabela Munoz MD on 9/13/2021 at 6:02 AM

## 2021-09-10 NOTE — PROGRESS NOTES
Comprehensive Nutrition Assessment    Type and Reason for Visit:  Reassess, Consult (start TF)    Nutrition Recommendations/Plan:   Plan  Nepro TF at 10 ml/hr & increase 10 ml every 6 hours as tolerated to goal of 40 ml/hr. Free H20 flush per    When pt extubated, recommend swallow evaluation as appropriate. Monitor ability for bm. Nutrition Assessment:    Pt. nutritionally compromised AEB NPO status d/t intubation, but plan TF start today. At risk for further nutrition compromise r/t respiratory failure, s/p code blue 9/8, intubated 9/8, acute encephalopathy secondary to herpes virus, DEANDRE with HD needed this admit and underlying medical condition (hx HTN). Nutrition recommendations/interventions as per above. Malnutrition Assessment:  Malnutrition Status:  Insufficient data    Context:  Acute Illness     Findings of the 6 clinical characteristics of malnutrition:  Energy Intake:  Unable to assess  Weight Loss:  Unable to assess     Body Fat Loss:  Unable to assess     Muscle Mass Loss:  Unable to assess    Fluid Accumulation:  Unable to assess     Strength:  Not Performed    Estimated Daily Nutrient Needs:  Energy (kcal):  9950-7889 kcals (15-18); Weight Used for Energy Requirements:   (97 kgm)     Protein (g):   gm (1.2-1.5); Weight Used for Protein Requirements:  Ideal (67 kgm)        Fluid (ml/day):  per MD; Method Used for Fluid Requirements:  Other (Comment)      Nutrition Related Findings:    Pt intubated 9/8 , diprivan is off, NPO. Spoke with Herminio LOCKETT who reports pt had HD yesterday & HD will be day to day pending on status, has OG, does not follow commands, should be able to use gut. Spoke with Dr Newman Fraction to start TF. No bm yet day 4 admit. Spoke with Leta Calvert RN & plan TF start.  BUN 41, Cr 5.5, PO5 5.6. meds includes ATB, Versed, Seroquel, Glycolax, Vitamin B6, D, Oscal, Zinc .   Wounds:  None       Current Nutrition Therapies:    Current Tube Feeding (TF) Orders:  · Feeding Route: Orogastric  · Formula: Renal Formula (Nepro carb steady)  · Schedule: Continuous (goal 40 ml/hr)  · Additives/Modulars:  (.)  · Water Flushes: per Dr   · Plan start TF at 10 ml/hr  · Goal TF & Flush Orders Provides: 1728 kcals, 78 grams protein, 154 grams CHO, 24 grams fiber 698 ml free H20 in 960 ml TF/24 hours      Anthropometric Measures:  · Height: 5' 7\" (170.2 cm)  · Current Body Weight: 213 lb 10 oz (96.9 kg) (9/9 +scleral edema)   · Admission Body Weight: 205 lb 8 oz (93.2 kg) (9/8 +scleral edema)    · Usual Body Weight:  (per EMR: 9/5/21: 210# stated weight)     · Ideal Body Weight: 148 lbs;     · BMI: 33.5    · BMI Categories: Obese Class 1 (BMI 30.0-34. 9)       Nutrition Diagnosis:   · Inadequate oral intake related to cognitive or neurological impairment, impaired respiratory function as evidenced by NPO or clear liquid status due to medical condition, intubation      Nutrition Interventions:   Food and/or Nutrient Delivery:  Continue NPO, Start Tube Feeding  Nutrition Education/Counseling:  Education not appropriate   Coordination of Nutrition Care:  Continue to monitor while inpatient    Goals:  TF to provide 75% or more of estimated nutrition needs until able to transition to po diet. Nutrition Monitoring and Evaluation:   Behavioral-Environmental Outcomes:  None Identified   Food/Nutrient Intake Outcomes:  Enteral Nutrition Intake/Tolerance  Physical Signs/Symptoms Outcomes:  Biochemical Data, Chewing or Swallowing, GI Status, Fluid Status or Edema, Hemodynamic Status, Nutrition Focused Physical Findings, Skin, Weight     Discharge Planning:     Too soon to determine     Electronically signed by Chaz Simpson RD, LD on 9/10/21 at 1:12 PM EDT    Contact: [phone]

## 2021-09-10 NOTE — PROGRESS NOTES
[facility]   Nephrology progress note  9/10/2021, 12:56 PM      Pt Name:    Laura Born  MRN:     [id number]     YOB: 1940  Admit Date:    9/6/2021 11:26 AM  Primary Care Physician:  Nelli Harvey   Room number  4D-17/017-A    Chief Complaint: Nephrology following for DEANDRE    Subjective:  Patient seen and examined  Discussed with family members earlier today  Discussed with ICU  Currently on IV Cardene drip  Remains nonoliguric  Had dialysis treatment last night      Objective:  24HR INTAKE/OUTPUT:      Intake/Output Summary (Last 24 hours) at 9/10/2021 1256  Last data filed at 9/10/2021 1241  Gross per 24 hour   Intake 5747 ml   Output 7850 ml   Net -2103 ml     I/O last 3 completed shifts: In: 8838 [I.V.:4153; IV Piggyback:150]  Out: 6568 [Urine:4750]  I/O this shift:  In: 1727 [I.V.:1444]  Out: 3100 [Urine:3100]  Admission weight: 210 lb (95.3 kg)  Wt Readings from Last 3 Encounters:   09/09/21 213 lb 10 oz (96.9 kg)   09/05/21 210 lb (95.3 kg)   03/19/19 207 lb 3.2 oz (94 kg)     Body mass index is 33.46 kg/m².     Physical examination  VITALS:     Vitals:    09/10/21 0600 09/10/21 0801 09/10/21 0830 09/10/21 0900   BP: 85/68  (!) 163/144 (!) 166/101   Pulse: 59  83 94   Resp: 16 18 19 (!) 33   Temp:   98.7 °F (37.1 °C)    TempSrc:   Oral    SpO2: 98% 99% 99% 98%   Weight:       Height:         General Appearance: Patient is intubated on mechanical ventilator  Mouth/Throat: ET tube present  Neck: No JVD noted  Lungs: Air entry diminished, no rales or rhonchi  Heart:  S1, S2 heard  GI: soft, non-tender, no guarding  Extremities: No significant pitting LE edema      Lab Data  CBC:   Recent Labs     09/08/21  0906 09/09/21  0545 09/10/21  1030   WBC 6.4 8.5 8.3   HGB 12.3* 13.6* 13.7*   HCT 39.0* 43.1 41.5*    165 154     BMP:  Recent Labs     09/08/21  2159 09/09/21  0545 09/10/21  1030    140 147*   K 4.2 4.7 3.8    109 113*   CO2 21* 19* 22*   BUN 42* 41* 24*   CREATININE 5.2* 5.5* 2.9*   GLUCOSE 100 106 119*   CALCIUM 8.4* 8.1* 9.1   MG  --  2.5*  --    PHOS  --  5.6*  --      Hepatic:   No results for input(s): LABALBU, AST, ALT, ALB, BILITOT, ALKPHOS in the last 72 hours. Meds:  Infusion:    niCARdipine 5 mg/hr (09/10/21 0855)    propofol Stopped (09/10/21 0730)    sodium chloride      sodium chloride 200 mL/hr at 09/10/21 1943     Meds:    acyclovir  5 mg/kg (Ideal) IntraVENous Q24H    midazolam  2 mg IntraVENous Once    levetiracetam  500 mg IntraVENous Q12H    prednisoLONE acetate  1 drop Right Eye 4 times per day    [Held by provider] QUEtiapine  25 mg Oral BID    [Held by provider] baclofen  20 mg Oral TID    calcium elemental  500 mg Oral BID    [Held by provider] lisinopril  20 mg Oral Daily    [Held by provider] magnesium oxide  400 mg Oral 4x Daily AC & HS    vitamin B-6  200 mg Oral Daily    [Held by provider] tamsulosin  0.4 mg Oral Daily    Vitamin D  1,000 Units Oral BID AC    zinc sulfate  50 mg Oral Daily    sodium chloride flush  5-40 mL IntraVENous 2 times per day     Meds prn: sodium chloride flush, sodium chloride, ondansetron **OR** ondansetron, polyethylene glycol, acetaminophen **OR** acetaminophen, gabapentin, morphine       Impression and Plan:  1. DEANDRE: Nonoliguric, likely ATN  Had emergent hemodialysis treatment yesterday out of concerns for acyclovir-induced neurotoxicity  Patient is nonoliguric  Discussed with ID  Resuming acyclovir per ID today  Patient had partial dose of acyclovir yesterday  We will continue with IV fluids  Can give loop diuretics for forced diuresis if urine output drops  Check BMP in a.m. Had dialysis yesterday and hence creatinine is lower today    2. Altered mental status. 3.  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 Herpes zoster ophthalmicus  5. Mild metabolic acidosis  6. Hypertension  7.   History of BPH    D/W ICU  D/w family members      Radha Russo MD  [facility]

## 2021-09-10 NOTE — PROGRESS NOTES
Neurology Progress Note    Date:9/10/2021       [id number]  Patient Name:Miah Blank     YOB: 1940     Age:81 y.o.    CC:   Chief Complaint   Patient presents with    Eye Pain        Subjective      Loconilo Graf is an [de-identified] M with PMH of HTN who presented to [facility] with painful lesions, facial swelling since and R eye drainage and photophobia which began to develop on 21. History of shingles unknown, no hx of shingles immunization. No additional history available due to the condition of the patient. 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 His acyclovir is currently being held due to concerns regarding his kidney function. Overnight, his nurse noted some myoclonic jerking in his upper extremities which went on to affect his lower extremities. He is not currently on any antiepileptic medications but was on propofol and Versed at the time. Interval history 9/10/2021:  Acyclovir held yesterday d/t DEANDRE. Plan to resume today per nephro and ID. Dialysis cath placed yesterday. Underwent dialysis last night. No witnessed seizure activity since starting the keppra.        Review of Systems   Review of Systems   Unable to perform ROS: Intubated       Medications   Scheduled Meds:    midazolam  2 mg IntraVENous Once    piperacillin-tazobactam  3,375 mg IntraVENous Q12H    levetiracetam  500 mg IntraVENous Q12H    [Held by provider] acyclovir  10 mg/kg (Ideal) IntraVENous Q24H    prednisoLONE acetate  1 drop Right Eye 4 times per day    [Held by provider] QUEtiapine  25 mg Oral BID    [Held by provider] baclofen  20 mg Oral TID    calcium elemental  500 mg Oral BID    [Held by provider] lisinopril  20 mg Oral Daily    [Held by provider] magnesium oxide  400 mg Oral 4x Daily AC & HS    vitamin B-6  200 mg Oral Daily    [Held by provider] tamsulosin  0.4 mg Oral Daily    Vitamin D  1,000 Units Oral BID AC    zinc sulfate  50 mg Oral Daily    sodium chloride flush  5-40 mL IntraVENous 2 times per day     Continuous Infusions:    norepinephrine Stopped (21)    propofol 15 mcg/kg/min (09/10/21 0144)    niCARdipine Stopped (21)    sodium chloride      sodium chloride 150 mL/hr (09/10/21 0143)     PRN Meds: sodium chloride flush, sodium chloride, ondansetron **OR** ondansetron, polyethylene glycol, acetaminophen **OR** acetaminophen, gabapentin, morphine    Past History    Past Medical History:   has a past medical history of Hypertension. Social History:   reports that he quit smoking about 51 years ago. His smoking use included cigarettes. He has a 10.00 pack-year smoking history. He has never used smokeless tobacco. He reports that he does not drink alcohol and does not use drugs. Family History:   Family History   Problem Relation Age of Onset    High Blood Pressure Mother     No Known Problems Father        Physical Examination      Vitals:  BP 85/68   Pulse 59   Temp 98.4 °F (36.9 °C) (Oral)   Resp 16   Ht 5' 7\" (1.702 m)   Wt 213 lb 10 oz (96.9 kg)   SpO2 98%   BMI 33.46 kg/m²   Temp (24hrs), Av.3 °F (36.8 °C), Min:97.8 °F (36.6 °C), Max:98.7 °F (37.1 °C)      I/O (24Hr): Intake/Output Summary (Last 24 hours) at 9/10/2021 0723  Last data filed at 9/10/2021 0529  Gross per 24 hour   Intake 4303 ml   Output 4750 ml   Net -447 ml       Physical Exam  Vitals reviewed. Constitutional:       Appearance: He is ill-appearing. HENT:      Head: Normocephalic and atraumatic. Right Ear: External ear normal.      Left Ear: External ear normal.      Mouth/Throat:      Mouth: Mucous membranes are moist.   Eyes:      General:         Right eye: Discharge present. Left eye: Discharge present. Pupils: Pupils are equal, round, and reactive to light.       Comments: Conjunctiva injected bilaterally   Cardiovascular: Rate and Rhythm: Normal rate and regular rhythm. Heart sounds: Normal heart sounds. No murmur heard. Pulmonary:      Breath sounds: Rhonchi present. Skin:     Findings: Rash present. Comments: Vesicular rash in right V1 distribution   Neurological:      Deep Tendon Reflexes:      Reflex Scores:       Patellar reflexes are 1+ on the right side and 1+ on the left side. Achilles reflexes are 1+ on the right side and 1+ on the left side. Neurologic Exam     Mental Status   Level of consciousness: responsive to painful stimuli  Mental status exam limited due to intubation     Cranial Nerves     CN III, IV, VI   Pupils are equal, round, and reactive to light. Right pupil: Size: 2 mm. Reactivity: sluggish. Left pupil: Size: 2 mm. Reactivity: sluggish. CN exam limited due to intubation. Motor Exam will withdraw his lower extremities to noxious stimuli. No withdrawal of upper extremities to noxious stimuli. Sensory Exam   Not assessed due to intubation     Gait, Coordination, and Reflexes     Reflexes   Right patellar: 1+  Left patellar: 1+  Right achilles: 1+  Left achilles: 1+  Gait and coordination deferred        Labs/Imaging/Diagnostics   Labs:  CBC:  Recent Labs     09/08/21  0906 09/09/21  0545   WBC 6.4 8.5   RBC 4.30* 4.70   HGB 12.3* 13.6*   HCT 39.0* 43.1   MCV 90.7 91.7    165     CHEMISTRIES:  Recent Labs     09/08/21  0906 09/08/21  2159 09/09/21  0545    138 140   K 4.5 4.2 4.7    105 109   CO2 19* 21* 19*   BUN 40* 42* 41*   CREATININE 5.1* 5.2* 5.5*   GLUCOSE 99 100 106   PHOS  --   --  5.6*   MG  --   --  2.5*     PT/INR:No results for input(s): PROTIME, INR in the last 72 hours. APTT:No results for input(s): APTT in the last 72 hours. LIVER PROFILE:  No results for input(s): AST, ALT, BILIDIR, BILITOT, ALKPHOS in the last 72 hours.     Imaging Last 24 Hours:  CT HEAD WO CONTRAST    Result Date: 9/7/2021  Exam: CT brain without contrast Comparison: None Clinical history: Acute mental status change Findings: The ventricles are normal in size and position. No intracranial masses or midline shift identified. No abnormal extra-axial fluid collections are seen. No acute intracranial hemorrhage. The visualized paranasal sinuses are clear. No skull fracture identified. Nonspecific tissue swelling at the right forehead and in the right malar region. Impression: 1. No acute intracranial hemorrhage or process identified. 2. Nonspecific tissue swelling at the right forehead and in the right malar region. This document has been electronically signed by: Santa Weston MD on 09/07/2021 11:02 PM All CTs at this facility use dose modulation techniques and iterative reconstructions, and/or weight-based dosing when appropriate to reduce radiation to a low as reasonably achievable. US RENAL COMPLETE    Result Date: 9/8/2021  BILATERAL RENAL ULTRASOUND: CLINICAL INFORMATION: Renal failure COMPARISON: No comparison available. TECHNIQUE: Multiple sonographic images of both kidneys were obtained. Images of the urinary bladder were also obtained. FINDINGS: RIGHT KIDNEY - 13.0 x 6.6 x 6.0 cm Resistive Index - 0.63 Cortical Thickness - 1.2 cm LEFT KIDNEY - 11.5 x 5.1 x 5.4 cm Resistive Index - 0.62 Cortical Thickness - 1.2 cm URINARY BLADDER catheter  KIDNEYS:  Both kidneys are normal in size and contour. The resistive indices are normal.  MASS/CYST: No solid or cystic lesion of either kidney is seen. HYDRONEPHROSIS:  There is no hydronephrosis. CALCULI:  No calculi are seen. BLADDER:  Urinary bladder empty with Hampton catheter in place. Moderate amount of sludge in the gallbladder. .     Normal renal ultrasound **This report has been created using voice recognition software. It may contain minor errors which are inherent in voice recognition technology. ** Final report electronically signed by Dr. Laurann Severe on 9/8/2021 4:08 PM    XR CHEST PORTABLE    Result Date: 9/8/2021  PROCEDURE: XR CHEST PORTABLE CLINICAL INFORMATION: 81-year-old male who underwent endotracheal and nasogastric tube placement. COMPARISON: Chest x-ray 09/08/2021. TECHNIQUE: 3 AP semiupright views of the chest were obtained. FINDINGS: There is a nasogastric tube coursing along the route of the esophagus and terminating in the stomach. There is a nasogastric tube approximately 6.5 cm from the stan. The cardiac silhouette and pulmonary vasculature are within normal limits. There is no significant pleural effusion or pneumothorax. Visualized portions of the upper abdomen are within normal limits. The osseous structures are intact. No acute fractures or suspicious osseous lesions. Interval placement of an endotracheal and nasogastric tube. Otherwise stable appearance of the chest when compared to the prior study. **This report has been created using voice recognition software. It may contain minor errors which are inherent in voice recognition technology. ** Final report electronically signed by Dr Jer Franklin on 9/8/2021 4:21 PM    XR CHEST PORTABLE    Result Date: 9/8/2021  PROCEDURE: XR CHEST PORTABLE CLINICAL INFORMATION: rhonci and obtunded. COMPARISON: No prior study. TECHNIQUE: AP upright view of the chest. FINDINGS: The heart size is at the upper limits of normal.The mediastinum is not widened. There are no pulmonary infiltrates or effusions. The pulmonary vascularity is normal. No suspicious osseous lesions are present. No acute cardiopulmonary process. **This report has been created using voice recognition software. It may contain minor errors which are inherent in voice recognition technology. ** Final report electronically signed by Dr. Wicho Mayo on 9/8/2021 7:56 AM    MRI BRAIN WO CONTRAST    Result Date: 9/8/2021  PROCEDURE: MRI BRAIN WO CONTRAST CLINICAL INFORMATION HCV. Unresponsive. HSV on the right forehead. Assess for HSV encephalitis. COMPARISON: Head CT 9/7/2021. TECHNIQUE: Multiplanar and multiple spin echo MRI images were obtained of the brain without contrast. FINDINGS: The diffusion-weighted images are normal.  The brain volume is mildly reduced. There is a mild amount of signal hyperintensity on the FLAIR and T2-weighted sequences in the white matter of the brain. This is consistent with mild severity chronic small vessel ischemic changes. There is no abnormal signal in the temporal lobes. There is a small area of old infarct or volume loss in the periphery of the left cerebellum. There are no intra-or extra-axial collections. There is no hydrocephalus, midline shift or mass effect. There is mineralization in the medial aspects of the basal ganglia bilaterally. The major intracranial vascular flow voids are present. The midline craniocervical junction structures are normal.  The pituitary gland and brainstem are normal. There is some fullness of the nasal mucosa. There is some trace fluid in the left mastoid air cells. 1. No acute findings. 2. Mild severity chronic small vessel ischemic changes. **This report has been created using voice recognition software. It may contain minor errors which are inherent in voice recognition technology. ** Final report electronically signed by Dr. Reid Salazar on 9/8/2021 11:48 AM        Assessment and Plan            1. 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 Nicolas Gallego and he is in agreement with the assessment and plan.   Electronically signed by Karin Schultz PA-C on 9/10/21 at 2:42 PM EDT

## 2021-09-11 LAB
ANION GAP SERPL CALCULATED.3IONS-SCNC: 11 MEQ/L (ref 8–16)
BASOPHILS # BLD: 0.3 %
BASOPHILS ABSOLUTE: 0 THOU/MM3 (ref 0–0.1)
BUN BLDV-MCNC: 19 MG/DL (ref 7–22)
CALCIUM SERPL-MCNC: 9.3 MG/DL (ref 8.5–10.5)
CHLORIDE BLD-SCNC: 105 MEQ/L (ref 98–111)
CO2: 24 MEQ/L (ref 23–33)
CREAT SERPL-MCNC: 1.9 MG/DL (ref 0.4–1.2)
EOSINOPHIL # BLD: 0.9 %
EOSINOPHILS ABSOLUTE: 0.1 THOU/MM3 (ref 0–0.4)
ERYTHROCYTE [DISTWIDTH] IN BLOOD BY AUTOMATED COUNT: 14.3 % (ref 11.5–14.5)
ERYTHROCYTE [DISTWIDTH] IN BLOOD BY AUTOMATED COUNT: 46.2 FL (ref 35–45)
GFR SERPL CREATININE-BSD FRML MDRD: 34 ML/MIN/1.73M2
GLUCOSE BLD-MCNC: 161 MG/DL (ref 70–108)
GRAM STAIN RESULT: NORMAL
HCT VFR BLD CALC: 42.9 % (ref 42–52)
HEMOGLOBIN: 13.9 GM/DL (ref 14–18)
IMMATURE GRANS (ABS): 0.04 THOU/MM3 (ref 0–0.07)
IMMATURE GRANULOCYTES: 0.4 %
LYMPHOCYTES # BLD: 10.6 %
LYMPHOCYTES ABSOLUTE: 1 THOU/MM3 (ref 1–4.8)
MAGNESIUM: 2 MG/DL (ref 1.6–2.4)
MCH RBC QN AUTO: 28.8 PG (ref 26–33)
MCHC RBC AUTO-ENTMCNC: 32.4 GM/DL (ref 32.2–35.5)
MCV RBC AUTO: 89 FL (ref 80–94)
MONOCYTES # BLD: 9.2 %
MONOCYTES ABSOLUTE: 0.9 THOU/MM3 (ref 0.4–1.3)
NUCLEATED RED BLOOD CELLS: 0 /100 WBC
PHOSPHORUS: 3.1 MG/DL (ref 2.4–4.7)
PLATELET # BLD: 202 THOU/MM3 (ref 130–400)
PMV BLD AUTO: 8.8 FL (ref 9.4–12.4)
POTASSIUM REFLEX MAGNESIUM: 3.2 MEQ/L (ref 3.5–5.2)
POTASSIUM SERPL-SCNC: 3.7 MEQ/L (ref 3.5–5.2)
RBC # BLD: 4.82 MILL/MM3 (ref 4.7–6.1)
RESPIRATORY CULTURE: NORMAL
SEG NEUTROPHILS: 78.6 %
SEGMENTED NEUTROPHILS ABSOLUTE COUNT: 7.6 THOU/MM3 (ref 1.8–7.7)
SODIUM BLD-SCNC: 139 MEQ/L (ref 135–145)
SODIUM BLD-SCNC: 140 MEQ/L (ref 135–145)
WBC # BLD: 9.7 THOU/MM3 (ref 4.8–10.8)

## 2021-09-11 PROCEDURE — 84295 ASSAY OF SERUM SODIUM: CPT

## 2021-09-11 PROCEDURE — 99233 SBSQ HOSP IP/OBS HIGH 50: CPT | Performed by: INTERNAL MEDICINE

## 2021-09-11 PROCEDURE — 6360000002 HC RX W HCPCS: Performed by: PHYSICIAN ASSISTANT

## 2021-09-11 PROCEDURE — 2580000003 HC RX 258: Performed by: PHYSICIAN ASSISTANT

## 2021-09-11 PROCEDURE — 84100 ASSAY OF PHOSPHORUS: CPT

## 2021-09-11 PROCEDURE — 84132 ASSAY OF SERUM POTASSIUM: CPT

## 2021-09-11 PROCEDURE — 6370000000 HC RX 637 (ALT 250 FOR IP): Performed by: FAMILY MEDICINE

## 2021-09-11 PROCEDURE — APPSS180 APP SPLIT SHARED TIME > 60 MINUTES: Performed by: NURSE PRACTITIONER

## 2021-09-11 PROCEDURE — 94761 N-INVAS EAR/PLS OXIMETRY MLT: CPT

## 2021-09-11 PROCEDURE — 2580000003 HC RX 258: Performed by: STUDENT IN AN ORGANIZED HEALTH CARE EDUCATION/TRAINING PROGRAM

## 2021-09-11 PROCEDURE — 85025 COMPLETE CBC W/AUTO DIFF WBC: CPT

## 2021-09-11 PROCEDURE — 6360000002 HC RX W HCPCS: Performed by: INTERNAL MEDICINE

## 2021-09-11 PROCEDURE — 80048 BASIC METABOLIC PNL TOTAL CA: CPT

## 2021-09-11 PROCEDURE — 83735 ASSAY OF MAGNESIUM: CPT

## 2021-09-11 PROCEDURE — 36415 COLL VENOUS BLD VENIPUNCTURE: CPT

## 2021-09-11 PROCEDURE — 2000000000 HC ICU R&B

## 2021-09-11 PROCEDURE — 2500000003 HC RX 250 WO HCPCS: Performed by: STUDENT IN AN ORGANIZED HEALTH CARE EDUCATION/TRAINING PROGRAM

## 2021-09-11 PROCEDURE — 6360000002 HC RX W HCPCS: Performed by: STUDENT IN AN ORGANIZED HEALTH CARE EDUCATION/TRAINING PROGRAM

## 2021-09-11 PROCEDURE — 94003 VENT MGMT INPAT SUBQ DAY: CPT

## 2021-09-11 PROCEDURE — 99291 CRITICAL CARE FIRST HOUR: CPT | Performed by: SURGERY

## 2021-09-11 PROCEDURE — 2580000003 HC RX 258: Performed by: INTERNAL MEDICINE

## 2021-09-11 PROCEDURE — 2580000003 HC RX 258: Performed by: FAMILY MEDICINE

## 2021-09-11 RX ORDER — POTASSIUM CHLORIDE 29.8 MG/ML
20 INJECTION INTRAVENOUS
Status: COMPLETED | OUTPATIENT
Start: 2021-09-11 | End: 2021-09-11

## 2021-09-11 RX ADMIN — LEVETIRACETAM 1000 MG: 100 INJECTION, SOLUTION INTRAVENOUS at 21:49

## 2021-09-11 RX ADMIN — DEXTROSE MONOHYDRATE 2.5 MG/HR: 50 INJECTION, SOLUTION INTRAVENOUS at 05:50

## 2021-09-11 RX ADMIN — PREDNISOLONE ACETATE 1 DROP: 10 SUSPENSION/ DROPS OPHTHALMIC at 05:37

## 2021-09-11 RX ADMIN — Medication 50 MG: at 11:14

## 2021-09-11 RX ADMIN — SODIUM CHLORIDE, PRESERVATIVE FREE 10 ML: 5 INJECTION INTRAVENOUS at 20:49

## 2021-09-11 RX ADMIN — PROPOFOL 10 ML/HR: 10 INJECTION, EMULSION INTRAVENOUS at 21:48

## 2021-09-11 RX ADMIN — DEXTROSE MONOHYDRATE 125 ML/HR: 50 INJECTION, SOLUTION INTRAVENOUS at 05:42

## 2021-09-11 RX ADMIN — PREDNISOLONE ACETATE 1 DROP: 10 SUSPENSION/ DROPS OPHTHALMIC at 11:16

## 2021-09-11 RX ADMIN — Medication 1000 UNITS: at 17:19

## 2021-09-11 RX ADMIN — Medication 200 MG: at 11:14

## 2021-09-11 RX ADMIN — CALCIUM 500 MG: 500 TABLET ORAL at 11:14

## 2021-09-11 RX ADMIN — GABAPENTIN 300 MG: 300 CAPSULE ORAL at 20:47

## 2021-09-11 RX ADMIN — CALCIUM 500 MG: 500 TABLET ORAL at 20:48

## 2021-09-11 RX ADMIN — POTASSIUM CHLORIDE 20 MEQ: 400 INJECTION, SOLUTION INTRAVENOUS at 14:11

## 2021-09-11 RX ADMIN — DEXTROSE MONOHYDRATE 2.5 MG/HR: 50 INJECTION, SOLUTION INTRAVENOUS at 16:01

## 2021-09-11 RX ADMIN — PREDNISOLONE ACETATE 1 DROP: 10 SUSPENSION/ DROPS OPHTHALMIC at 23:23

## 2021-09-11 RX ADMIN — ACETAMINOPHEN 650 MG: 325 TABLET ORAL at 20:47

## 2021-09-11 RX ADMIN — POTASSIUM CHLORIDE 20 MEQ: 400 INJECTION, SOLUTION INTRAVENOUS at 13:27

## 2021-09-11 RX ADMIN — DEXTROSE MONOHYDRATE 5 MG/HR: 50 INJECTION, SOLUTION INTRAVENOUS at 00:57

## 2021-09-11 RX ADMIN — DEXTROSE MONOHYDRATE: 50 INJECTION, SOLUTION INTRAVENOUS at 14:46

## 2021-09-11 RX ADMIN — SODIUM CHLORIDE, PRESERVATIVE FREE 10 ML: 5 INJECTION INTRAVENOUS at 11:14

## 2021-09-11 RX ADMIN — ACYCLOVIR SODIUM 330 MG: 500 INJECTION, SOLUTION INTRAVENOUS at 11:37

## 2021-09-11 RX ADMIN — LEVETIRACETAM 1000 MG: 100 INJECTION, SOLUTION INTRAVENOUS at 11:13

## 2021-09-11 RX ADMIN — PREDNISOLONE ACETATE 1 DROP: 10 SUSPENSION/ DROPS OPHTHALMIC at 00:56

## 2021-09-11 ASSESSMENT — PAIN SCALES - GENERAL
PAINLEVEL_OUTOF10: 0
PAINLEVEL_OUTOF10: 5
PAINLEVEL_OUTOF10: 0

## 2021-09-11 ASSESSMENT — PAIN SCALES - WONG BAKER: WONGBAKER_NUMERICALRESPONSE: 0

## 2021-09-11 ASSESSMENT — PULMONARY FUNCTION TESTS
PIF_VALUE: 16
PIF_VALUE: 16

## 2021-09-11 NOTE — FLOWSHEET NOTE
09/11/21 1039   Encounter Summary   Services provided to: Patient   Referral/Consult From: [facility]   Support System Spouse; Children   Continue Visiting Yes  (9/11 NR)   Complexity of Encounter Low   Length of Encounter 15 minutes   Routine   Type Follow up   Assessment Unable to respond   Intervention Prayer   In my encounter with the [de-identified]  yr old patient, I attempted to see the patient, but they were unresponsive at this time. No family was present in the room. I offered a prayer at the pts side. A  will attempt to see the patient at a later time as a follow up. The pt was admitted due to herpes zoster ophthalmicus.

## 2021-09-11 NOTE — PROGRESS NOTES
Neurology Progress Note    Date:9/11/2021       BTTR:4S-71/702-T  Patient Name:Miah Blank     YOB: 1940     Age:81 y.o.    CC:   Chief Complaint   Patient presents with    Eye Pain        Subjective      Vj Edgar is an [de-identified] M with PMH of HTN who presented to [facility] with painful lesions, facial swelling since and R eye drainage and photophobia which began to develop on 9/4/21. History of shingles unknown, no hx of shingles immunization. No additional history available due to the condition of the patient. 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 His acyclovir is currently being held due to concerns regarding his kidney function. Overnight, his nurse noted some myoclonic jerking in his upper extremities which went on to affect his lower extremities. He is not currently on any antiepileptic medications but was on propofol and Versed at the time. Interval history 9/10/2021:  Acyclovir held yesterday d/t DEANDRE. Plan to resume today per nephro and ID. Dialysis cath placed yesterday. Underwent dialysis last night. No witnessed seizure activity since starting the keppra. Interval History 9/11/2021:  Patient on acyclovir. No new seizure activity with loading dose of keprra 1000mg and malignance dose increase of 1000mg BID. He is off sedation, withdrawing to pain, not following commands at this time.     Review of Systems   Review of Systems   Unable to perform ROS: Intubated       Medications   Scheduled Meds:    acyclovir  5 mg/kg (Ideal) IntraVENous Q24H    LORazepam  2 mg IntraVENous Once    levetiracetam  1,000 mg IntraVENous Q12H    midazolam  2 mg IntraVENous Once    prednisoLONE acetate  1 drop Right Eye 4 times per day    [Held by provider] QUEtiapine  25 mg Oral BID    [Held by provider] baclofen  20 mg Oral TID    calcium elemental  500 mg Oral BID    [Held by provider] lisinopril  20 mg Oral Daily    [Held by provider] magnesium oxide  400 mg Oral 4x Daily AC & HS    vitamin B-6  200 mg Oral Daily    [Held by provider] tamsulosin  0.4 mg Oral Daily    Vitamin D  1,000 Units Oral BID AC    zinc sulfate  50 mg Oral Daily    sodium chloride flush  5-40 mL IntraVENous 2 times per day     Continuous Infusions:    niCARdipine 2.5 mg/hr (21 0550)    dextrose 125 mL/hr (21 0542)    propofol 10 mcg/kg/min (09/10/21 1511)    sodium chloride       PRN Meds: sodium chloride flush, sodium chloride, ondansetron **OR** ondansetron, polyethylene glycol, acetaminophen **OR** acetaminophen, gabapentin, morphine    Past History    Past Medical History:   has a past medical history of Hypertension. Social History:   reports that he quit smoking about 51 years ago. His smoking use included cigarettes. He has a 10.00 pack-year smoking history. He has never used smokeless tobacco. He reports that he does not drink alcohol and does not use drugs. Family History:   Family History   Problem Relation Age of Onset    High Blood Pressure Mother     No Known Problems Father        Physical Examination      Vitals:  /89   Pulse 89   Temp 99 °F (37.2 °C) (Oral)   Resp 16   Ht 5' 7\" (1.702 m)   Wt 213 lb 10 oz (96.9 kg)   SpO2 97%   BMI 33.46 kg/m²   Temp (24hrs), Av.1 °F (37.3 °C), Min:98.7 °F (37.1 °C), Max:99.4 °F (37.4 °C)      I/O (24Hr): Intake/Output Summary (Last 24 hours) at 2021 0705  Last data filed at 2021 0600  Gross per 24 hour   Intake 6083 ml   Output 8550 ml   Net -2467 ml       Physical Exam  Vitals reviewed. Constitutional:       Appearance: He is ill-appearing. HENT:      Head: Normocephalic and atraumatic. Right Ear: External ear normal.      Left Ear: External ear normal.      Mouth/Throat:      Mouth: Mucous membranes are moist.   Eyes:      General:         Right eye: Discharge present.          Left eye: Discharge present. Pupils: Pupils are equal, round, and reactive to light. Comments: Conjunctiva injected bilaterally   Cardiovascular:      Rate and Rhythm: Normal rate and regular rhythm. Heart sounds: Normal heart sounds. No murmur heard. Pulmonary:      Breath sounds: Rhonchi present. Skin:     Findings: Rash present. Comments: Vesicular rash in right V1 distribution. Starting to crust over   Neurological:      Deep Tendon Reflexes:      Reflex Scores:       Patellar reflexes are 2+ on the right side and 2+ on the left side. Achilles reflexes are 2+ on the right side and 2+ on the left side. Neurologic Exam     Mental Status   Level of consciousness: responsive to painful stimuli  Mental status exam limited due to intubation     Cranial Nerves     CN III, IV, VI   Pupils are equal, round, and reactive to light. Right pupil: Size: 2 mm. Reactivity: sluggish. Left pupil: Size: 2 mm. Reactivity: sluggish. CN exam limited due to intubation. Motor Exam will withdraw his lower and upper extremities to noxious stimuli. Sensory Exam   Not assessed due to intubation     Gait, Coordination, and Reflexes     Reflexes   Right patellar: 2+  Left patellar: 2+  Right achilles: 2+  Left achilles: 2+  Gait and coordination deferred        Labs/Imaging/Diagnostics   Labs:  CBC:  Recent Labs     09/09/21  0545 09/10/21  1030 09/11/21  0413   WBC 8.5 8.3 9.7   RBC 4.70 4.64* 4.82   HGB 13.6* 13.7* 13.9*   HCT 43.1 41.5* 42.9   MCV 91.7 89.4 89.0    154 202     CHEMISTRIES:  Recent Labs     09/09/21  0545 09/09/21  0545 09/10/21  1030 09/10/21  2045 09/11/21  0413      < > 147* 145 140   K 4.7   < > 3.8 3.9 3.2*      < > 113* 110 105   CO2 19*   < > 22* 21* 24   BUN 41*   < > 24* 22 19   CREATININE 5.5*   < > 2.9* 2.3* 1.9*   GLUCOSE 106   < > 119* 144* 161*   PHOS 5.6*  --   --   --  3.1   MG 2.5*  --   --   --  2.0    < > = values in this interval not displayed. PT/INR:No results for input(s): PROTIME, INR in the last 72 hours. APTT:No results for input(s): APTT in the last 72 hours. LIVER PROFILE:  No results for input(s): AST, ALT, BILIDIR, BILITOT, ALKPHOS in the last 72 hours. Imaging Last 24 Hours:  CT HEAD WO CONTRAST    Result Date: 9/7/2021  Exam: CT brain without contrast Comparison: None Clinical history: Acute mental status change Findings: The ventricles are normal in size and position. No intracranial masses or midline shift identified. No abnormal extra-axial fluid collections are seen. No acute intracranial hemorrhage. The visualized paranasal sinuses are clear. No skull fracture identified. Nonspecific tissue swelling at the right forehead and in the right malar region. Impression: 1. No acute intracranial hemorrhage or process identified. 2. Nonspecific tissue swelling at the right forehead and in the right malar region. This document has been electronically signed by: John Diallo MD on 09/07/2021 11:02 PM All CTs at this facility use dose modulation techniques and iterative reconstructions, and/or weight-based dosing when appropriate to reduce radiation to a low as reasonably achievable. US RENAL COMPLETE    Result Date: 9/8/2021  BILATERAL RENAL ULTRASOUND: CLINICAL INFORMATION: Renal failure COMPARISON: No comparison available. TECHNIQUE: Multiple sonographic images of both kidneys were obtained. Images of the urinary bladder were also obtained. FINDINGS: RIGHT KIDNEY - 13.0 x 6.6 x 6.0 cm Resistive Index - 0.63 Cortical Thickness - 1.2 cm LEFT KIDNEY - 11.5 x 5.1 x 5.4 cm Resistive Index - 0.62 Cortical Thickness - 1.2 cm URINARY BLADDER catheter  KIDNEYS:  Both kidneys are normal in size and contour. The resistive indices are normal.  MASS/CYST: No solid or cystic lesion of either kidney is seen. HYDRONEPHROSIS:  There is no hydronephrosis. CALCULI:  No calculi are seen.  BLADDER:  Urinary bladder empty with Hampton catheter in place. Moderate amount of sludge in the gallbladder. .     Normal renal ultrasound **This report has been created using voice recognition software. It may contain minor errors which are inherent in voice recognition technology. ** Final report electronically signed by Dr. Diane Mohamud on 9/8/2021 4:08 PM    XR CHEST PORTABLE    Result Date: 9/8/2021  PROCEDURE: XR CHEST PORTABLE CLINICAL INFORMATION: 81-year-old male who underwent endotracheal and nasogastric tube placement. COMPARISON: Chest x-ray 09/08/2021. TECHNIQUE: 3 AP semiupright views of the chest were obtained. FINDINGS: There is a nasogastric tube coursing along the route of the esophagus and terminating in the stomach. There is a nasogastric tube approximately 6.5 cm from the stan. The cardiac silhouette and pulmonary vasculature are within normal limits. There is no significant pleural effusion or pneumothorax. Visualized portions of the upper abdomen are within normal limits. The osseous structures are intact. No acute fractures or suspicious osseous lesions. Interval placement of an endotracheal and nasogastric tube. Otherwise stable appearance of the chest when compared to the prior study. **This report has been created using voice recognition software. It may contain minor errors which are inherent in voice recognition technology. ** Final report electronically signed by Dr Chiara Viveros on 9/8/2021 4:21 PM    XR CHEST PORTABLE    Result Date: 9/8/2021  PROCEDURE: XR CHEST PORTABLE CLINICAL INFORMATION: rhonci and obtunded. COMPARISON: No prior study. TECHNIQUE: AP upright view of the chest. FINDINGS: The heart size is at the upper limits of normal.The mediastinum is not widened. There are no pulmonary infiltrates or effusions. The pulmonary vascularity is normal. No suspicious osseous lesions are present. No acute cardiopulmonary process. **This report has been created using voice recognition software.  It may contain minor errors which are inherent in voice recognition technology. ** Final report electronically signed by Dr. Manpreet Hastings on 9/8/2021 7:56 AM    MRI BRAIN WO CONTRAST    Result Date: 9/8/2021  PROCEDURE: MRI BRAIN WO CONTRAST CLINICAL INFORMATION HCV. Unresponsive. HSV on the right forehead. Assess for HSV encephalitis. COMPARISON: Head CT 9/7/2021. TECHNIQUE: Multiplanar and multiple spin echo MRI images were obtained of the brain without contrast. FINDINGS: The diffusion-weighted images are normal.  The brain volume is mildly reduced. There is a mild amount of signal hyperintensity on the FLAIR and T2-weighted sequences in the white matter of the brain. This is consistent with mild severity chronic small vessel ischemic changes. There is no abnormal signal in the temporal lobes. There is a small area of old infarct or volume loss in the periphery of the left cerebellum. There are no intra-or extra-axial collections. There is no hydrocephalus, midline shift or mass effect. There is mineralization in the medial aspects of the basal ganglia bilaterally. The major intracranial vascular flow voids are present. The midline craniocervical junction structures are normal.  The pituitary gland and brainstem are normal. There is some fullness of the nasal mucosa. There is some trace fluid in the left mastoid air cells. 1. No acute findings. 2. Mild severity chronic small vessel ischemic changes. **This report has been created using voice recognition software. It may contain minor errors which are inherent in voice recognition technology. ** Final report electronically signed by Dr. Manpreet Hastings on 9/8/2021 11:48 AM        Assessment and Plan            1. 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 · Sedation off- neurology will follow      Patient seen and evaluated with Dr. Justina Zavaleta and he is in agreement with the assessment and plan.     Electronically signed by Juju Jones CNP on 9/11/21 at 1:46 PM EDT

## 2021-09-11 NOTE — PROGRESS NOTES
[facility] CRITICAL CARE PROGRESS NOTE      Patient:  Rao Parkinson    Unit/Bed:4D-17/017-A  YOB: 1940  MRN: [id number]   PCP: Andres Saunders  Date of Admission: 9/6/2021  Chief Complaint:- right eye pain and swelling     Assessment and Plan:      1. Acute Hypoxic Respiratory Failure: Secondary to Infection. Patient also was very altered and concern for protecting his own airway. Currently requiring minimal pressure support and peep. Lung protective strategies by keeping Peak pressure <35 and Plateau Pressure <40. Patient is off sedation however is currently only responding to painful stimuli. 2. 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 CT of the head showed no acute intracranial hemorrhage or process in the brain. MRI of the brain shows no acute findings and mild severity chronic small vessel ischemic changes. LP performed on 9/8 positive for +Varicella-Zoster. resumed acyclovir today. Continues to be encephalopathic. 3. Varicella-Zoster ophthalmicus: Rash is on the right side of the face/head in the V1 distribution. No history of VZV vaccine. Patient saw ophthalmology and recommends prednisone eyedrops every 6 hours until next appointment 9/20. Resumed acyclovir per ID recommendations. 4. Acute renal failure: Worsening. Secondary to Acyclovir. Developed DEANDRE on 9/7 and patient was started on normal saline 125 mL/h continuous. All nephrotoxic agents are held. Patient is still making urine. Avoid further hypotension. Renally dosed meds. S/p hemo dialysis   5. Seizure:  No hx of seizures. Neurology notified. EEG showed periodic lateralized discharges epileptogenic in nature indicates seizure tendency in proper clinical context. On Keppra 1 g twice daily IV daily. Neurology following   6. Enterococcus group D UTI:  Urine culture grew greater than 100,000 CFU. Discontinued by infectious disease  7. Hypotension:  Secondary to infection. Lisinopril on hold due to DEANDRE  8.  BPH: hold Flomax due to Hypotension  9. HTN: lisinopril held secondary to DEANDRE      INITIAL H AND P AND ICU COURSE:  Ezequiel Gonzalez is an 80-year-old white male who presented to [facility] on 9/6/2021 with complaints of right eye swelling and pain. He has a past medical history of reformed smoker hypertension and BPH. Per report patient had been having eye swelling for the past 2 to 3 days. He tried Motrin and applied warm clots. He found mild relief. He did have some photophobia and a small rash. This continued to worsen and began to itch. He came to the ER on 9/5 was found to have corneal abrasions and bacterial conjunctivitis. He was given gentamicin drops for his eyes and was told to follow-up with Dr. Rama Shone. He returned to the ER with worsening symptoms. He was started on IV acyclovir with concern for herpes zoster ophthalmicus. He was initially admitted to stepdown. Patient then became hypotensive on stepdown on 9/7. He was a rapid response. CT head was obtained due to altered mental status with no changes. On 9/8 there was a CODE BLUE. Patient was intubated. He was altered and hypoxic. He was transferred to ICU for further management. Spinal tap was completed which was positive for HSV. Past Medical History: Per HPI  Family History: Mother: Hypertension  Social History: Reformed smoker, denies alcohol use, denies drug use.     ROS   Intubated and sedated on mechanical ventilation    Scheduled Meds:   acyclovir  5 mg/kg (Ideal) IntraVENous Q24H    LORazepam  2 mg IntraVENous Once    levetiracetam  1,000 mg IntraVENous Q12H    midazolam  2 mg IntraVENous Once    prednisoLONE acetate  1 drop Right Eye 4 times per day    [Held by provider] QUEtiapine  25 mg Oral BID    [Held by provider] baclofen  20 mg Oral TID    calcium elemental  500 mg Oral BID    [Held by provider] lisinopril  20 mg Oral Daily    [Held by provider] magnesium oxide  400 mg Oral 4x Daily AC & HS    vitamin B-6  200 mg Oral Daily    [Held by provider] tamsulosin  0.4 mg Oral Daily    Vitamin D  1,000 Units Oral BID AC    zinc sulfate  50 mg Oral Daily    sodium chloride flush  5-40 mL IntraVENous 2 times per day     Continuous Infusions:   niCARdipine 2.5 mg/hr (09/11/21 1601)    dextrose 125 mL/hr at 09/11/21 1446    propofol 10 mcg/kg/min (09/10/21 1511)    sodium chloride         PHYSICAL EXAMINATION:  T:  98.7. P:  93. RR:  16. B/P:  136/99. FiO2:  30. O2 Sat:  97.  I/O:  1371/750  Body mass index is 33.46 kg/m². PC: 6/10: TV: 498: RRTotal: 19: Ti:1 sec:  General: Acute on chronically ill-appearing white male  HEENT:  normocephalic and atraumatic. No scleral icterus. PERR crusting to the eye. Neck: supple. No Thyromegaly. Lungs: Diminished to auscultation. No retractions  Cardiac: RRR. No JVD. Abdomen: soft. Nontender. Extremities:  No clubbing, cyanosis, or edema x 4. Vasculature: capillary refill < 3 seconds. Palpable dorsalis pedis pulses. Skin:  warm and dry. Psych: Withdraws from pain follows no commands  Lymph:  No supraclavicular adenopathy. Neurologic:  No focal deficit. No seizures. Data: (All radiographs, tracings, PFTs, and imaging are personally viewed and interpreted unless otherwise noted).  Sodium 140, potassium 3.2, chloride 105, CO2 24, BUN 19, creatinine 1.9, anion gap 11.0, glucose 161.  WBC 9.7, hemoglobin 13.9, hematocrit 42.9, platelet count 439   Telemetry shows normal sinus rhythm   Spinal fluid 9/8/2021 positive for HSV   CT head 9/5/2021 reports no evidence of acute intercranial abnormalities   CT facial bones 9/5/2021 reports no evidence of acute intercranial abnormalities   CT cervical spine 9/5/2021 reports no evidence of acute osseous injury of the cervical spine   CT head 9/7/2021 reports no acute intercranial hemorrhage or process.  Chest x-ray 9/9/2021 reports mild atelectasis pneumonia both lungs.    MRI 9/8/2021 reports no acute findings   Renal ultrasound 9/8/2021 reports normal ultrasound of kidneys        Meets Continued ICU Level Care Criteria:    [x] Yes   [] No - Transfer Planned to listed location:  [] HOSPITALIST CONTACTED-      Case and plan discussed with Dr. Misha Lou. Electronically signed by Theodora Schilling. BRONWYN Thakkar - [facility]  CRITICAL CARE SPECIALIST    Patient seen by me. Case discussed with NP, Clementina     Remains intubated, acyclovir restarted, remains encephalopathic,. CC time 35 minutes. Time was discontiguous. Time does not include procedures. Time does include my direct assessment of the patient and coordination of care.

## 2021-09-11 NOTE — PLAN OF CARE
Problem: RESPIRATORY  Goal: Will be able to breathe spontaneously, without ventilator support  Description: Will be able to breathe spontaneously, without ventilator support  Outcome: Not Met This Shift  Note: Vent setting optimized to achieve target tidal volume, respiratory rate and ideal oxygen saturations. Patient placed on CPAP for trial even though not following commands. Patient not on any sedation. Patient tolerated CPAP all shift without any adverse reaction noted. Patient remains on CPAP, however still not following commands. Will not be attempting extubation today. Patient Weaning Progress    The patient's vent settings was able to be weaned this shift. Ventilator settings that were weaned              [x] Mode   [] Pressure support weaned   [] Fio2 weaned   [] Peep weaned             Spontaneous weaning trial  was attempted. Evac tube was  hooked up with continuous low suction(20-30mmHg)      Cuff was not  deflated to determine cuff leak.         *Specific details of weaning located in Ventilator documentation flowsheets*

## 2021-09-11 NOTE — PROGRESS NOTES
[facility]   Nephrology progress note  9/11/2021, 12:18 PM      Pt Name:    Carol Elias  MRN:     [id number]     YOB: 1940  Admit Date:    9/6/2021 11:26 AM  Primary Care Physician:  Janki Barraza   Room number  4D-17/017-A    Chief Complaint: Nephrology following for DEANDRE    Subjective:  Patient seen and examined  Excellent urine output  Family members in the room  On 30% FiO2, PEEP of 6  On D5W at 125 mL/h    Objective:  24HR INTAKE/OUTPUT:      Intake/Output Summary (Last 24 hours) at 9/11/2021 1218  Last data filed at 9/11/2021 0600  Gross per 24 hour   Intake 6083 ml   Output 8550 ml   Net -2467 ml     I/O last 3 completed shifts: In: 6083 [I.V.:4492; NG/GT:1591]  Out: 8550 [Urine:8550]  No intake/output data recorded. Admission weight: 210 lb (95.3 kg)  Wt Readings from Last 3 Encounters:   09/11/21 213 lb 10 oz (96.9 kg)   09/05/21 210 lb (95.3 kg)   03/19/19 207 lb 3.2 oz (94 kg)     Body mass index is 33.46 kg/m².     Physical examination  VITALS:     Vitals:    09/11/21 0900 09/11/21 0930 09/11/21 1000 09/11/21 1100   BP:  (!) 142/96 (!) 135/98 (!) 144/106   Pulse: 85 91 88 90   Resp: 12 16 20 13   Temp:  97.9 °F (36.6 °C)     TempSrc:  Oral     SpO2: 97%  97% 99%   Weight:       Height:         General Appearance: Patient is intubated on mechanical ventilator  Mouth/Throat: ET tube present  Neck: No JVD noted  Lungs: Air entry diminished  GI: soft, non-tender, no guarding  Extremities: No significant pitting LE edema      Lab Data  CBC:   Recent Labs     09/09/21  0545 09/10/21  1030 09/11/21  0413   WBC 8.5 8.3 9.7   HGB 13.6* 13.7* 13.9*   HCT 43.1 41.5* 42.9    154 202     BMP:  Recent Labs     09/09/21  0545 09/09/21  0545 09/10/21  1030 09/10/21  2045 09/11/21  0413      < > 147* 145 140   K 4.7   < > 3.8 3.9 3.2*      < > 113* 110 105   CO2 19*   < > 22* 21* 24   BUN 41*   < > 24* 22 19   CREATININE 5.5*   < > 2.9* 2.3* 1.9*   GLUCOSE 106   < Stephanie Wilkins MD  [facility]

## 2021-09-12 ENCOUNTER — APPOINTMENT (OUTPATIENT)
Dept: GENERAL RADIOLOGY | Age: 81
DRG: 124 | End: 2021-09-12
Payer: MEDICARE

## 2021-09-12 LAB
ANION GAP SERPL CALCULATED.3IONS-SCNC: 12 MEQ/L (ref 8–16)
BASOPHILS # BLD: 0.3 %
BASOPHILS ABSOLUTE: 0 THOU/MM3 (ref 0–0.1)
BUN BLDV-MCNC: 18 MG/DL (ref 7–22)
CALCIUM SERPL-MCNC: 8.9 MG/DL (ref 8.5–10.5)
CHLORIDE BLD-SCNC: 98 MEQ/L (ref 98–111)
CO2: 24 MEQ/L (ref 23–33)
CREAT SERPL-MCNC: 1.4 MG/DL (ref 0.4–1.2)
EOSINOPHIL # BLD: 1.9 %
EOSINOPHILS ABSOLUTE: 0.2 THOU/MM3 (ref 0–0.4)
ERYTHROCYTE [DISTWIDTH] IN BLOOD BY AUTOMATED COUNT: 14.1 % (ref 11.5–14.5)
ERYTHROCYTE [DISTWIDTH] IN BLOOD BY AUTOMATED COUNT: 45.3 FL (ref 35–45)
GFR SERPL CREATININE-BSD FRML MDRD: 49 ML/MIN/1.73M2
GLUCOSE BLD-MCNC: 127 MG/DL (ref 70–108)
HCT VFR BLD CALC: 40.1 % (ref 42–52)
HEMOGLOBIN: 13.2 GM/DL (ref 14–18)
IMMATURE GRANS (ABS): 0.12 THOU/MM3 (ref 0–0.07)
IMMATURE GRANULOCYTES: 1 %
LYMPHOCYTES # BLD: 12.6 %
LYMPHOCYTES ABSOLUTE: 1.5 THOU/MM3 (ref 1–4.8)
MAGNESIUM: 1.9 MG/DL (ref 1.6–2.4)
MCH RBC QN AUTO: 29.2 PG (ref 26–33)
MCHC RBC AUTO-ENTMCNC: 32.9 GM/DL (ref 32.2–35.5)
MCV RBC AUTO: 88.7 FL (ref 80–94)
MONOCYTES # BLD: 7.2 %
MONOCYTES ABSOLUTE: 0.9 THOU/MM3 (ref 0.4–1.3)
NUCLEATED RED BLOOD CELLS: 0 /100 WBC
PLATELET # BLD: 214 THOU/MM3 (ref 130–400)
PMV BLD AUTO: 8.8 FL (ref 9.4–12.4)
POTASSIUM REFLEX MAGNESIUM: 3.3 MEQ/L (ref 3.5–5.2)
POTASSIUM SERPL-SCNC: 3.6 MEQ/L (ref 3.5–5.2)
RBC # BLD: 4.52 MILL/MM3 (ref 4.7–6.1)
SEG NEUTROPHILS: 77 %
SEGMENTED NEUTROPHILS ABSOLUTE COUNT: 9.5 THOU/MM3 (ref 1.8–7.7)
SODIUM BLD-SCNC: 134 MEQ/L (ref 135–145)
WBC # BLD: 12.3 THOU/MM3 (ref 4.8–10.8)

## 2021-09-12 PROCEDURE — 99232 SBSQ HOSP IP/OBS MODERATE 35: CPT | Performed by: INTERNAL MEDICINE

## 2021-09-12 PROCEDURE — 2580000003 HC RX 258: Performed by: INTERNAL MEDICINE

## 2021-09-12 PROCEDURE — 6360000002 HC RX W HCPCS: Performed by: INTERNAL MEDICINE

## 2021-09-12 PROCEDURE — 2500000003 HC RX 250 WO HCPCS: Performed by: STUDENT IN AN ORGANIZED HEALTH CARE EDUCATION/TRAINING PROGRAM

## 2021-09-12 PROCEDURE — 2580000003 HC RX 258: Performed by: PHYSICIAN ASSISTANT

## 2021-09-12 PROCEDURE — 83735 ASSAY OF MAGNESIUM: CPT

## 2021-09-12 PROCEDURE — 80048 BASIC METABOLIC PNL TOTAL CA: CPT

## 2021-09-12 PROCEDURE — 6360000002 HC RX W HCPCS: Performed by: PHYSICIAN ASSISTANT

## 2021-09-12 PROCEDURE — 85025 COMPLETE CBC W/AUTO DIFF WBC: CPT

## 2021-09-12 PROCEDURE — 36415 COLL VENOUS BLD VENIPUNCTURE: CPT

## 2021-09-12 PROCEDURE — 99291 CRITICAL CARE FIRST HOUR: CPT | Performed by: SURGERY

## 2021-09-12 PROCEDURE — 2580000003 HC RX 258: Performed by: FAMILY MEDICINE

## 2021-09-12 PROCEDURE — 84132 ASSAY OF SERUM POTASSIUM: CPT

## 2021-09-12 PROCEDURE — 94761 N-INVAS EAR/PLS OXIMETRY MLT: CPT

## 2021-09-12 PROCEDURE — 6370000000 HC RX 637 (ALT 250 FOR IP): Performed by: FAMILY MEDICINE

## 2021-09-12 PROCEDURE — 71045 X-RAY EXAM CHEST 1 VIEW: CPT

## 2021-09-12 PROCEDURE — 2580000003 HC RX 258: Performed by: STUDENT IN AN ORGANIZED HEALTH CARE EDUCATION/TRAINING PROGRAM

## 2021-09-12 PROCEDURE — 6360000002 HC RX W HCPCS

## 2021-09-12 PROCEDURE — 2000000000 HC ICU R&B

## 2021-09-12 PROCEDURE — 94003 VENT MGMT INPAT SUBQ DAY: CPT

## 2021-09-12 RX ORDER — POTASSIUM CHLORIDE 7.45 MG/ML
10 INJECTION INTRAVENOUS PRN
Status: DISCONTINUED | OUTPATIENT
Start: 2021-09-12 | End: 2021-09-12

## 2021-09-12 RX ORDER — SODIUM CHLORIDE 9 MG/ML
INJECTION, SOLUTION INTRAVENOUS CONTINUOUS
Status: DISCONTINUED | OUTPATIENT
Start: 2021-09-12 | End: 2021-09-16

## 2021-09-12 RX ORDER — POTASSIUM CHLORIDE 29.8 MG/ML
20 INJECTION INTRAVENOUS
Status: DISPENSED | OUTPATIENT
Start: 2021-09-12 | End: 2021-09-12

## 2021-09-12 RX ORDER — POTASSIUM CHLORIDE 29.8 MG/ML
INJECTION INTRAVENOUS
Status: COMPLETED
Start: 2021-09-12 | End: 2021-09-12

## 2021-09-12 RX ORDER — POTASSIUM CHLORIDE 20 MEQ/1
40 TABLET, EXTENDED RELEASE ORAL PRN
Status: DISCONTINUED | OUTPATIENT
Start: 2021-09-12 | End: 2021-09-12

## 2021-09-12 RX ADMIN — DEXTROSE MONOHYDRATE 75 ML/HR: 50 INJECTION, SOLUTION INTRAVENOUS at 02:25

## 2021-09-12 RX ADMIN — DEXTROSE MONOHYDRATE 5 MG/HR: 50 INJECTION, SOLUTION INTRAVENOUS at 20:23

## 2021-09-12 RX ADMIN — Medication 200 MG: at 11:31

## 2021-09-12 RX ADMIN — POTASSIUM CHLORIDE 20 MEQ: 29.8 INJECTION, SOLUTION INTRAVENOUS at 13:50

## 2021-09-12 RX ADMIN — CALCIUM 500 MG: 500 TABLET ORAL at 21:16

## 2021-09-12 RX ADMIN — SODIUM CHLORIDE, PRESERVATIVE FREE 10 ML: 5 INJECTION INTRAVENOUS at 11:39

## 2021-09-12 RX ADMIN — ACYCLOVIR SODIUM 650 MG: 50 INJECTION, SOLUTION INTRAVENOUS at 11:37

## 2021-09-12 RX ADMIN — POTASSIUM CHLORIDE 20 MEQ: 29.8 INJECTION, SOLUTION INTRAVENOUS at 12:00

## 2021-09-12 RX ADMIN — DEXTROSE MONOHYDRATE 5 MG/HR: 50 INJECTION, SOLUTION INTRAVENOUS at 12:29

## 2021-09-12 RX ADMIN — LEVETIRACETAM 1000 MG: 100 INJECTION, SOLUTION INTRAVENOUS at 21:16

## 2021-09-12 RX ADMIN — ACYCLOVIR SODIUM 650 MG: 50 INJECTION, SOLUTION INTRAVENOUS at 21:32

## 2021-09-12 RX ADMIN — PREDNISOLONE ACETATE 1 DROP: 10 SUSPENSION/ DROPS OPHTHALMIC at 18:05

## 2021-09-12 RX ADMIN — CALCIUM 500 MG: 500 TABLET ORAL at 11:32

## 2021-09-12 RX ADMIN — DEXTROSE MONOHYDRATE 10 MG/HR: 50 INJECTION, SOLUTION INTRAVENOUS at 15:37

## 2021-09-12 RX ADMIN — SODIUM CHLORIDE: 9 INJECTION, SOLUTION INTRAVENOUS at 12:33

## 2021-09-12 RX ADMIN — LEVETIRACETAM 1000 MG: 100 INJECTION, SOLUTION INTRAVENOUS at 11:31

## 2021-09-12 RX ADMIN — Medication 1000 UNITS: at 18:06

## 2021-09-12 RX ADMIN — Medication 1000 UNITS: at 06:24

## 2021-09-12 RX ADMIN — MORPHINE SULFATE 4 MG: 4 INJECTION, SOLUTION INTRAMUSCULAR; INTRAVENOUS at 21:23

## 2021-09-12 RX ADMIN — PREDNISOLONE ACETATE 1 DROP: 10 SUSPENSION/ DROPS OPHTHALMIC at 11:32

## 2021-09-12 RX ADMIN — Medication 50 MG: at 11:31

## 2021-09-12 RX ADMIN — PREDNISOLONE ACETATE 1 DROP: 10 SUSPENSION/ DROPS OPHTHALMIC at 06:25

## 2021-09-12 ASSESSMENT — PAIN SCALES - GENERAL
PAINLEVEL_OUTOF10: 5
PAINLEVEL_OUTOF10: 7
PAINLEVEL_OUTOF10: 4
PAINLEVEL_OUTOF10: 2
PAINLEVEL_OUTOF10: 5

## 2021-09-12 ASSESSMENT — PULMONARY FUNCTION TESTS
PIF_VALUE: 18
PIF_VALUE: 14
PIF_VALUE: 17
PIF_VALUE: 24
PIF_VALUE: 19

## 2021-09-12 NOTE — PROGRESS NOTES
Neurology Progress Note    Date:9/12/2021       XTAB:[id number]  Patient Name:Miah Blank     YOB: 1940     Age:81 y.o.    CC:   Chief Complaint   Patient presents with    Eye Pain        Subjective      Orlin Damian is an [de-identified] M with PMH of HTN who presented to [facility] with painful lesions, facial swelling since and R eye drainage and photophobia which began to develop on 9/4/21. History of shingles unknown, no hx of shingles immunization. No additional history available due to the condition of the patient. 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 His acyclovir is currently being held due to concerns regarding his kidney function. Overnight, his nurse noted some myoclonic jerking in his upper extremities which went on to affect his lower extremities. He is not currently on any antiepileptic medications but was on propofol and Versed at the time. Interval history 9/10/2021:  Acyclovir held yesterday d/t DEANDRE. Plan to resume today per nephro and ID. Dialysis cath placed yesterday. Underwent dialysis last night. No witnessed seizure activity since starting the keppra. Interval History 9/11/2021:  Patient on acyclovir. No new seizure activity with loading dose of keprra 1000mg and malignance dose increase of 1000mg BID. He is off sedation, withdrawing to pain, not following commands at this time. Interval History 9/12/2021:  No acute event overnight. Patient remains of acyclovir. Per RN last evening they took him off sedation and he was able to squeeze hands, and raise his eyebrows. He was started back on sedation overnight because he was having increased work of breathing. No seizure activity noted overnight.  Sedation was stopped today in rounds and patient is opening eyes, and following commands such as squeezing hands    Review of Systems   Review of Systems filed at 9/12/2021 0600  Gross per 24 hour   Intake 5276 ml   Output 2150 ml   Net 3126 ml       Physical Exam  Vitals reviewed. Constitutional:       Appearance: He is ill-appearing. HENT:      Head: Normocephalic and atraumatic. Right Ear: External ear normal.      Left Ear: External ear normal.      Mouth/Throat:      Mouth: Mucous membranes are moist.   Eyes:      General:         Right eye: Discharge present. Left eye: Discharge present. Pupils: Pupils are equal, round, and reactive to light. Comments: Conjunctiva injected bilaterally   Cardiovascular:      Rate and Rhythm: Normal rate and regular rhythm. Heart sounds: Normal heart sounds. No murmur heard. Pulmonary:      Breath sounds: Rhonchi present. Skin:     Findings: Rash present. Comments: Vesicular rash in right V1 distribution. Starting to crust over   Neurological:      Deep Tendon Reflexes:      Reflex Scores:       Patellar reflexes are 2+ on the right side and 2+ on the left side. Achilles reflexes are 2+ on the right side and 2+ on the left side. Neurologic Exam     Mental Status   Level of consciousness: arousable by tactile stimuli ,  arousable by verbal stimuli  Mental status exam limited due to intubation     Cranial Nerves     CN III, IV, VI   Pupils are equal, round, and reactive to light. Right pupil: Size: 2 mm. Reactivity: sluggish. Left pupil: Size: 2 mm. Reactivity: sluggish. CN exam limited due to intubation. Motor Exam   Moving hands and toes on command.  Motor exam limited due to recent weaning of sedation     Sensory Exam   Not assessed due to intubation     Gait, Coordination, and Reflexes     Reflexes   Right patellar: 2+  Left patellar: 2+  Right achilles: 2+  Left achilles: 2+  Gait and coordination deferred        Labs/Imaging/Diagnostics   Labs:  CBC:  Recent Labs     09/10/21  1030 09/11/21  0413 09/12/21  0446   WBC 8.3 9.7 12.3*   RBC 4.64* 4.82 4.52*   HGB 13.7* 13.9* 13.2*   HCT 41.5* 42.9 40.1*   MCV 89.4 89.0 88.7    202 214     CHEMISTRIES:  Recent Labs     09/10/21  1030 09/10/21  2045 09/11/21  0413 09/11/21  1615 09/12/21  0446   NA   < > 145 140 139 134*   K   < > 3.9 3.2* 3.7 3.3*   CL  --  110 105  --  98   CO2  --  21* 24  --  24   BUN  --  22 19  --  18   CREATININE  --  2.3* 1.9*  --  1.4*   GLUCOSE  --  144* 161*  --  127*   PHOS  --   --  3.1  --   --    MG  --   --  2.0  --  1.9    < > = values in this interval not displayed. PT/INR:No results for input(s): PROTIME, INR in the last 72 hours. APTT:No results for input(s): APTT in the last 72 hours. LIVER PROFILE:  No results for input(s): AST, ALT, BILIDIR, BILITOT, ALKPHOS in the last 72 hours. Imaging Last 24 Hours:  CT HEAD WO CONTRAST    Result Date: 9/7/2021  Exam: CT brain without contrast Comparison: None Clinical history: Acute mental status change Findings: The ventricles are normal in size and position. No intracranial masses or midline shift identified. No abnormal extra-axial fluid collections are seen. No acute intracranial hemorrhage. The visualized paranasal sinuses are clear. No skull fracture identified. Nonspecific tissue swelling at the right forehead and in the right malar region. Impression: 1. No acute intracranial hemorrhage or process identified. 2. Nonspecific tissue swelling at the right forehead and in the right malar region. This document has been electronically signed by: Leanne Howell MD on 09/07/2021 11:02 PM All CTs at this facility use dose modulation techniques and iterative reconstructions, and/or weight-based dosing when appropriate to reduce radiation to a low as reasonably achievable. US RENAL COMPLETE    Result Date: 9/8/2021  BILATERAL RENAL ULTRASOUND: CLINICAL INFORMATION: Renal failure COMPARISON: No comparison available. TECHNIQUE: Multiple sonographic images of both kidneys were obtained. Images of the urinary bladder were also obtained. FINDINGS: RIGHT KIDNEY - 13.0 x 6.6 x 6.0 cm Resistive Index - 0.63 Cortical Thickness - 1.2 cm LEFT KIDNEY - 11.5 x 5.1 x 5.4 cm Resistive Index - 0.62 Cortical Thickness - 1.2 cm URINARY BLADDER catheter  KIDNEYS:  Both kidneys are normal in size and contour. The resistive indices are normal.  MASS/CYST: No solid or cystic lesion of either kidney is seen. HYDRONEPHROSIS:  There is no hydronephrosis. CALCULI:  No calculi are seen. BLADDER:  Urinary bladder empty with Hampton catheter in place. Moderate amount of sludge in the gallbladder. .     Normal renal ultrasound **This report has been created using voice recognition software. It may contain minor errors which are inherent in voice recognition technology. ** Final report electronically signed by Dr. Asim Limon on 9/8/2021 4:08 PM    XR CHEST PORTABLE    Result Date: 9/8/2021  PROCEDURE: XR CHEST PORTABLE CLINICAL INFORMATION: 41-year-old male who underwent endotracheal and nasogastric tube placement. COMPARISON: Chest x-ray 09/08/2021. TECHNIQUE: 3 AP semiupright views of the chest were obtained. FINDINGS: There is a nasogastric tube coursing along the route of the esophagus and terminating in the stomach. There is a nasogastric tube approximately 6.5 cm from the stan. The cardiac silhouette and pulmonary vasculature are within normal limits. There is no significant pleural effusion or pneumothorax. Visualized portions of the upper abdomen are within normal limits. The osseous structures are intact. No acute fractures or suspicious osseous lesions. Interval placement of an endotracheal and nasogastric tube. Otherwise stable appearance of the chest when compared to the prior study. **This report has been created using voice recognition software. It may contain minor errors which are inherent in voice recognition technology. ** Final report electronically signed by Dr Yue Lyle on 9/8/2021 4:21 PM    XR CHEST PORTABLE    Result Date: 9/8/2021  PROCEDURE: XR CHEST PORTABLE CLINICAL INFORMATION: rhonci and obtunded. COMPARISON: No prior study. TECHNIQUE: AP upright view of the chest. FINDINGS: The heart size is at the upper limits of normal.The mediastinum is not widened. There are no pulmonary infiltrates or effusions. The pulmonary vascularity is normal. No suspicious osseous lesions are present. No acute cardiopulmonary process. **This report has been created using voice recognition software. It may contain minor errors which are inherent in voice recognition technology. ** Final report electronically signed by Dr. Ann Marie Cruz on 9/8/2021 7:56 AM    MRI BRAIN WO CONTRAST    Result Date: 9/8/2021  PROCEDURE: MRI BRAIN WO CONTRAST CLINICAL INFORMATION HCV. Unresponsive. HSV on the right forehead. Assess for HSV encephalitis. COMPARISON: Head CT 9/7/2021. TECHNIQUE: Multiplanar and multiple spin echo MRI images were obtained of the brain without contrast. FINDINGS: The diffusion-weighted images are normal.  The brain volume is mildly reduced. There is a mild amount of signal hyperintensity on the FLAIR and T2-weighted sequences in the white matter of the brain. This is consistent with mild severity chronic small vessel ischemic changes. There is no abnormal signal in the temporal lobes. There is a small area of old infarct or volume loss in the periphery of the left cerebellum. There are no intra-or extra-axial collections. There is no hydrocephalus, midline shift or mass effect. There is mineralization in the medial aspects of the basal ganglia bilaterally. The major intracranial vascular flow voids are present. The midline craniocervical junction structures are normal.  The pituitary gland and brainstem are normal. There is some fullness of the nasal mucosa. There is some trace fluid in the left mastoid air cells. 1. No acute findings. 2. Mild severity chronic small vessel ischemic changes.  **This report has been created using voice recognition software. It may contain minor errors which are inherent in voice recognition technology. ** Final report electronically signed by Dr. Samuel Garcia on 9/8/2021 11:48 AM    CXR 9/12/2021         Impression   1. Normal heart size. ET tube and NG tube in good position. Left subclavian dialysis catheter with tip at cavoatrial junction. 2. Mildly increased markings right perihilar region of both lung bases, consistent with mild atelectasis/pneumonia. Overall appearance slightly improved from prior.               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 WBC count up today from 9.7 to 12.3. Will get urinalysis and blood cultures  CXR 9/12/2021 improving atelectasis  Sedation stopped. Patient waking up more, neuro will continue to follow to assess prognosis. Patient seen and evaluated with Dr. Robert Paredes and he is in agreement with the assessment and plan.     Electronically signed by Ladonna Jeans, CNP on 9/12/21 at 1:48 PM EDT

## 2021-09-12 NOTE — PROGRESS NOTES
Discontinued by infectious disease  As patient asymptomatic   7. Hypotension:  Secondary to infection. Lisinopril on hold due to DENADRE  8. BPH: hold Flomax due to Hypotension  9. HTN: lisinopril held secondary to DEANDRE      INITIAL H AND P AND ICU COURSE:  Geeta Marlow is an 59-year-old white male who presented to [facility] on 9/6/2021 with complaints of right eye swelling and pain. He has a past medical history of reformed smoker hypertension and BPH. Per report patient had been having eye swelling for the past 2 to 3 days. He tried Motrin and applied warm clots. He found mild relief. He did have some photophobia and a small rash. This continued to worsen and began to itch. He came to the ER on 9/5 was found to have corneal abrasions and bacterial conjunctivitis. He was given gentamicin drops for his eyes and was told to follow-up with Dr. Lori Vincent. He returned to the ER with worsening symptoms. He was started on IV acyclovir with concern for herpes zoster ophthalmicus. He was initially admitted to stepdown. Patient then became hypotensive on stepdown on 9/7. He was a rapid response. CT head was obtained due to altered mental status with no changes. On 9/8 there was a CODE BLUE. Patient was intubated. He was altered and hypoxic. He was transferred to ICU for further management. Spinal tap was completed which was positive for HSV. Past Medical History: Per HPI  Family History: Mother: Hypertension  Social History: Reformed smoker, denies alcohol use, denies drug use. ROS   Intubated and encephalopathic currently.      Scheduled Meds:   acyclovir  10 mg/kg (Ideal) IntraVENous Q12H    potassium chloride  20 mEq IntraVENous Q2H    LORazepam  2 mg IntraVENous Once    levetiracetam  1,000 mg IntraVENous Q12H    midazolam  2 mg IntraVENous Once    prednisoLONE acetate  1 drop Right Eye 4 times per day    [Held by provider] QUEtiapine  25 mg Oral BID    [Held by provider] baclofen 20 mg Oral TID    calcium elemental  500 mg Oral BID    [Held by provider] lisinopril  20 mg Oral Daily    [Held by provider] magnesium oxide  400 mg Oral 4x Daily AC & HS    vitamin B-6  200 mg Oral Daily    [Held by provider] tamsulosin  0.4 mg Oral Daily    Vitamin D  1,000 Units Oral BID AC    zinc sulfate  50 mg Oral Daily    sodium chloride flush  5-40 mL IntraVENous 2 times per day     Continuous Infusions:   sodium chloride 50 mL/hr at 09/12/21 1233    niCARdipine 10 mg/hr (09/12/21 1253)    propofol 7.5 mcg/kg/min (09/12/21 0627)    sodium chloride         PHYSICAL EXAMINATION:  T:  98.8.  P:  85. RR:  16. B/P:  139/99. FiO2:  35. O2 Sat:  97.  I/O:  5276/2150  Body mass index is 33.46 kg/m². PC: 12/6: TV: 498: RRTotal: 16: Ti:1 sec:  General: Acute on chronically ill-appearing white male  HEENT:  normocephalic and atraumatic. No scleral icterus. PERR crusting to the eye. Neck: supple. No Thyromegaly. Lungs: Diminished to auscultation. No retractions  Cardiac: RRR. No JVD. Abdomen: soft. Nontender. Extremities:  No clubbing, cyanosis, or edema x 4. Vasculature: capillary refill < 3 seconds. Palpable dorsalis pedis pulses. Skin:  warm and dry. Psych: Withdraws from pain; is obeying commands; will open eyes   Lymph:  No supraclavicular adenopathy. Neurologic:  No focal deficit. No seizures. Data: (All radiographs, tracings, PFTs, and imaging are personally viewed and interpreted unless otherwise noted).     Sodium 134, potassium 2.3, chloride 98, CO2 24, BUN 18, creatinine 1.4, anion gap 12, magnesium 1.9, glucose 127, calcium 8.9   Hemoglobin 13.2, white blood cell 12.2, MCV 88.7, platelet count 845   Telemetry shows normal sinus rhythm   Spinal fluid 9/8/2021 positive for HSV   CT head 9/5/2021 reports no evidence of acute intercranial abnormalities   CT facial bones 9/5/2021 reports no evidence of acute intercranial abnormalities   CT cervical spine 9/5/2021 reports no evidence of acute osseous injury of the cervical spine   CT head 9/7/2021 reports no acute intercranial hemorrhage or process.  Chest x-ray 9/9/2021 reports mild atelectasis pneumonia both lungs.  MRI 9/8/2021 reports no acute findings   Renal ultrasound 9/8/2021 reports normal ultrasound of kidneys   Chest x-ray on 9/12 shows mildly increased markings perihilar region of both lung bases. Consistent with mild atelectasis/pneumonia. Overall appearance of improved from prior. Meets Continued ICU Level Care Criteria:    [x] Yes   [] No - Transfer Planned to listed location:  [] HOSPITALIST CONTACTED-      Case and plan discussed with Dr. Eliane Stark. Electronically signed by Yakelin Dey DO  CRITICAL CARE SPECIALIST    Patient seen by me. Case discussed with resident physician. Patient had gotten sedation due to breath stacking, however he then became arm all are altered. Is taken a long time for sedation to wear off. Neurology has seen his recommended no more sedation at all if he becomes tachypneic to try other methods to avoid stacking. Continue supportive care. ID following. Italicized font represents changes to the note made by me. CC time 35 minutes. Time was discontiguous. Time does not include procedures. Time does include my direct assessment of the patient and coordination of care.

## 2021-09-12 NOTE — PROGRESS NOTES
GLUCOSE  --  144* 161*  --  127*   CALCIUM  --  9.3 9.3  --  8.9   MG  --   --  2.0  --  1.9   PHOS  --   --  3.1  --   --     < > = values in this interval not displayed. Hepatic:   No results for input(s): LABALBU, AST, ALT, ALB, BILITOT, ALKPHOS in the last 72 hours. Meds:  Infusion:    niCARdipine Stopped (09/11/21 2321)    dextrose 35 mL/hr at 09/12/21 1207    propofol 7.5 mcg/kg/min (09/12/21 0627)    sodium chloride       Meds:    acyclovir  10 mg/kg (Ideal) IntraVENous Q12H    potassium chloride        potassium chloride  20 mEq IntraVENous Q2H    LORazepam  2 mg IntraVENous Once    levetiracetam  1,000 mg IntraVENous Q12H    midazolam  2 mg IntraVENous Once    prednisoLONE acetate  1 drop Right Eye 4 times per day    [Held by provider] QUEtiapine  25 mg Oral BID    [Held by provider] baclofen  20 mg Oral TID    calcium elemental  500 mg Oral BID    [Held by provider] lisinopril  20 mg Oral Daily    [Held by provider] magnesium oxide  400 mg Oral 4x Daily AC & HS    vitamin B-6  200 mg Oral Daily    [Held by provider] tamsulosin  0.4 mg Oral Daily    Vitamin D  1,000 Units Oral BID AC    zinc sulfate  50 mg Oral Daily    sodium chloride flush  5-40 mL IntraVENous 2 times per day     Meds prn: sodium chloride flush, sodium chloride, ondansetron **OR** ondansetron, polyethylene glycol, acetaminophen **OR** acetaminophen, gabapentin, morphine       Impression and Plan:  1. DEANDRE: Nonoliguric, likely ATN  Had emergent hemodialysis treatment on Thursday out of concerns for acyclovir-induced neurotoxicity  Renal function is improving  Creatinine down to 1.4  Excellent urine output  No need for renal replacement therapy    2. Hypokalemia. Will replace with 20 KCl x2 doses  3. Hypernatremia. On D5W. Will stop D5W. Will start patient on normal saline at 50 mL/h  4. Altered mental status.    5.  Herpes zoster ophthalmicus: Pharmacy to adjust acyclovir dose based on renal function  6. Mild metabolic acidosis  7. Hypertension  8.   History of BPH    D/W family and RN    Alexis Rodriguez MD  [facility]

## 2021-09-13 LAB
ANION GAP SERPL CALCULATED.3IONS-SCNC: 11 MEQ/L (ref 8–16)
BASOPHILS # BLD: 0.7 %
BASOPHILS ABSOLUTE: 0.1 THOU/MM3 (ref 0–0.1)
BLOOD CULTURE, ROUTINE: NORMAL
BLOOD CULTURE, ROUTINE: NORMAL
BUN BLDV-MCNC: 19 MG/DL (ref 7–22)
CALCIUM SERPL-MCNC: 9.1 MG/DL (ref 8.5–10.5)
CHLORIDE BLD-SCNC: 99 MEQ/L (ref 98–111)
CO2: 25 MEQ/L (ref 23–33)
CREAT SERPL-MCNC: 0.9 MG/DL (ref 0.4–1.2)
EOSINOPHIL # BLD: 2.5 %
EOSINOPHILS ABSOLUTE: 0.3 THOU/MM3 (ref 0–0.4)
ERYTHROCYTE [DISTWIDTH] IN BLOOD BY AUTOMATED COUNT: 13.8 % (ref 11.5–14.5)
ERYTHROCYTE [DISTWIDTH] IN BLOOD BY AUTOMATED COUNT: 44.6 FL (ref 35–45)
GFR SERPL CREATININE-BSD FRML MDRD: 81 ML/MIN/1.73M2
GLUCOSE BLD-MCNC: 113 MG/DL (ref 70–108)
HCT VFR BLD CALC: 40.9 % (ref 42–52)
HEMOGLOBIN: 13.2 GM/DL (ref 14–18)
IMMATURE GRANS (ABS): 0.15 THOU/MM3 (ref 0–0.07)
IMMATURE GRANULOCYTES: 1.4 %
LYMPHOCYTES # BLD: 14.5 %
LYMPHOCYTES ABSOLUTE: 1.5 THOU/MM3 (ref 1–4.8)
MCH RBC QN AUTO: 28.9 PG (ref 26–33)
MCHC RBC AUTO-ENTMCNC: 32.3 GM/DL (ref 32.2–35.5)
MCV RBC AUTO: 89.5 FL (ref 80–94)
MONOCYTES # BLD: 9.3 %
MONOCYTES ABSOLUTE: 1 THOU/MM3 (ref 0.4–1.3)
NUCLEATED RED BLOOD CELLS: 0 /100 WBC
PLATELET # BLD: 232 THOU/MM3 (ref 130–400)
PMV BLD AUTO: 9.1 FL (ref 9.4–12.4)
POTASSIUM REFLEX MAGNESIUM: 3.7 MEQ/L (ref 3.5–5.2)
RBC # BLD: 4.57 MILL/MM3 (ref 4.7–6.1)
SEG NEUTROPHILS: 71.6 %
SEGMENTED NEUTROPHILS ABSOLUTE COUNT: 7.4 THOU/MM3 (ref 1.8–7.7)
SODIUM BLD-SCNC: 135 MEQ/L (ref 135–145)
WBC # BLD: 10.4 THOU/MM3 (ref 4.8–10.8)

## 2021-09-13 PROCEDURE — 6370000000 HC RX 637 (ALT 250 FOR IP): Performed by: FAMILY MEDICINE

## 2021-09-13 PROCEDURE — 99291 CRITICAL CARE FIRST HOUR: CPT | Performed by: INTERNAL MEDICINE

## 2021-09-13 PROCEDURE — 2000000000 HC ICU R&B

## 2021-09-13 PROCEDURE — 36415 COLL VENOUS BLD VENIPUNCTURE: CPT

## 2021-09-13 PROCEDURE — 6360000002 HC RX W HCPCS: Performed by: INTERNAL MEDICINE

## 2021-09-13 PROCEDURE — 2500000003 HC RX 250 WO HCPCS: Performed by: STUDENT IN AN ORGANIZED HEALTH CARE EDUCATION/TRAINING PROGRAM

## 2021-09-13 PROCEDURE — 2580000003 HC RX 258: Performed by: PHYSICIAN ASSISTANT

## 2021-09-13 PROCEDURE — 6360000002 HC RX W HCPCS: Performed by: FAMILY MEDICINE

## 2021-09-13 PROCEDURE — 2580000003 HC RX 258: Performed by: FAMILY MEDICINE

## 2021-09-13 PROCEDURE — 6360000002 HC RX W HCPCS: Performed by: PHYSICIAN ASSISTANT

## 2021-09-13 PROCEDURE — 2700000000 HC OXYGEN THERAPY PER DAY

## 2021-09-13 PROCEDURE — 99233 SBSQ HOSP IP/OBS HIGH 50: CPT | Performed by: NURSE PRACTITIONER

## 2021-09-13 PROCEDURE — 85025 COMPLETE CBC W/AUTO DIFF WBC: CPT

## 2021-09-13 PROCEDURE — 2580000003 HC RX 258: Performed by: INTERNAL MEDICINE

## 2021-09-13 PROCEDURE — 87040 BLOOD CULTURE FOR BACTERIA: CPT

## 2021-09-13 PROCEDURE — 80048 BASIC METABOLIC PNL TOTAL CA: CPT

## 2021-09-13 PROCEDURE — 6360000002 HC RX W HCPCS: Performed by: STUDENT IN AN ORGANIZED HEALTH CARE EDUCATION/TRAINING PROGRAM

## 2021-09-13 PROCEDURE — 99232 SBSQ HOSP IP/OBS MODERATE 35: CPT | Performed by: INTERNAL MEDICINE

## 2021-09-13 PROCEDURE — 94003 VENT MGMT INPAT SUBQ DAY: CPT

## 2021-09-13 PROCEDURE — 2580000003 HC RX 258: Performed by: STUDENT IN AN ORGANIZED HEALTH CARE EDUCATION/TRAINING PROGRAM

## 2021-09-13 RX ORDER — FUROSEMIDE 10 MG/ML
40 INJECTION INTRAMUSCULAR; INTRAVENOUS ONCE
Status: COMPLETED | OUTPATIENT
Start: 2021-09-13 | End: 2021-09-13

## 2021-09-13 RX ORDER — SENNA PLUS 8.6 MG/1
1 TABLET ORAL NIGHTLY
Status: DISCONTINUED | OUTPATIENT
Start: 2021-09-13 | End: 2021-09-20 | Stop reason: HOSPADM

## 2021-09-13 RX ORDER — POLYETHYLENE GLYCOL 3350 17 G/17G
17 POWDER, FOR SOLUTION ORAL DAILY
Status: DISCONTINUED | OUTPATIENT
Start: 2021-09-13 | End: 2021-09-20 | Stop reason: HOSPADM

## 2021-09-13 RX ADMIN — ACYCLOVIR SODIUM 650 MG: 50 INJECTION, SOLUTION INTRAVENOUS at 10:02

## 2021-09-13 RX ADMIN — FUROSEMIDE 40 MG: 10 INJECTION, SOLUTION INTRAMUSCULAR; INTRAVENOUS at 17:45

## 2021-09-13 RX ADMIN — ACYCLOVIR SODIUM 650 MG: 50 INJECTION, SOLUTION INTRAVENOUS at 17:46

## 2021-09-13 RX ADMIN — SODIUM CHLORIDE, PRESERVATIVE FREE 10 ML: 5 INJECTION INTRAVENOUS at 00:04

## 2021-09-13 RX ADMIN — GABAPENTIN 300 MG: 300 CAPSULE ORAL at 00:05

## 2021-09-13 RX ADMIN — LEVETIRACETAM 1000 MG: 100 INJECTION, SOLUTION INTRAVENOUS at 22:09

## 2021-09-13 RX ADMIN — ONDANSETRON 4 MG: 2 INJECTION INTRAMUSCULAR; INTRAVENOUS at 21:13

## 2021-09-13 RX ADMIN — PREDNISOLONE ACETATE 1 DROP: 10 SUSPENSION/ DROPS OPHTHALMIC at 17:54

## 2021-09-13 RX ADMIN — SODIUM CHLORIDE, PRESERVATIVE FREE 10 ML: 5 INJECTION INTRAVENOUS at 22:08

## 2021-09-13 RX ADMIN — DEXTROSE MONOHYDRATE 5 MG/HR: 50 INJECTION, SOLUTION INTRAVENOUS at 21:06

## 2021-09-13 RX ADMIN — DEXTROSE MONOHYDRATE 5 MG/HR: 50 INJECTION, SOLUTION INTRAVENOUS at 13:57

## 2021-09-13 RX ADMIN — LEVETIRACETAM 1000 MG: 100 INJECTION, SOLUTION INTRAVENOUS at 10:01

## 2021-09-13 RX ADMIN — PREDNISOLONE ACETATE 1 DROP: 10 SUSPENSION/ DROPS OPHTHALMIC at 00:03

## 2021-09-13 RX ADMIN — PREDNISOLONE ACETATE 1 DROP: 10 SUSPENSION/ DROPS OPHTHALMIC at 13:04

## 2021-09-13 RX ADMIN — DEXTROSE MONOHYDRATE 5 MG/HR: 50 INJECTION, SOLUTION INTRAVENOUS at 01:48

## 2021-09-13 RX ADMIN — PREDNISOLONE ACETATE 1 DROP: 10 SUSPENSION/ DROPS OPHTHALMIC at 05:56

## 2021-09-13 RX ADMIN — ACETAMINOPHEN 650 MG: 325 TABLET ORAL at 00:07

## 2021-09-13 ASSESSMENT — PAIN DESCRIPTION - ONSET
ONSET: ON-GOING
ONSET: ON-GOING

## 2021-09-13 ASSESSMENT — PAIN DESCRIPTION - DESCRIPTORS
DESCRIPTORS: ACHING;DISCOMFORT
DESCRIPTORS: ACHING;DISCOMFORT

## 2021-09-13 ASSESSMENT — PAIN DESCRIPTION - ORIENTATION
ORIENTATION: RIGHT;UPPER
ORIENTATION: RIGHT;UPPER

## 2021-09-13 ASSESSMENT — PAIN DESCRIPTION - LOCATION
LOCATION: HEAD
LOCATION: HEAD

## 2021-09-13 ASSESSMENT — PAIN DESCRIPTION - PAIN TYPE
TYPE: ACUTE PAIN
TYPE: ACUTE PAIN

## 2021-09-13 ASSESSMENT — PAIN - FUNCTIONAL ASSESSMENT: PAIN_FUNCTIONAL_ASSESSMENT: ACTIVITIES ARE NOT PREVENTED

## 2021-09-13 ASSESSMENT — PAIN SCALES - GENERAL
PAINLEVEL_OUTOF10: 5
PAINLEVEL_OUTOF10: 2
PAINLEVEL_OUTOF10: 5
PAINLEVEL_OUTOF10: 5
PAINLEVEL_OUTOF10: 2
PAINLEVEL_OUTOF10: 0

## 2021-09-13 ASSESSMENT — PAIN DESCRIPTION - FREQUENCY
FREQUENCY: CONTINUOUS
FREQUENCY: CONTINUOUS

## 2021-09-13 ASSESSMENT — PAIN DESCRIPTION - PROGRESSION: CLINICAL_PROGRESSION: NOT CHANGED

## 2021-09-13 ASSESSMENT — PULMONARY FUNCTION TESTS: PIF_VALUE: 16

## 2021-09-13 NOTE — PROGRESS NOTES
Pt's daughter Raimundo Printers, and her mother called to receive an update on the pt's condition. Update given and questions answered.

## 2021-09-13 NOTE — PROGRESS NOTES
[facility] CRITICAL CARE PROGRESS NOTE      Patient:  Gilford Bastos    Unit/Bed:4D-17/017-A  YOB: 1940  MRN: [id number]   PCP: Orson Dakin  Date of Admission: 9/6/2021  Chief Complaint:- right eye pain and swelling     Assessment and Plan:      Acute Encephalopathy: Improving; Due to Herpes Zoster Virus Encephalitis. CT of the head showed no acute intracranial hemorrhage or process in the brain. MRI of the brain shows no acute findings and mild severity chronic small vessel ischemic changes. LP performed on 9/8 positive for +Varicella-Zoster. Varicella-Zoster ophthalmicus: Rash is on the right side of the face/head in the V1 distribution. No history of VZV vaccine. Patient saw ophthalmology and recommends prednisone eyedrops every 6 hours until next appointment 9/20. Acyclovir day 8. Infectious disease following. Seizure:  No hx of seizures. Neurology notified. EEG showed periodic lateralized discharges epileptogenic in nature indicates seizure tendency in proper clinical context. On Keppra 1 g twice daily IV daily. Neurology following   Enterococcus group D UTI:  Urine culture grew greater than 100,000 CFU. Discontinued by infectious disease  BPH: resume flomax. HTN: lisinopril held secondary to DEANDRE; patient currently on Cardene gtt; Will switch to po once speech clears for patient to swallow. Acute Hypoxic Respiratory Failure: Secondary to Infection. Patient also was very altered and concern for protecting his own airway. Currently requiring minimal pressure support and peep. Lung protective strategies by keeping Peak pressure <35 and Plateau Pressure <93. Patient is off sedation however is currently only responding to painful stimuli. Resolved. Acute renal failure: Worsening. Secondary to Acyclovir. Developed DEANDRE on 9/7 and patient was started on normal saline 125 mL/h continuous. All nephrotoxic agents are held. Patient is still making urine. Avoid further hypotension.  Renally dosed meds. S/p hemo dialysis. Resolved. Hypotension:  Secondary to infection. Lisinopril on hold due to DEANDRE. Resolved. INITIAL H AND P AND ICU COURSE:  Brian Tyler is an 56-year-old white male who presented to [facility] on 9/6/2021 with complaints of right eye swelling and pain. He has a past medical history of reformed smoker hypertension and BPH. Per report patient had been having eye swelling for the past 2 to 3 days. He tried Motrin and applied warm clots. He found mild relief. He did have some photophobia and a small rash. This continued to worsen and began to itch. He came to the ER on 9/5 was found to have corneal abrasions and bacterial conjunctivitis. He was given gentamicin drops for his eyes and was told to follow-up with Dr. Manjinder Masters. He returned to the ER with worsening symptoms. He was started on IV acyclovir with concern for herpes zoster ophthalmicus. He was initially admitted to stepdown. Patient then became hypotensive on stepdown on 9/7. He was a rapid response. CT head was obtained due to altered mental status with no changes. On 9/8 there was a CODE BLUE. Patient was intubated. He was altered and hypoxic. He was transferred to ICU for further management. Spinal tap was completed which was positive for HSV. Past Medical History: Per HPI  Family History: Mother: Hypertension  Social History: Reformed smoker, denies alcohol use, denies drug use.     ROS   Intubated and sedated on mechanical ventilation    Scheduled Meds:   acyclovir  10 mg/kg (Ideal) IntraVENous Q8H    polyethylene glycol  17 g Oral Daily    senna  1 tablet Oral Nightly    docusate  100 mg Oral Daily    furosemide  40 mg IntraVENous Once    LORazepam  2 mg IntraVENous Once    levetiracetam  1,000 mg IntraVENous Q12H    midazolam  2 mg IntraVENous Once    prednisoLONE acetate  1 drop Right Eye 4 times per day    [Held by provider] QUEtiapine  25 mg Oral BID    [Held by provider] baclofen  20 mg Oral TID    calcium elemental  500 mg Oral BID    [Held by provider] lisinopril  20 mg Oral Daily    [Held by provider] magnesium oxide  400 mg Oral 4x Daily AC & HS    vitamin B-6  200 mg Oral Daily    tamsulosin  0.4 mg Oral Daily    Vitamin D  1,000 Units Oral BID AC    zinc sulfate  50 mg Oral Daily    sodium chloride flush  5-40 mL IntraVENous 2 times per day     Continuous Infusions:   sodium chloride 50 mL/hr at 09/12/21 1233    niCARdipine 5 mg/hr (09/13/21 1357)    sodium chloride         PHYSICAL EXAMINATION:  T:  97.4  P:  91 last year RR:  17. B/P:  147/100  O2 Sat:  97.  I/O:  -689  Body mass index is 33.46 kg/m². General: Acute on chronically ill-appearing white male  HEENT:  normocephalic and atraumatic. No scleral icterus. PERR crusting to the eye. Neck: supple. No Thyromegaly. Lungs: No retractions; Crackles bilaterally at bases   Cardiac: RRR. No JVD. Abdomen: soft. Nontender. Extremities:  No clubbing, cyanosis, or edema x 4. Vasculature: capillary refill < 3 seconds. Palpable dorsalis pedis pulses. Skin:  warm and dry. Psych: patient obeys commands   Lymph:  No supraclavicular adenopathy. Neurologic:  No focal deficit. No seizures. Data: (All radiographs, tracings, PFTs, and imaging are personally viewed and interpreted unless otherwise noted). Sodium 140, potassium 3.2, chloride 105, CO2 24, BUN 19, creatinine 1.9, anion gap 11.0, glucose 161. WBC 9.7, hemoglobin 13.9, hematocrit 42.9, platelet count 324  Telemetry shows normal sinus rhythm  Spinal fluid 9/8/2021 positive for HSV  CT head 9/5/2021 reports no evidence of acute intercranial abnormalities  CT facial bones 9/5/2021 reports no evidence of acute intercranial abnormalities  CT cervical spine 9/5/2021 reports no evidence of acute osseous injury of the cervical spine  CT head 9/7/2021 reports no acute intercranial hemorrhage or process. Chest x-ray 9/9/2021 reports mild atelectasis pneumonia both lungs.   MRI 9/8/2021 reports no acute findings  Renal ultrasound 9/8/2021 reports normal ultrasound of kidneys  Chest x-ray done on 9/12 shows mildly increased markings right perihilar region of both lung bases consistent with atelectasis/pneumonia. Overall appears slightly improved from prior. Meets Continued ICU Level Care Criteria:    [x] Yes   [] No - Transfer Planned to listed location:  [] HOSPITALIST CONTACTED-      Case and plan discussed with Dr. Parisi [facility]. Electronically signed by Ely Kern DO  CRITICAL CARE SPECIALIST  Patient seen by me. Case discussed with resident physician. Patient back on acyclovir. Dialysis per nephrology. Neurologic improvement. Italicized font represents changes to the note made by me. CC time 35 minutes. Time was discontiguous. Time does not include procedures. Time does include my direct assessment of the patient and coordination of care.   Electronically signed by Keturah Moritz, MD on 9/14/2021 at 11:02 AM

## 2021-09-13 NOTE — PROGRESS NOTES
Progress note: Infectious diseases    Patient - Prema Stevens,  Age - [de-identified] y.o.    - 1940      Room Number - 4D-17/017-A   MRN -  [id number]   Acct # - [de-identified]  Date of Admission -  2021 11:26 AM    SUBJECTIVE:   Per nursing staff the patient was extubated this morning at approximately 0815. The patient tolerated well however is having intermittent episode of oxygen desaturation. On examination the patient had weak voice, cough, and audible oral secretions. Given his weak voice, he was unable to communicate any concerns at this time with yes/no prompts. He denied chest pain, abdominal pain, or nausea. He admitted to weakness and some confusion which is to be expected given his recent extubation. OBJECTIVE   VITALS    height is 5' 7\" (1.702 m) and weight is 213 lb 10 oz (96.9 kg). His axillary temperature is 97.4 °F (36.3 °C). His blood pressure is 126/87 and his pulse is 90. His respiration is 12 and oxygen saturation is 96%. Wt Readings from Last 3 Encounters:   21 213 lb 10 oz (96.9 kg)   21 210 lb (95.3 kg)   19 207 lb 3.2 oz (94 kg)       I/O (24 Hours)    Intake/Output Summary (Last 24 hours) at 2021 0926  Last data filed at 2021 0745  Gross per 24 hour   Intake 4426 ml   Output 3300 ml   Net 1126 ml       General Appearance:  Awake, alert, oriented,  Mild respiratory distress  HEENT - normocephalic, atraumatic, pink conjunctiva,  anicteric sclera, VZV rash on right face in v1 distribution with scabbing of vesicles  Neck - Supple, no mass  Lungs -  Bilateral good air entry, (+) rhonchi, (+) wheeze  Cardiovascular - Heart sounds are normal.  Regular rate and rhythm without murmur, gallop or rub. Abdomen - soft, not distended, nontender, bowel sounds present in all quadrants   Neurologic - No focal deficits  Skin - No bruising or bleeding.  Vesicles appearing on skin  Extremities - No edema, no cyanosis, clubbing     MEDICATIONS:      acyclovir  10 mg/kg (Ideal) IntraVENous Q8H    polyethylene glycol  17 g Oral Daily    senna  1 tablet Oral Nightly    docusate  100 mg Oral Daily    LORazepam  2 mg IntraVENous Once    levetiracetam  1,000 mg IntraVENous Q12H    midazolam  2 mg IntraVENous Once    prednisoLONE acetate  1 drop Right Eye 4 times per day    [Held by provider] QUEtiapine  25 mg Oral BID    [Held by provider] baclofen  20 mg Oral TID    calcium elemental  500 mg Oral BID    [Held by provider] lisinopril  20 mg Oral Daily    [Held by provider] magnesium oxide  400 mg Oral 4x Daily AC & HS    vitamin B-6  200 mg Oral Daily    [Held by provider] tamsulosin  0.4 mg Oral Daily    Vitamin D  1,000 Units Oral BID AC    zinc sulfate  50 mg Oral Daily    sodium chloride flush  5-40 mL IntraVENous 2 times per day      sodium chloride 50 mL/hr at 09/12/21 1233    niCARdipine 5 mg/hr (09/13/21 0148)    sodium chloride       sodium chloride flush, sodium chloride, ondansetron **OR** ondansetron, acetaminophen **OR** acetaminophen, gabapentin, morphine      LABS:     CBC:   Recent Labs     09/11/21 0413 09/12/21 0446 09/13/21 0427   WBC 9.7 12.3* 10.4   HGB 13.9* 13.2* 13.2*    214 232     BMP:    Recent Labs     09/11/21 0413 09/11/21 0413 09/11/21  1615 09/12/21 0446 09/12/21  1815 09/13/21 0427      < > 139 134*  --  135   K 3.2*   < > 3.7 3.3* 3.6 3.7     --   --  98  --  99   CO2 24  --   --  24  --  25   BUN 19  --   --  18  --  19   CREATININE 1.9*  --   --  1.4*  --  0.9   GLUCOSE 161*  --   --  127*  --  113*    < > = values in this interval not displayed. Calcium:  Recent Labs     09/13/21 0427   CALCIUM 9.1     Ionized Calcium:No results for input(s): IONCA in the last 72 hours.   Magnesium:  Recent Labs     09/12/21 0446   MG 1.9     Phosphorus:  Recent Labs     09/11/21 0413   PHOS 3.1       CULTURES:   UA: No results for input(s): SPECGRAV, PHUR, COLORU, CLARITYU, MUCUS, PROTEINU, BLOODU, RBCUA, WBCUA, BACTERIA, NITRU, GLUCOSEU, BILIRUBINUR, UROBILINOGEN, KETUA, LABCAST, LABCASTTY, AMORPHOS in the last 72 hours. 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    Encephalopathy G93.40         ASSESSMENT/PLAN     Herpes zoster ophthalmicus - right V1 distribution with complete crusting of vesicles. There appears to be vesicles on the skin indicating dissemination.  - Continue Acyclovir dose adjusted due to improvement of the renal function    Acute renal failure - Improving. Likely secondary to acyclovir nephrotoxicity. US renal normal. Patient received HD (9/9/21) with 0 net ultrafiltration. Currently having excellent urin output with Cr back to baseline. GFR is steadily increasing.  - Nephrology following with continuation of hemodialysis as indicated    Asymptomatic UTI - Culture demonstrating Group D enterococcus faecalis. - No indication for ABx treatment. Acute hypoxic respiratory failure - Extubated this morning 9/13/21. Still experiencing intermittent oxygen desaturations. - Continue to wean supplemental oxygen to maintain SpO2>90%. Electronically signed by Geno Ferreira DO on 9/13/2021 at 9:26 AM   Patient was seen and examined face-to-face by me  The chart, progress notes, labs and radiographs were reviewed. Case discussed with the nurse. Questions and concerns were addressed. I agree with the progress note. Discussed with nephrology.

## 2021-09-13 NOTE — SIGNIFICANT EVENT
Patient seen by me. Case discussed with does not physician. Patient did well with spontaneous breathing trial and was extubated 9/13/2021. Patient is awake and interactive. Continue with acyclovir. Once patient tolerating p.o., transition nicardipine to p.o..  Italicized font represents changes to the note made by me. CC time 35 minutes. Time was discontiguous. Time does not include procedures. Time does include my direct assessment of the patient and coordination of care.   Electronically signed by Keturah Moritz, MD on 9/13/2021 at 11:23 AM

## 2021-09-13 NOTE — PROGRESS NOTES
Patient noted to desat while attempting to talk to Dr. Gris Dawkins. O2Sat noted to drop to 83% on 5L. Increased patients O2 to 6L. Encouraged patient to rest during assessment.  O2 noted to increase to 94% on 6L

## 2021-09-13 NOTE — PROGRESS NOTES
Patient deep suctioned at this time. Moderate amount of secretions suctioned from throat. Patient does gag on suction catheter. Cough remains weak. Patient bathed at this time as well. Noted green thick secretions coming from urethra around catheter. Urine remains straw yellow. Patient also noted to have red blanchable area to right medial gluteal crease. Patient repositioned and barrier cream applied.

## 2021-09-13 NOTE — PROGRESS NOTES
Dr. Jeff Hernandez at bedside, states to continue with charles's recommendations for acyclovir. Patient noted to be 91% on 4L. Suctioned patient with minimal secretions noted.  Increased patients O2 to 5L/min

## 2021-09-13 NOTE — PROGRESS NOTES
Neurology Progress Note    Date:9/13/2021       JZNK:[id number]  Patient Name:Miah Blank     YOB: 1940     Age:81 y.o.    CC:   Chief Complaint   Patient presents with    Eye Pain        Subjective      Rudy Nash is an [de-identified] M with PMH of HTN who presented to [facility] with painful lesions, facial swelling since and R eye drainage and photophobia which began to develop on 9/4/21. History of shingles unknown, no hx of shingles immunization. No additional history available due to the condition of the patient. 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 His acyclovir is currently being held due to concerns regarding his kidney function. Overnight, his nurse noted some myoclonic jerking in his upper extremities which went on to affect his lower extremities. He is not currently on any antiepileptic medications but was on propofol and Versed at the time. Interval history 9/10/2021:  Acyclovir held yesterday d/t DEANDRE. Plan to resume today per nephro and ID. Dialysis cath placed yesterday. Underwent dialysis last night. No witnessed seizure activity since starting the keppra. Interval History 9/11/2021:  Patient on acyclovir. No new seizure activity with loading dose of keprra 1000mg and malignance dose increase of 1000mg BID. He is off sedation, withdrawing to pain, not following commands at this time. Interval History 9/12/2021:  No acute event overnight. Patient remains of acyclovir. Per RN last evening they took him off sedation and he was able to squeeze hands, and raise his eyebrows. He was started back on sedation overnight because he was having increased work of breathing. No seizure activity noted overnight.  Sedation was stopped today in rounds and patient is opening eyes, and following commands such as squeezing hands    Interval History 9/13/2021:  No acute changes overnight. Patient was extubated this morning at 0815. He is doing well with extubation. He is following commands and is trying to talk, but it very raspy after being extubated. Review of Systems   Review of Systems   Unable to perform ROS: Acuity of condition   Patient recently extubated and trying to talk, but is very congested/raspy     Medications   Scheduled Meds:    acyclovir  10 mg/kg (Ideal) IntraVENous Q8H    polyethylene glycol  17 g Oral Daily    senna  1 tablet Oral Nightly    docusate  100 mg Oral Daily    LORazepam  2 mg IntraVENous Once    levetiracetam  1,000 mg IntraVENous Q12H    midazolam  2 mg IntraVENous Once    prednisoLONE acetate  1 drop Right Eye 4 times per day    [Held by provider] QUEtiapine  25 mg Oral BID    [Held by provider] baclofen  20 mg Oral TID    calcium elemental  500 mg Oral BID    [Held by provider] lisinopril  20 mg Oral Daily    [Held by provider] magnesium oxide  400 mg Oral 4x Daily AC & HS    vitamin B-6  200 mg Oral Daily    [Held by provider] tamsulosin  0.4 mg Oral Daily    Vitamin D  1,000 Units Oral BID AC    zinc sulfate  50 mg Oral Daily    sodium chloride flush  5-40 mL IntraVENous 2 times per day     Continuous Infusions:    sodium chloride 50 mL/hr at 09/12/21 1233    niCARdipine 5 mg/hr (09/13/21 0148)    sodium chloride       PRN Meds: sodium chloride flush, sodium chloride, ondansetron **OR** ondansetron, acetaminophen **OR** acetaminophen, gabapentin, morphine    Past History    Past Medical History:   has a past medical history of Hypertension. Social History:   reports that he quit smoking about 51 years ago. His smoking use included cigarettes. He has a 10.00 pack-year smoking history. He has never used smokeless tobacco. He reports that he does not drink alcohol and does not use drugs.      Family History:   Family History   Problem Relation Age of Onset    High Blood Pressure Mother     No Known Problems Father Physical Examination      Vitals:  /87   Pulse 90   Temp 97.4 °F (36.3 °C) (Axillary)   Resp 12   Ht 5' 7\" (1.702 m)   Wt 213 lb 10 oz (96.9 kg)   SpO2 96%   BMI 33.46 kg/m²   Temp (24hrs), Av.5 °F (36.9 °C), Min:97.4 °F (36.3 °C), Max:99.1 °F (37.3 °C)      I/O (24Hr): Intake/Output Summary (Last 24 hours) at 2021 1117  Last data filed at 2021 0745  Gross per 24 hour   Intake 4426 ml   Output 3300 ml   Net 1126 ml       Physical Exam  Vitals reviewed. Constitutional:       Appearance: He is ill-appearing. HENT:      Head: Normocephalic and atraumatic. Right Ear: External ear normal.      Left Ear: External ear normal.      Mouth/Throat:      Mouth: Mucous membranes are moist.   Eyes:      General:         Right eye: Discharge present. Left eye: Discharge present. Pupils: Pupils are equal, round, and reactive to light. Comments: Conjunctiva injected bilaterally   Cardiovascular:      Rate and Rhythm: Normal rate and regular rhythm. Heart sounds: Normal heart sounds. No murmur heard. Pulmonary:      Breath sounds: Rhonchi present. Skin:     General: Skin is warm and dry. Findings: Rash present. Comments: Vesicular rash in right V1 distribution. Starting to crust over   Neurological:      Mental Status: He is alert. Deep Tendon Reflexes:      Reflex Scores:       Patellar reflexes are 2+ on the right side and 2+ on the left side. Achilles reflexes are 2+ on the right side and 2+ on the left side. Psychiatric:         Mood and Affect: Mood normal.       Neurologic Exam     Mental Status   Level of consciousness: alert  Mental status exam limited due to patient status  He is attempting to talk, says good morning. Is very raspy s/o et tube removal.      Cranial Nerves     CN III, IV, VI   Pupils are equal, round, and reactive to light. Right pupil: Size: 2 mm. Reactivity: sluggish. Left pupil: Size: 2 mm.  Reactivity: sluggish. CN VII   Facial expression full, symmetric. CN VIII   CN VIII normal.     CN IX, X   CN IX normal.     CN XII   CN XII normal.   Tongue deviation: none  CN exam limited due to patient condition     Motor Exam   Muscle bulk: normal  Overall muscle tone: normal  BUE: 5/5  BLE: 5/5     Sensory Exam   Not assessed due to patient condition     Gait, Coordination, and Reflexes     Reflexes   Right patellar: 2+  Left patellar: 2+  Right achilles: 2+  Left achilles: 2+  Gait and coordination deferred at this time due to patient condition         Labs/Imaging/Diagnostics   Labs:  CBC:  Recent Labs     09/11/21 0413 09/12/21 0446 09/13/21 0427   WBC 9.7 12.3* 10.4   RBC 4.82 4.52* 4.57*   HGB 13.9* 13.2* 13.2*   HCT 42.9 40.1* 40.9*   MCV 89.0 88.7 89.5    214 232     CHEMISTRIES:  Recent Labs     09/11/21 0413 09/11/21 0413 09/11/21  1615 09/12/21 0446 09/12/21  1815 09/13/21 0427      < > 139 134*  --  135   K 3.2*   < > 3.7 3.3* 3.6 3.7     --   --  98  --  99   CO2 24  --   --  24  --  25   BUN 19  --   --  18  --  19   CREATININE 1.9*  --   --  1.4*  --  0.9   GLUCOSE 161*  --   --  127*  --  113*   PHOS 3.1  --   --   --   --   --    MG 2.0  --   --  1.9  --   --     < > = values in this interval not displayed. PT/INR:No results for input(s): PROTIME, INR in the last 72 hours. APTT:No results for input(s): APTT in the last 72 hours. LIVER PROFILE:  No results for input(s): AST, ALT, BILIDIR, BILITOT, ALKPHOS in the last 72 hours. Imaging Last 24 Hours:  CT HEAD WO CONTRAST    Result Date: 9/7/2021  Exam: CT brain without contrast Comparison: None Clinical history: Acute mental status change Findings: The ventricles are normal in size and position. No intracranial masses or midline shift identified. No abnormal extra-axial fluid collections are seen. No acute intracranial hemorrhage. The visualized paranasal sinuses are clear. No skull fracture identified.  Nonspecific tissue swelling at the right forehead and in the right malar region. Impression: 1. No acute intracranial hemorrhage or process identified. 2. Nonspecific tissue swelling at the right forehead and in the right malar region. This document has been electronically signed by: Sal Reilly MD on 09/07/2021 11:02 PM All CTs at this facility use dose modulation techniques and iterative reconstructions, and/or weight-based dosing when appropriate to reduce radiation to a low as reasonably achievable. US RENAL COMPLETE    Result Date: 9/8/2021  BILATERAL RENAL ULTRASOUND: CLINICAL INFORMATION: Renal failure COMPARISON: No comparison available. TECHNIQUE: Multiple sonographic images of both kidneys were obtained. Images of the urinary bladder were also obtained. FINDINGS: RIGHT KIDNEY - 13.0 x 6.6 x 6.0 cm Resistive Index - 0.63 Cortical Thickness - 1.2 cm LEFT KIDNEY - 11.5 x 5.1 x 5.4 cm Resistive Index - 0.62 Cortical Thickness - 1.2 cm URINARY BLADDER catheter  KIDNEYS:  Both kidneys are normal in size and contour. The resistive indices are normal.  MASS/CYST: No solid or cystic lesion of either kidney is seen. HYDRONEPHROSIS:  There is no hydronephrosis. CALCULI:  No calculi are seen. BLADDER:  Urinary bladder empty with Hampton catheter in place. Moderate amount of sludge in the gallbladder. .     Normal renal ultrasound **This report has been created using voice recognition software. It may contain minor errors which are inherent in voice recognition technology. ** Final report electronically signed by Dr. Steff Richard on 9/8/2021 4:08 PM    XR CHEST PORTABLE    Result Date: 9/8/2021  PROCEDURE: XR CHEST PORTABLE CLINICAL INFORMATION: 52-year-old male who underwent endotracheal and nasogastric tube placement. COMPARISON: Chest x-ray 09/08/2021. TECHNIQUE: 3 AP semiupright views of the chest were obtained.  FINDINGS: There is a nasogastric tube coursing along the route of the esophagus and terminating in the stomach. There is a nasogastric tube approximately 6.5 cm from the stan. The cardiac silhouette and pulmonary vasculature are within normal limits. There is no significant pleural effusion or pneumothorax. Visualized portions of the upper abdomen are within normal limits. The osseous structures are intact. No acute fractures or suspicious osseous lesions. Interval placement of an endotracheal and nasogastric tube. Otherwise stable appearance of the chest when compared to the prior study. **This report has been created using voice recognition software. It may contain minor errors which are inherent in voice recognition technology. ** Final report electronically signed by Dr hCio Gaston on 9/8/2021 4:21 PM    XR CHEST PORTABLE    Result Date: 9/8/2021  PROCEDURE: XR CHEST PORTABLE CLINICAL INFORMATION: rhonci and obtunded. COMPARISON: No prior study. TECHNIQUE: AP upright view of the chest. FINDINGS: The heart size is at the upper limits of normal.The mediastinum is not widened. There are no pulmonary infiltrates or effusions. The pulmonary vascularity is normal. No suspicious osseous lesions are present. No acute cardiopulmonary process. **This report has been created using voice recognition software. It may contain minor errors which are inherent in voice recognition technology. ** Final report electronically signed by Dr. Clari Parker on 9/8/2021 7:56 AM    MRI BRAIN WO CONTRAST    Result Date: 9/8/2021  PROCEDURE: MRI BRAIN WO CONTRAST CLINICAL INFORMATION HCV. Unresponsive. HSV on the right forehead. Assess for HSV encephalitis. COMPARISON: Head CT 9/7/2021. TECHNIQUE: Multiplanar and multiple spin echo MRI images were obtained of the brain without contrast. FINDINGS: The diffusion-weighted images are normal.  The brain volume is mildly reduced. There is a mild amount of signal hyperintensity on the FLAIR and T2-weighted sequences in the white matter of the brain.  This is consistent with mild severity chronic small vessel ischemic changes. There is no abnormal signal in the temporal lobes. There is a small area of old infarct or volume loss in the periphery of the left cerebellum. There are no intra-or extra-axial collections. There is no hydrocephalus, midline shift or mass effect. There is mineralization in the medial aspects of the basal ganglia bilaterally. The major intracranial vascular flow voids are present. The midline craniocervical junction structures are normal.  The pituitary gland and brainstem are normal. There is some fullness of the nasal mucosa. There is some trace fluid in the left mastoid air cells. 1. No acute findings. 2. Mild severity chronic small vessel ischemic changes. **This report has been created using voice recognition software. It may contain minor errors which are inherent in voice recognition technology. ** Final report electronically signed by Dr. Neil Simons on 9/8/2021 11:48 AM    CXR 9/12/2021         Impression   1. Normal heart size. ET tube and NG tube in good position. Left subclavian dialysis catheter with tip at cavoatrial junction. 2. Mildly increased markings right perihilar region of both lung bases, consistent with mild atelectasis/pneumonia. Overall appearance slightly improved from prior.               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 WBC count down today from 12.3 to 10.4  CXR 9/12/2021 improving atelectasis  Sedation stopped, patient extubated today.  He is following commands and is very alert on exam today.  Neurology will sign off at this time. Please call with any needs or concerns. Patient reviewed with Dr. Brendan Meredith and he is in agreement with the assessment and plan.     Electronically signed by Peter Mar CNP on 9/13/21 at 1:48 PM EDT

## 2021-09-13 NOTE — PROGRESS NOTES
Speech states they will evaluate patient this afternoon due to patient having a continued weak cough and unable to clear own secretions

## 2021-09-13 NOTE — PROGRESS NOTES
Patient has been successfully weaned from Mechanical Ventilation. SpO2 of 94 on 30% FiO2. Patient extubated and placed on 4 liters/min via nasal cannula. Post extubation SpO2 is 96% with HR  83 bpm and RR 12 breaths/min. Patient had mild cough that was non-productive. Extubation Well tolerated by patient. Oneida Bhatti

## 2021-09-13 NOTE — PLAN OF CARE
Problem: RESPIRATORY  Goal: Will be able to breathe spontaneously, without ventilator support  Description: Will be able to breathe spontaneously, without ventilator support  Outcome: Completed  Note: Patient extubated

## 2021-09-13 NOTE — PROGRESS NOTES
[facility]   Nephrology progress note  9/13/2021, 11:04 AM      Pt Name:    Brennon Pickard  MRN:     [id number]     YOB: 1940  Admit Date:    9/6/2021 11:26 AM  Primary Care Physician:  Kierra Ledesma   Room number  4D-17/017-A    Chief Complaint: Nephrology following for DEANDRE    Subjective:  Patient seen and examined  Excellent urine output  Extubated    Objective:  24HR INTAKE/OUTPUT:      Intake/Output Summary (Last 24 hours) at 9/13/2021 1104  Last data filed at 9/13/2021 0745  Gross per 24 hour   Intake 4426 ml   Output 3300 ml   Net 1126 ml     I/O last 3 completed shifts: In: 2639 [I.V.:3086; NG/GT:1340]  Out: 3000 [Urine:3000]  I/O this shift:  In: -   Out: 300 [Urine:300]  Admission weight: 210 lb (95.3 kg)  Wt Readings from Last 3 Encounters:   09/11/21 213 lb 10 oz (96.9 kg)   09/05/21 210 lb (95.3 kg)   03/19/19 207 lb 3.2 oz (94 kg)     Body mass index is 33.46 kg/m².     Physical examination  VITALS:     Vitals:    09/13/21 0555 09/13/21 0600 09/13/21 0745 09/13/21 0815   BP:  134/87 126/87    Pulse: 84 84 90    Resp: 21 21 18 12   Temp:   97.4 °F (36.3 °C)    TempSrc:   Axillary    SpO2: 90% 97% 94% 96%   Weight:       Height:         General Appearance: Extubated  Neck: No JVD noted  GI: soft, non-tender, no guarding  Extremities: No significant pitting LE edema      Lab Data  CBC:   Recent Labs     09/11/21  0413 09/12/21  0446 09/13/21  0427   WBC 9.7 12.3* 10.4   HGB 13.9* 13.2* 13.2*   HCT 42.9 40.1* 40.9*    214 232     BMP:  Recent Labs     09/11/21  0413 09/11/21  0413 09/11/21  1615 09/12/21  0446 09/12/21  1815 09/13/21  0427      < > 139 134*  --  135   K 3.2*   < > 3.7 3.3* 3.6 3.7     --   --  98  --  99   CO2 24  --   --  24  --  25   BUN 19  --   --  18  --  19   CREATININE 1.9*  --   --  1.4*  --  0.9   GLUCOSE 161*  --   --  127*  --  113*   CALCIUM 9.3  --   --  8.9  --  9.1   MG 2.0  --   --  1.9  --   --    PHOS 3.1  --   --   -- --   --     < > = values in this interval not displayed. Hepatic:   No results for input(s): LABALBU, AST, ALT, ALB, BILITOT, ALKPHOS in the last 72 hours. Meds:  Infusion:    sodium chloride 50 mL/hr at 09/12/21 1233    niCARdipine 5 mg/hr (09/13/21 0148)    sodium chloride       Meds:    acyclovir  10 mg/kg (Ideal) IntraVENous Q8H    polyethylene glycol  17 g Oral Daily    senna  1 tablet Oral Nightly    docusate  100 mg Oral Daily    LORazepam  2 mg IntraVENous Once    levetiracetam  1,000 mg IntraVENous Q12H    midazolam  2 mg IntraVENous Once    prednisoLONE acetate  1 drop Right Eye 4 times per day    [Held by provider] QUEtiapine  25 mg Oral BID    [Held by provider] baclofen  20 mg Oral TID    calcium elemental  500 mg Oral BID    [Held by provider] lisinopril  20 mg Oral Daily    [Held by provider] magnesium oxide  400 mg Oral 4x Daily AC & HS    vitamin B-6  200 mg Oral Daily    [Held by provider] tamsulosin  0.4 mg Oral Daily    Vitamin D  1,000 Units Oral BID AC    zinc sulfate  50 mg Oral Daily    sodium chloride flush  5-40 mL IntraVENous 2 times per day     Meds prn: sodium chloride flush, sodium chloride, ondansetron **OR** ondansetron, acetaminophen **OR** acetaminophen, gabapentin, morphine       Impression and Plan:  1. DEANDRE: Nonoliguric, likely ATN  Had emergent hemodialysis treatment on Thursday out of concerns for acyclovir-induced neurotoxicity  Overall renal function stable at this time  No further need for renal replacement therapy  On isotonic saline    2. Hypokalemia. Improved  3. Hypernatremia. Resolved. Stable at this time  4. Altered mental status. 5.  Herpes zoster ophthalmicus  6. Mild metabolic acidosis: Resolved  7. Hypertension  8.   History of BPH    D/W RN    Parish Small MD  [facility]

## 2021-09-14 ENCOUNTER — APPOINTMENT (OUTPATIENT)
Dept: GENERAL RADIOLOGY | Age: 81
DRG: 124 | End: 2021-09-14
Payer: MEDICARE

## 2021-09-14 LAB
ANION GAP SERPL CALCULATED.3IONS-SCNC: 10 MEQ/L (ref 8–16)
ANION GAP SERPL CALCULATED.3IONS-SCNC: 13 MEQ/L (ref 8–16)
BASOPHILS # BLD: 0.5 %
BASOPHILS ABSOLUTE: 0 THOU/MM3 (ref 0–0.1)
BUN BLDV-MCNC: 21 MG/DL (ref 7–22)
BUN BLDV-MCNC: 23 MG/DL (ref 7–22)
C DIFF TOXIN/ANTIGEN: NEGATIVE
CALCIUM SERPL-MCNC: 8.7 MG/DL (ref 8.5–10.5)
CALCIUM SERPL-MCNC: 9.1 MG/DL (ref 8.5–10.5)
CHLORIDE BLD-SCNC: 102 MEQ/L (ref 98–111)
CHLORIDE BLD-SCNC: 105 MEQ/L (ref 98–111)
CO2: 25 MEQ/L (ref 23–33)
CO2: 25 MEQ/L (ref 23–33)
CREAT SERPL-MCNC: 1 MG/DL (ref 0.4–1.2)
CREAT SERPL-MCNC: 1.1 MG/DL (ref 0.4–1.2)
EOSINOPHIL # BLD: 3.5 %
EOSINOPHILS ABSOLUTE: 0.3 THOU/MM3 (ref 0–0.4)
ERYTHROCYTE [DISTWIDTH] IN BLOOD BY AUTOMATED COUNT: 14 % (ref 11.5–14.5)
ERYTHROCYTE [DISTWIDTH] IN BLOOD BY AUTOMATED COUNT: 44.8 FL (ref 35–45)
GFR SERPL CREATININE-BSD FRML MDRD: 64 ML/MIN/1.73M2
GFR SERPL CREATININE-BSD FRML MDRD: 72 ML/MIN/1.73M2
GLUCOSE BLD-MCNC: 118 MG/DL (ref 70–108)
GLUCOSE BLD-MCNC: 96 MG/DL (ref 70–108)
HCT VFR BLD CALC: 41.1 % (ref 42–52)
HEMOGLOBIN: 13.8 GM/DL (ref 14–18)
IMMATURE GRANS (ABS): 0.12 THOU/MM3 (ref 0–0.07)
IMMATURE GRANULOCYTES: 1.2 %
LYMPHOCYTES # BLD: 13.9 %
LYMPHOCYTES ABSOLUTE: 1.3 THOU/MM3 (ref 1–4.8)
MAGNESIUM: 1.8 MG/DL (ref 1.6–2.4)
MCH RBC QN AUTO: 29.9 PG (ref 26–33)
MCHC RBC AUTO-ENTMCNC: 33.6 GM/DL (ref 32.2–35.5)
MCV RBC AUTO: 89 FL (ref 80–94)
MONOCYTES # BLD: 9.8 %
MONOCYTES ABSOLUTE: 1 THOU/MM3 (ref 0.4–1.3)
NUCLEATED RED BLOOD CELLS: 0 /100 WBC
PLATELET # BLD: 258 THOU/MM3 (ref 130–400)
PMV BLD AUTO: 8.9 FL (ref 9.4–12.4)
POTASSIUM REFLEX MAGNESIUM: 3.3 MEQ/L (ref 3.5–5.2)
POTASSIUM SERPL-SCNC: 3.3 MEQ/L (ref 3.5–5.2)
RBC # BLD: 4.62 MILL/MM3 (ref 4.7–6.1)
SEG NEUTROPHILS: 71.1 %
SEGMENTED NEUTROPHILS ABSOLUTE COUNT: 6.9 THOU/MM3 (ref 1.8–7.7)
SODIUM BLD-SCNC: 140 MEQ/L (ref 135–145)
SODIUM BLD-SCNC: 140 MEQ/L (ref 135–145)
WBC # BLD: 9.7 THOU/MM3 (ref 4.8–10.8)

## 2021-09-14 PROCEDURE — 6360000002 HC RX W HCPCS: Performed by: INTERNAL MEDICINE

## 2021-09-14 PROCEDURE — 6360000002 HC RX W HCPCS: Performed by: STUDENT IN AN ORGANIZED HEALTH CARE EDUCATION/TRAINING PROGRAM

## 2021-09-14 PROCEDURE — 36415 COLL VENOUS BLD VENIPUNCTURE: CPT

## 2021-09-14 PROCEDURE — 2700000000 HC OXYGEN THERAPY PER DAY

## 2021-09-14 PROCEDURE — 2580000003 HC RX 258: Performed by: FAMILY MEDICINE

## 2021-09-14 PROCEDURE — 87449 NOS EACH ORGANISM AG IA: CPT

## 2021-09-14 PROCEDURE — 94761 N-INVAS EAR/PLS OXIMETRY MLT: CPT

## 2021-09-14 PROCEDURE — 87186 SC STD MICRODIL/AGAR DIL: CPT

## 2021-09-14 PROCEDURE — 6370000000 HC RX 637 (ALT 250 FOR IP): Performed by: FAMILY MEDICINE

## 2021-09-14 PROCEDURE — 2060000000 HC ICU INTERMEDIATE R&B

## 2021-09-14 PROCEDURE — 87147 CULTURE TYPE IMMUNOLOGIC: CPT

## 2021-09-14 PROCEDURE — 80048 BASIC METABOLIC PNL TOTAL CA: CPT

## 2021-09-14 PROCEDURE — 87070 CULTURE OTHR SPECIMN AEROBIC: CPT

## 2021-09-14 PROCEDURE — 85025 COMPLETE CBC W/AUTO DIFF WBC: CPT

## 2021-09-14 PROCEDURE — 2580000003 HC RX 258: Performed by: PHYSICIAN ASSISTANT

## 2021-09-14 PROCEDURE — 71045 X-RAY EXAM CHEST 1 VIEW: CPT

## 2021-09-14 PROCEDURE — 87077 CULTURE AEROBIC IDENTIFY: CPT

## 2021-09-14 PROCEDURE — 87205 SMEAR GRAM STAIN: CPT

## 2021-09-14 PROCEDURE — 6360000002 HC RX W HCPCS: Performed by: PHYSICIAN ASSISTANT

## 2021-09-14 PROCEDURE — 2580000003 HC RX 258: Performed by: INTERNAL MEDICINE

## 2021-09-14 PROCEDURE — 83735 ASSAY OF MAGNESIUM: CPT

## 2021-09-14 PROCEDURE — 99232 SBSQ HOSP IP/OBS MODERATE 35: CPT | Performed by: INTERNAL MEDICINE

## 2021-09-14 PROCEDURE — 6370000000 HC RX 637 (ALT 250 FOR IP): Performed by: STUDENT IN AN ORGANIZED HEALTH CARE EDUCATION/TRAINING PROGRAM

## 2021-09-14 PROCEDURE — 31720 CLEARANCE OF AIRWAYS: CPT

## 2021-09-14 PROCEDURE — 92610 EVALUATE SWALLOWING FUNCTION: CPT

## 2021-09-14 RX ORDER — POTASSIUM CHLORIDE 7.45 MG/ML
10 INJECTION INTRAVENOUS
Status: COMPLETED | OUTPATIENT
Start: 2021-09-14 | End: 2021-09-14

## 2021-09-14 RX ORDER — LISINOPRIL 10 MG/1
10 TABLET ORAL DAILY
Status: DISCONTINUED | OUTPATIENT
Start: 2021-09-15 | End: 2021-09-20 | Stop reason: HOSPADM

## 2021-09-14 RX ORDER — POTASSIUM CHLORIDE 29.8 MG/ML
20 INJECTION INTRAVENOUS
Status: DISCONTINUED | OUTPATIENT
Start: 2021-09-14 | End: 2021-09-14

## 2021-09-14 RX ORDER — POTASSIUM CHLORIDE 29.8 MG/ML
20 INJECTION INTRAVENOUS
Status: DISCONTINUED | OUTPATIENT
Start: 2021-09-14 | End: 2021-09-14 | Stop reason: SDUPTHER

## 2021-09-14 RX ADMIN — POTASSIUM CHLORIDE 10 MEQ: 7.46 INJECTION, SOLUTION INTRAVENOUS at 18:21

## 2021-09-14 RX ADMIN — POTASSIUM CHLORIDE 10 MEQ: 7.46 INJECTION, SOLUTION INTRAVENOUS at 17:06

## 2021-09-14 RX ADMIN — LEVETIRACETAM 1000 MG: 100 INJECTION, SOLUTION INTRAVENOUS at 10:06

## 2021-09-14 RX ADMIN — SODIUM CHLORIDE, PRESERVATIVE FREE 10 ML: 5 INJECTION INTRAVENOUS at 19:56

## 2021-09-14 RX ADMIN — PREDNISOLONE ACETATE 1 DROP: 10 SUSPENSION/ DROPS OPHTHALMIC at 18:22

## 2021-09-14 RX ADMIN — ACYCLOVIR SODIUM 650 MG: 50 INJECTION, SOLUTION INTRAVENOUS at 09:03

## 2021-09-14 RX ADMIN — PREDNISOLONE ACETATE 1 DROP: 10 SUSPENSION/ DROPS OPHTHALMIC at 23:56

## 2021-09-14 RX ADMIN — CALCIUM 500 MG: 500 TABLET ORAL at 19:29

## 2021-09-14 RX ADMIN — LEVETIRACETAM 1000 MG: 100 INJECTION, SOLUTION INTRAVENOUS at 21:41

## 2021-09-14 RX ADMIN — Medication 200 MG: at 10:20

## 2021-09-14 RX ADMIN — SODIUM CHLORIDE, PRESERVATIVE FREE 10 ML: 5 INJECTION INTRAVENOUS at 17:02

## 2021-09-14 RX ADMIN — SENNOSIDES 8.6 MG: 8.6 TABLET, COATED ORAL at 19:29

## 2021-09-14 RX ADMIN — Medication 1000 UNITS: at 16:53

## 2021-09-14 RX ADMIN — PREDNISOLONE ACETATE 1 DROP: 10 SUSPENSION/ DROPS OPHTHALMIC at 07:00

## 2021-09-14 RX ADMIN — CALCIUM 500 MG: 500 TABLET ORAL at 10:19

## 2021-09-14 RX ADMIN — SODIUM CHLORIDE: 9 INJECTION, SOLUTION INTRAVENOUS at 07:00

## 2021-09-14 RX ADMIN — ACYCLOVIR SODIUM 650 MG: 50 INJECTION, SOLUTION INTRAVENOUS at 23:57

## 2021-09-14 RX ADMIN — Medication 50 MG: at 10:20

## 2021-09-14 RX ADMIN — POTASSIUM CHLORIDE 10 MEQ: 7.46 INJECTION, SOLUTION INTRAVENOUS at 19:29

## 2021-09-14 RX ADMIN — TAMSULOSIN HYDROCHLORIDE 0.4 MG: 0.4 CAPSULE ORAL at 10:20

## 2021-09-14 RX ADMIN — SODIUM CHLORIDE, PRESERVATIVE FREE 10 ML: 5 INJECTION INTRAVENOUS at 09:09

## 2021-09-14 RX ADMIN — PREDNISOLONE ACETATE 1 DROP: 10 SUSPENSION/ DROPS OPHTHALMIC at 00:00

## 2021-09-14 RX ADMIN — PREDNISOLONE ACETATE 1 DROP: 10 SUSPENSION/ DROPS OPHTHALMIC at 11:59

## 2021-09-14 RX ADMIN — ACYCLOVIR SODIUM 650 MG: 50 INJECTION, SOLUTION INTRAVENOUS at 00:00

## 2021-09-14 RX ADMIN — Medication 1000 UNITS: at 10:19

## 2021-09-14 RX ADMIN — ACYCLOVIR SODIUM 650 MG: 50 INJECTION, SOLUTION INTRAVENOUS at 17:06

## 2021-09-14 RX ADMIN — POTASSIUM CHLORIDE 10 MEQ: 7.46 INJECTION, SOLUTION INTRAVENOUS at 15:29

## 2021-09-14 ASSESSMENT — PAIN SCALES - GENERAL
PAINLEVEL_OUTOF10: 0
PAINLEVEL_OUTOF10: 0

## 2021-09-14 NOTE — PROGRESS NOTES
Pt transferred from ICU to . Pt is resting in bed comfortably with eyes open. Pt SCDs plugged in. Pt denies any needs at this time. Bed alarm on. Call light, bedside table and possessions are in reach.  Gurmeet OLSEN/PEDRO

## 2021-09-14 NOTE — PROGRESS NOTES
Comprehensive Nutrition Assessment    Type and Reason for Visit:  Reassess (TF check)    Nutrition Recommendations/Plan:   Diet per Dr & SLP. Recommend Probiotic. Plan magic cup start bid. Yogurt bid  Consider Vitamin B12 check. Nutrition Assessment:       Pt. nutritionally compromised AEB extubated yesterday 9/13, need for swallow evaluation today, weakness,  but plan diet start today.   At risk for further nutrition compromise r/t respiratory failure, s/p code blue 9/8, intubated 9/8 ( extubated 9/13), acute encephalopathy secondary to herpes virus, DEANDRE with HD needed this admit and underlying medical condition (hx HTN).  Nutrition recommendations/interventions as per above. Malnutrition Assessment:  Malnutrition Status:  Insufficient data    Context:  Acute Illness     Findings of the 6 clinical characteristics of malnutrition:  Energy Intake:  1 - 75% or less of estimated energy requirements for 7 or more days  Weight Loss:  Unable to assess     Body Fat Loss:  Unable to assess     Muscle Mass Loss:  Unable to assess    Fluid Accumulation:  Unable to assess     Strength:  Not Performed    Estimated Daily Nutrient Needs:  Energy (kcal):  5159-3030 kcals (15-18); Weight Used for Energy Requirements:   (97 kgm)     Protein (g):   gm (1.2-1.5); Weight Used for Protein Requirements:  Ideal (67 kgm)        Fluid (ml/day):  per MD; Method Used for Fluid Requirements:  Other (Comment)      Nutrition Related Findings:     Pt extubated yesterday & TF stopped. Pt did received 85% or prescribed TF 9/12. Swallow evaluation done today & plan diet start . Per Steffen Aceves SLP plan cognative evaluation as well. Pt with weakness & some confusion. Per Lupillo Vasques RN pt has not slept for awhile  & request melatonin or help with sleep in team rounds this afternoon. Per RN  & SLP pt  with cough with thick sputum. ID consulted. 7 watery stools recorded yesterday & per Lupillo Vasques RN pt is Cdiff neagtive & bm meds held.  0.9 at 50 ml/hr. K+ 3.3- replacement , BUN 21, Cr 1.1, Hgb 13. 8. meds include ATB, Vitamin B6, D, Oscal & zinc.  plan magic cup bid at this time. Added yogurt bid     Wounds:  None       Current Nutrition Therapies:    ADULT DIET; Dysphagia - Soft and Bite Sized  1:1 assist for meals d/t weakness and confusion, Meds whole in puree. Magic cup bid   Anthropometric Measures:  · Height: 5' 7\" (170.2 cm)  · Current Body Weight: 213 lb 10 oz (96.9 kg) (9/9 +scleral edema)   · Admission Body Weight: 205 lb 8 oz (93.2 kg) (9/8 +scleral edema)    · Usual Body Weight:  (per EMR: 9/5/21: 210# stated weight)     · Ideal Body Weight: 148 lbs;     · BMI: 33.5   · BMI Categories: Obese Class 1 (BMI 30.0-34. 9)       Nutrition Diagnosis:   · Inadequate oral intake related to cognitive or neurological impairment, swallowing difficulty (extubated 9/13) as evidenced by swallow study results      Nutrition Interventions:   Food and/or Nutrient Delivery:   (diet per Dr & SLP)  Nutrition Education/Counseling:  Education not appropriate   Coordination of Nutrition Care:  Continue to monitor while inpatient, Interdisciplinary Rounds    Goals:  TF to provide 75% or more of estimated nutrition needs until able to transition to po diet. Nutrition Monitoring and Evaluation:   Behavioral-Environmental Outcomes:  None Identified   Food/Nutrient Intake Outcomes:  Enteral Nutrition Intake/Tolerance  Physical Signs/Symptoms Outcomes:  Biochemical Data, Chewing or Swallowing, GI Status, Fluid Status or Edema, Hemodynamic Status, Nutrition Focused Physical Findings, Skin, Weight     Discharge Planning:     Too soon to determine     Electronically signed by Ivana Billy RD, LD on 9/14/21 at 1:58 PM EDT    Contact: [phone]

## 2021-09-14 NOTE — PROGRESS NOTES
[address] ICU 4D  Clinical Swallow Evaluation      SLP Individual Minutes  Time In: 0940  Time Out: 3726  Minutes: 12  Timed Code Treatment Minutes: 0 Minutes       Date: 2021  Patient Name: Rao Wyman      CSN: [id number]   : 1940  ([de-identified] y.o.)  Gender: male   Referring Physician:  Tanvir Fang DO  Diagnosis: herpes zoster ophthalmicus  Secondary Diagnosis: dysphagia and cognitive impairment    History of Present Illness/Injury: Per chart review; Sarah Beth Wallis is a [de-identified] y/o male who was admitted to [facility] with the above medical dx. The pt was referred for ST evaluation and CSE following . prolonged intubation  Past Medical History:   Diagnosis Date    Hypertension        SUBJECTIVE:  Patient seen upright in recliner chair pleasant, agreeable, and slightly confused for CSE on this date. No family present on this date. OBJECTIVE:    Pain:  No pain reported. Current Diet: NPO until CSE     Respiratory Status:  Nasal Canula and 5 L/min     Behavioral Observation:  Alert, Confused and Dysarthric    Oral Mechanism Evaluation:      Facial / Labial Impaired Mild decreased strength and ROM    Lingual Impaired Mild decreased strength and ROM    Dentition WFL    Velum WFL    Vocal Quality Impaired    Sensation Not Tested    Cough WFL      Patient Evaluated Using: Thin via ice, tsp, cup, straw, puree, and soft solids     Oral Phase:  Impaired:  Impaired Mastication    Pharyngeal Phase: WFL:  Pharyngeal phase appears WFL but cannot rule out pharyngeal phase deficits from a bedside swallowing evaluation alone. Signs and Symptoms of Laryngeal Penetration/Aspiration: Delayed Cough x1 following initial presentation of thin liquids via tsp, no additional s/s. Impresssions: Patient presents with mild oral dysphagia with inability to fully discern potential presence of pharyngeal phase deficits without formal instrumentation.  Oral impairment characterized by mild decreased labial seal and decreased rounding with ST controlled cup drinks, resulting in anterior loss of liquids (x1) during PO trials. Adequate ability to procure via straw and spoon. Slightly prolonged and incoordination of mastication, however, functional bolus control/formation and AP movement with consistent oral clearance. AS aforementioned, pt presented with a mild delayed cough at the start of the assessment, however, vocal quality remained clear, and no additional s/s of aspiration/penetration given all additional PO trials. PT reported occasional coughing post extubation, resulting in excretion of sputum from the lungs. PT denied coughing related to swallowing and/or concerns for airway invasion. Pharyngeal phase appears timely and adequate upon tactile palpation, and demonstrating the ability to safely consume increased amounts of PO with ST. Recommended diet upgrade to soft & bite size solids with thin liquids, 1:1 assist d/t physical and cognitive limitations, and use of compensatory swallowing strategies (single bites/drinks, alternating liquids/solids). *post evaluation, patient without respiratory distress upon leaving room; RN Delisa Pabon notified re: clinical findings and recommendations from the assessment; verbal receptiveness noted       Education: Completed review and education of the following safe swallowing strategies/precautions--  *Upright  *Single bites/drinks  *Alternate liquids/solids  *Straw Okay   *Fully Masticate Solids  *SLOW PO intake  *Meds whole in puree  *Stop PO intake if coughing and/or changes in respiratory status  *Direct SUP  *pt verbalized understanding and agreement on this date. *reinforcement of strategies recommended given obvious confusion and random verbal comments throughout session. **monitor for instrumental is decline in swallow or pulmonary status. RECOMMENDATIONS/ASSESSMENT:  Instrumental Evaluation: Instrumental evaluation not indicated at this time.   Diet Recommendations:  Soft & bite size with thin liquids, whole in pure   Strategies:  Full Upright Position, Small Bite/Sip, Pulmonary Monitoring, Medications Whole with Puree, Direct 1:1 Supervision, Alternate Solids and Liquids, Limit Distractions and Monitor for Fatigue   Rehabilitation Potential: good    EDUCATION:  Learner: Patient  Education:  Reviewed results and recommendations of this evaluation, Reviewed diet and strategies, Reviewed signs, symptoms and risks of aspiration, Reviewed ST goals and Plan of Care and Reviewed recommendations for follow-up  Evaluation of Education: Verbalizes understanding, Needs further instruction and Family not present    PLAN:  Speech Therapy evaluation to assess speech, language, cognition and/or voice and Skilled SLP intervention on acute care 3-5 x per week or until goals met and/or pt plateaus in function. Specific interventions for next session may include: Cog Eval/MOCA . PATIENT GOAL:    Return to least restrictive diet. SHORT TERM GOALS:  Short-term Goals  Timeframe for Short-term Goals: 2 weeks  Goal 1: Patient will safely consume soft & bite size solids with thin liquid (1:1 assist) with no overt s/s of aspiration/penetration to demosntrate the ability to meet nutrition/hydrational needs. Goal 2: Patient will complete advanced PO trials of regular solids with timely oral phase of the swallow with no overt s/s of aspiration/penetration to demosntrate readiness for a diet upgrade. Goal 3: Patient will complete cognitive assessment s medically appropriate    LONG TERM GOALS:  No LTM Goals recommended, due to anticipated short ELOS. Tj Johnson MA., CCC-SLP

## 2021-09-14 NOTE — PROGRESS NOTES
Progress note: Infectious diseases    Patient - Song Youngblood,  Age - [de-identified] y.o.    - 1940      Room Number - 4D-17/017-A   MRN -  [id number]   Acct # - [de-identified]  Date of Admission -  2021 11:26 AM    SUBJECTIVE:   Per nursing staff the patient continues to have thick mucus production and weak cough. The patient is more alert and has increased cough effort and stated that he is feeling like he is improving. He denies fever, chest pain, shortness of breath, abdominal pain, nausea or vomiting. He admits to having brown sputum production, diarrhea, and   weakness. OBJECTIVE   VITALS    height is 5' 7\" (1.702 m) and weight is 213 lb 10 oz (96.9 kg). His oral temperature is 98.9 °F (37.2 °C). His blood pressure is 124/95 (abnormal) and his pulse is 87. His respiration is 20 and oxygen saturation is 93%. Wt Readings from Last 3 Encounters:   21 213 lb 10 oz (96.9 kg)   21 210 lb (95.3 kg)   19 207 lb 3.2 oz (94 kg)       I/O (24 Hours)    Intake/Output Summary (Last 24 hours) at 2021 8368  Last data filed at 2021 0501  Gross per 24 hour   Intake 2427 ml   Output 3800 ml   Net -1373 ml       General Appearance:  Awake, alert, oriented,  Mild respiratory distress  HEENT - normocephalic, atraumatic, pink conjunctiva,  anicteric sclera, VZV rash on right face in v1 distribution with scabbing of vesicles  Neck - Supple, no mass  Lungs -  Bilateral good air entry, (+) rhonchi, (+) wheeze  Cardiovascular - Heart sounds are normal.  Regular rate and rhythm without murmur, gallop or rub. Abdomen - soft, not distended, nontender, bowel sounds present in all quadrants   Neurologic - No focal deficits  Skin - No bruising or bleeding.  Vesicles appearing on lower extremity skin,   Extremities - No edema, no cyanosis, clubbing   He has greenish discharge from around he urethra  MEDICATIONS:      acyclovir  10 mg/kg (Ideal) IntraVENous Q8H    polyethylene glycol  17 g Oral Daily    senna 1 tablet Oral Nightly    docusate  100 mg Oral Daily    LORazepam  2 mg IntraVENous Once    levetiracetam  1,000 mg IntraVENous Q12H    midazolam  2 mg IntraVENous Once    prednisoLONE acetate  1 drop Right Eye 4 times per day    [Held by provider] QUEtiapine  25 mg Oral BID    [Held by provider] baclofen  20 mg Oral TID    calcium elemental  500 mg Oral BID    [Held by provider] lisinopril  20 mg Oral Daily    [Held by provider] magnesium oxide  400 mg Oral 4x Daily AC & HS    vitamin B-6  200 mg Oral Daily    tamsulosin  0.4 mg Oral Daily    Vitamin D  1,000 Units Oral BID AC    zinc sulfate  50 mg Oral Daily    sodium chloride flush  5-40 mL IntraVENous 2 times per day      sodium chloride 50 mL/hr at 09/12/21 1233    niCARdipine Stopped (09/14/21 0100)    sodium chloride       sodium chloride flush, sodium chloride, ondansetron **OR** ondansetron, acetaminophen **OR** acetaminophen, gabapentin, morphine      LABS:     CBC:   Recent Labs     09/12/21 0446 09/13/21 0427 09/14/21  0749   WBC 12.3* 10.4 9.7   HGB 13.2* 13.2* 13.8*    232 258     BMP:    Recent Labs     09/11/21  1615 09/12/21 0446 09/12/21  1815 09/13/21 0427 09/14/21  0749   NA  --  134*  --  135 140   K   < > 3.3* 3.6 3.7 3.3*   CL  --  98  --  99 102   CO2  --  24  --  25 25   BUN  --  18  --  19 21   CREATININE  --  1.4*  --  0.9 1.1   GLUCOSE  --  127*  --  113* 96    < > = values in this interval not displayed. Calcium:  Recent Labs     09/14/21  0749   CALCIUM 9.1     Ionized Calcium:No results for input(s): IONCA in the last 72 hours. Magnesium:  Recent Labs     09/12/21  0446   MG 1.9     Phosphorus:  No results for input(s): PHOS in the last 72 hours. CULTURES:   UA: No results for input(s): SPECGRAV, PHUR, COLORU, CLARITYU, MUCUS, PROTEINU, BLOODU, RBCUA, WBCUA, BACTERIA, NITRU, GLUCOSEU, BILIRUBINUR, UROBILINOGEN, KETUA, LABCAST, LABCASTTY, AMORPHOS in the last 72 hours.     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    Encephalopathy G93.40         ASSESSMENT/PLAN     Herpes zoster ophthalmicus - right V1 distribution with complete crusting of vesicles. There appears to be vesicles on the skin indicating dissemination.  - Continue Acyclovir dose adjusted due to improvement of the renal function    Acute renal failure - Improving. Likely secondary to acyclovir nephrotoxicity. US renal normal. Patient received HD (9/9/21) with 0 net ultrafiltration. Currently having excellent urin output with Cr back to baseline. GFR is steadily increasing.  - Nephrology following with continuation of hemodialysis as indicated    Asymptomatic UTI - Culture demonstrating Group D enterococcus faecalis. - No indication for ABx treatment. Acute hypoxic respiratory failure - Extubated this morning 9/13/21. Still experiencing intermittent oxygen desaturations. - Continue to wean supplemental oxygen to maintain SpO2>90%. ? Urethritis - likely related to mares. Wife reports he had hx of incontinence and had discharge prior to admission :yellow/green discharge from urethra with Mares catheter in place. Suspicion for non gonococcal (no hx of STD ) vs. catheter irritation.  - Swab, culture, and gram stain    Electronically signed by Pari Olivera DO on 9/14/2021 at 8:33 AM   Patient was seen and examined face-to-face by me  The chart, progress notes, labs and radiographs were reviewed. Case discussed with the nurse.  Questions and concerns were addressed. I agree with the progress note.

## 2021-09-14 NOTE — CARE COORDINATION
Discharge Planning Update:    Hospitalist, neuro, nephro, ID following. Extubated 9/13. PT/OT/SLP ordered. Did have HD on 9/7 r/t concern for acyclovir-induced neurotoxicity. No further need for HD. IVF. Cardene gtt stopped. IV keppra. IV acyclovir. From home with spouse and Altagraciaranabel Dumont, await therapy evals for further recommendations. Report given to 4A SANDRA.

## 2021-09-14 NOTE — PROGRESS NOTES
[facility] CRIT[facility] PROGRESS NOTE      Patient:  Sarbjit Gaona    Unit/Bed:4D-17/017-A  YOB: 1940  MRN: [id number]   PCP: Garrett Siegel  Date of Admission: 9/6/2021  Chief Complaint:- right eye pain and swelling     Assessment and Plan:      Acute Encephalopathy: Improving; Due to Herpes Zoster Virus Encephalitis. CT of the head showed no acute intracranial hemorrhage or process in the brain. MRI of the brain shows no acute findings and mild severity chronic small vessel ischemic changes. LP performed on 9/8 positive for +Varicella-Zoster. Varicella-Zoster ophthalmicus: Rash is on the right side of the face/head in the V1 distribution. No history of VZV vaccine. Patient saw ophthalmology and recommends prednisone eyedrops every 6 hours until next appointment 9/20. Acyclovir day 9. Infectious disease following. Seizure:  No hx of seizures. Neurology notified. EEG showed periodic lateralized discharges epileptogenic in nature indicates seizure tendency in proper clinical context. On Keppra 1 g twice daily IV daily. Neurology following   Enterococcus group D Asymptomatic UTI:  Urine culture grew greater than 100,000 CFU. Discontinued by infectious disease  BPH: resume flomax. HTN: lisinopril held secondary to DEANDRE; patient weaned of cardene gtt; pressures labile currently; resume lisinopril 10 mg   Acute Hypoxic Respiratory Failure: Secondary to Infection. Patient also was very altered and concern for protecting his own airway. Currently requiring minimal pressure support and peep. Lung protective strategies by keeping Peak pressure <35 and Plateau Pressure <82. Patient is off sedation however is currently only responding to painful stimuli. Resolved. Acute renal failure: Worsening. Secondary to Acyclovir. Developed DEANDRE on 9/7 and patient was started on normal saline 125 mL/h continuous. All nephrotoxic agents are held. Patient is still making urine. Avoid further hypotension.  Renally dosed meds. S/p hemo dialysis. Resolved. Hypotension:  Secondary to infection. Lisinopril on hold due to DEANDRE. Resolved. INITIAL H AND P AND ICU COURSE:  Nicole Boston is an 44-year-old white male who presented to [facility] on 9/6/2021 with complaints of right eye swelling and pain. He has a past medical history of reformed smoker hypertension and BPH. Per report patient had been having eye swelling for the past 2 to 3 days. He tried Motrin and applied warm clots. He found mild relief. He did have some photophobia and a small rash. This continued to worsen and began to itch. He came to the ER on 9/5 was found to have corneal abrasions and bacterial conjunctivitis. He was given gentamicin drops for his eyes and was told to follow-up with Dr. Neredya Stapleton. He returned to the ER with worsening symptoms. He was started on IV acyclovir with concern for herpes zoster ophthalmicus. He was initially admitted to stepdown. Patient then became hypotensive on stepdown on 9/7. He was a rapid response. CT head was obtained due to altered mental status with no changes. On 9/8 there was a CODE BLUE. Patient was intubated. He was altered and hypoxic. He was transferred to ICU for further management. Spinal tap was completed which was positive for HSV. Past Medical History: Per HPI  Family History: Mother: Hypertension  Social History: Reformed smoker, denies alcohol use, denies drug use.     ROS   Intubated and sedated on mechanical ventilation    Scheduled Meds:   potassium chloride  20 mEq IntraVENous Q2H    acyclovir  10 mg/kg (Ideal) IntraVENous Q8H    polyethylene glycol  17 g Oral Daily    senna  1 tablet Oral Nightly    docusate  100 mg Oral Daily    LORazepam  2 mg IntraVENous Once    levetiracetam  1,000 mg IntraVENous Q12H    midazolam  2 mg IntraVENous Once    prednisoLONE acetate  1 drop Right Eye 4 times per day    [Held by provider] QUEtiapine  25 mg Oral BID    [Held by provider] lungs.  MRI 9/8/2021 reports no acute findings  Renal ultrasound 9/8/2021 reports normal ultrasound of kidneys  Chest x-ray done on 9/12 shows mildly increased markings right perihilar region of both lung bases consistent with atelectasis/pneumonia. Overall appears slightly improved from prior. Meets Continued ICU Level Care Criteria:    [] Yes   [x] No - Transfer Planned to listed location:  [x] HOSPITALIST CONTACTED- DR. Bhardwaj    Case and plan discussed with Dr. Adalid Garibay. Electronically signed by Vik Aguilar DO  CRITICAL CARE SPECIALIST  Patient seen by me. Case discussed with resident physician. Okay to transfer to medical floor. Italicized font represents changes to the note made by me. CC time 35 minutes. Time was discontiguous. Time does not include procedures. Time does include my direct assessment of the patient and coordination of care.   Electronically signed by Jesse Blue MD on 9/14/2021 at 5:45 PM

## 2021-09-14 NOTE — PROGRESS NOTES
[facility]   Nephrology progress note  9/14/2021, 11:44 AM      Pt Name:    Ofelia Diamond  MRN:     [id number]     YOB: 1940  Admit Date:    9/6/2021 11:26 AM  Primary Care Physician:  Anabell Freed   Room number  4D-17/017-A    Chief Complaint: Nephrology following for DEANDRE    Subjective:  Patient seen and examined  Urine output 4,100 ml last 24 hours  Patient received 1 dose of Lasix 40 mg IV yesterday  Potassium this morning is decreased at 3.3. Creatinine 1.1  Cardene drip stopped overnight. BP with mildly elevated diastolic readings  this morning. No acute concerns this morning. Objective:  24HR INTAKE/OUTPUT:      Intake/Output Summary (Last 24 hours) at 9/14/2021 1144  Last data filed at 9/14/2021 0501  Gross per 24 hour   Intake 2427 ml   Output 3800 ml   Net -1373 ml     I/O last 3 completed shifts: In: 2427 [I.V.:2427]  Out: 4100 [Urine:4100]  No intake/output data recorded. Admission weight: 210 lb (95.3 kg)  Wt Readings from Last 3 Encounters:   09/11/21 213 lb 10 oz (96.9 kg)   09/05/21 210 lb (95.3 kg)   03/19/19 207 lb 3.2 oz (94 kg)     Body mass index is 33.46 kg/m².     Physical examination  VITALS:     Vitals:    09/14/21 0900 09/14/21 0930 09/14/21 1000 09/14/21 1030   BP: (!) 125/95 (!) 120/93 (!) 122/105 (!) 129/93   Pulse: 101 96 103 103   Resp: 16 16 18 18   Temp:       TempSrc:       SpO2: 91%  93%    Weight:       Height:         General Appearance: Extubated, resting in bed in no distress with nasal cannula in place  Neck: No JVD noted  GI: soft, non-tender, no guarding  Extremities: No significant pitting LE edema      Lab Data  CBC:   Recent Labs     09/12/21  0446 09/13/21  0427 09/14/21  0749   WBC 12.3* 10.4 9.7   HGB 13.2* 13.2* 13.8*   HCT 40.1* 40.9* 41.1*    232 258     BMP:  Recent Labs     09/11/21  1615 09/12/21  0446 09/12/21  1815 09/13/21  0427 09/14/21  0749   NA  --  134*  --  135 140   K   < > 3.3* 3.6 3.7 3.3*   CL --  98  --  99 102   CO2  --  24  --  25 25   BUN  --  18  --  19 21   CREATININE  --  1.4*  --  0.9 1.1   GLUCOSE  --  127*  --  113* 96   CALCIUM  --  8.9  --  9.1 9.1   MG  --  1.9  --   --  1.8    < > = values in this interval not displayed. Hepatic:   No results for input(s): LABALBU, AST, ALT, ALB, BILITOT, ALKPHOS in the last 72 hours. Meds:  Infusion:    sodium chloride 50 mL/hr at 09/14/21 0700    niCARdipine Stopped (09/14/21 0100)    sodium chloride       Meds:    potassium chloride  20 mEq IntraVENous Q2H    acyclovir  10 mg/kg (Ideal) IntraVENous Q8H    polyethylene glycol  17 g Oral Daily    senna  1 tablet Oral Nightly    docusate  100 mg Oral Daily    LORazepam  2 mg IntraVENous Once    levetiracetam  1,000 mg IntraVENous Q12H    midazolam  2 mg IntraVENous Once    prednisoLONE acetate  1 drop Right Eye 4 times per day    [Held by provider] QUEtiapine  25 mg Oral BID    [Held by provider] baclofen  20 mg Oral TID    calcium elemental  500 mg Oral BID    [Held by provider] lisinopril  20 mg Oral Daily    [Held by provider] magnesium oxide  400 mg Oral 4x Daily AC & HS    vitamin B-6  200 mg Oral Daily    tamsulosin  0.4 mg Oral Daily    Vitamin D  1,000 Units Oral BID AC    zinc sulfate  50 mg Oral Daily    sodium chloride flush  5-40 mL IntraVENous 2 times per day     Meds prn: sodium chloride flush, sodium chloride, ondansetron **OR** ondansetron, acetaminophen **OR** acetaminophen, gabapentin, morphine       Impression and Plan:  1. DEANDRE: Nonoliguric, likely ATN  Had emergent hemodialysis treatment on Thursday out of concerns for acyclovir-induced neurotoxicity  Urine output 4,100 ml in last 24 hours, likely due to Lasix dose  Creatinine 1.1 today. Overall renal function stable at this time  No further need for renal replacement therapy  Continue isotonic saline infusion    2. Hypokalemia. 3.3 today. Possibly secondary to 1 time dose of Lasix yesterday.  Will Replace with 20 mEq KCl IV x2 doses via central line  3. Hypernatremia. Resolved. Stable at this time. 4.  Altered mental status. 5.  Herpes zoster ophthalmicus  6. Mild metabolic acidosis: Resolved  7. Hypertension: Continue to monitor for now  8.   History of BPH    D/W RN    Electronically signed by Giovani Beltran MD on 9/14/2021 at 11:44 AM

## 2021-09-14 NOTE — PROGRESS NOTES
Lung sounds are better than before lasix. Voice also clearing and not as wet sounding. Does c/o of some nausea & is given iv zofran. Pt is able to produce small amount of sputum orally.

## 2021-09-14 NOTE — PROGRESS NOTES
2330 Assessment complete as charted. Incontinent watery, seedy stool. Answers questions appropriately. Denies pain at this time. Coughing thick, tan sputum, reports vomiting but expectorating sputum, no emesis at this time. Oral suction completed. 0420 multiple liquid, watery stools. Productive cough with thick, tan sputum. Oral-phayrngeal suction moderate amount thin, clear with thick tan mixed sputum.

## 2021-09-14 NOTE — FLOWSHEET NOTE
09/14/21 1015   Encounter Summary   Services provided to: Patient and family together   Referral/Consult From: [facility]   Support System Spouse   Continue Visiting Yes  (9/14)   Complexity of Encounter Low   Length of Encounter 15 minutes   Routine   Type Follow up   Assessment Approachable   Intervention Nurtured hope   Outcome Comfort   Assessment: In my encounter with the [de-identified] yr old patient the pts family was supportively present. While rounding the unit 4D,  I provided spiritual care to patient and their family through conversation, I also came to assess their spiritual needs present. The pt was admitted due to herpes zoster ophthalmicus/ infection. Interventions:  I provided, prayer, emotional support and words of comfort.  provided a listening presence and encouraged pt to share their beliefs and how they support him during their hospitalization. Outcomes: The patient was encouraged and didnt share any further spiritual needs at this time. The pt remains optimistic and hopeful. The pt shared that they were appreciative for the support. Plan:  Chaplains will follow-up at a later time for assessment of any spiritual care needs present.

## 2021-09-14 NOTE — PLAN OF CARE
Patient transferred from ICU to [facility]. Received handoff from Dr. Saint Jakes. Brief Note:     Presented to [facility] on 09/06/2021 with complaints of right eye swelling and pain. Noted to have some photophobia and a small rash. Started on IV acyclovir with concern for herpes zoster ophthalmicus. A rapid response called on 09/07 after patient became hypotensive with altered mental status. CT head obtained showed no acute changes. On 09/08, ASHLEY PERALTA was called with patient being subsequently intubated and transferred to the ICU. LP performed on 09/08 positive for varicella-zoster. Patient had emergent hemodialysis treatment on 09/09 out of concerns for acyclovir-induced neurotoxicity and DEANDRE. Patient also developed myoclonic jerking of his upper extremities and was started on Keppra for seizure activity. Extubated on 09/13. Overall renal function is improved. Creatinine down to 0.9 with good urine output. Will continue on IV acyclovir with ID following.

## 2021-09-15 LAB
ANION GAP SERPL CALCULATED.3IONS-SCNC: 13 MEQ/L (ref 8–16)
BASOPHILS # BLD: 0.3 %
BASOPHILS ABSOLUTE: 0 THOU/MM3 (ref 0–0.1)
BUN BLDV-MCNC: 23 MG/DL (ref 7–22)
CALCIUM SERPL-MCNC: 9.3 MG/DL (ref 8.5–10.5)
CHLORIDE BLD-SCNC: 104 MEQ/L (ref 98–111)
CO2: 23 MEQ/L (ref 23–33)
CREAT SERPL-MCNC: 1.1 MG/DL (ref 0.4–1.2)
EOSINOPHIL # BLD: 1.1 %
EOSINOPHILS ABSOLUTE: 0.1 THOU/MM3 (ref 0–0.4)
ERYTHROCYTE [DISTWIDTH] IN BLOOD BY AUTOMATED COUNT: 13.8 % (ref 11.5–14.5)
ERYTHROCYTE [DISTWIDTH] IN BLOOD BY AUTOMATED COUNT: 46.3 FL (ref 35–45)
GFR SERPL CREATININE-BSD FRML MDRD: 64 ML/MIN/1.73M2
GLUCOSE BLD-MCNC: 121 MG/DL (ref 70–108)
HCT VFR BLD CALC: 39.6 % (ref 42–52)
HEMOGLOBIN: 12.5 GM/DL (ref 14–18)
IMMATURE GRANS (ABS): 0.14 THOU/MM3 (ref 0–0.07)
IMMATURE GRANULOCYTES: 1.2 %
LYMPHOCYTES # BLD: 11.6 %
LYMPHOCYTES ABSOLUTE: 1.3 THOU/MM3 (ref 1–4.8)
MAGNESIUM: 1.8 MG/DL (ref 1.6–2.4)
MCH RBC QN AUTO: 28.9 PG (ref 26–33)
MCHC RBC AUTO-ENTMCNC: 31.6 GM/DL (ref 32.2–35.5)
MCV RBC AUTO: 91.5 FL (ref 80–94)
MONOCYTES # BLD: 10.5 %
MONOCYTES ABSOLUTE: 1.2 THOU/MM3 (ref 0.4–1.3)
NUCLEATED RED BLOOD CELLS: 0 /100 WBC
PLATELET # BLD: 256 THOU/MM3 (ref 130–400)
PMV BLD AUTO: 8.8 FL (ref 9.4–12.4)
POTASSIUM REFLEX MAGNESIUM: 3.3 MEQ/L (ref 3.5–5.2)
RBC # BLD: 4.33 MILL/MM3 (ref 4.7–6.1)
SEG NEUTROPHILS: 75.3 %
SEGMENTED NEUTROPHILS ABSOLUTE COUNT: 8.7 THOU/MM3 (ref 1.8–7.7)
SODIUM BLD-SCNC: 140 MEQ/L (ref 135–145)
WBC # BLD: 11.6 THOU/MM3 (ref 4.8–10.8)

## 2021-09-15 PROCEDURE — 2060000000 HC ICU INTERMEDIATE R&B

## 2021-09-15 PROCEDURE — 97167 OT EVAL HIGH COMPLEX 60 MIN: CPT

## 2021-09-15 PROCEDURE — 6360000002 HC RX W HCPCS: Performed by: HOSPITALIST

## 2021-09-15 PROCEDURE — 2580000003 HC RX 258: Performed by: FAMILY MEDICINE

## 2021-09-15 PROCEDURE — 99232 SBSQ HOSP IP/OBS MODERATE 35: CPT | Performed by: HOSPITALIST

## 2021-09-15 PROCEDURE — 97535 SELF CARE MNGMENT TRAINING: CPT

## 2021-09-15 PROCEDURE — 92526 ORAL FUNCTION THERAPY: CPT

## 2021-09-15 PROCEDURE — 2580000003 HC RX 258: Performed by: INTERNAL MEDICINE

## 2021-09-15 PROCEDURE — 85025 COMPLETE CBC W/AUTO DIFF WBC: CPT

## 2021-09-15 PROCEDURE — 99232 SBSQ HOSP IP/OBS MODERATE 35: CPT | Performed by: INTERNAL MEDICINE

## 2021-09-15 PROCEDURE — 6360000002 HC RX W HCPCS: Performed by: INTERNAL MEDICINE

## 2021-09-15 PROCEDURE — 2580000003 HC RX 258: Performed by: PHYSICIAN ASSISTANT

## 2021-09-15 PROCEDURE — 83735 ASSAY OF MAGNESIUM: CPT

## 2021-09-15 PROCEDURE — 6370000000 HC RX 637 (ALT 250 FOR IP): Performed by: FAMILY MEDICINE

## 2021-09-15 PROCEDURE — 97530 THERAPEUTIC ACTIVITIES: CPT

## 2021-09-15 PROCEDURE — 36415 COLL VENOUS BLD VENIPUNCTURE: CPT

## 2021-09-15 PROCEDURE — 6370000000 HC RX 637 (ALT 250 FOR IP): Performed by: STUDENT IN AN ORGANIZED HEALTH CARE EDUCATION/TRAINING PROGRAM

## 2021-09-15 PROCEDURE — 80048 BASIC METABOLIC PNL TOTAL CA: CPT

## 2021-09-15 PROCEDURE — 6360000002 HC RX W HCPCS: Performed by: PHYSICIAN ASSISTANT

## 2021-09-15 RX ORDER — MAGNESIUM SULFATE IN WATER 40 MG/ML
2000 INJECTION, SOLUTION INTRAVENOUS PRN
Status: DISCONTINUED | OUTPATIENT
Start: 2021-09-15 | End: 2021-09-20 | Stop reason: HOSPADM

## 2021-09-15 RX ORDER — POTASSIUM CHLORIDE 7.45 MG/ML
10 INJECTION INTRAVENOUS PRN
Status: DISCONTINUED | OUTPATIENT
Start: 2021-09-15 | End: 2021-09-20 | Stop reason: HOSPADM

## 2021-09-15 RX ORDER — POTASSIUM CHLORIDE 7.45 MG/ML
10 INJECTION INTRAVENOUS
Status: DISPENSED | OUTPATIENT
Start: 2021-09-15 | End: 2021-09-15

## 2021-09-15 RX ORDER — POTASSIUM CHLORIDE 20 MEQ/1
40 TABLET, EXTENDED RELEASE ORAL PRN
Status: DISCONTINUED | OUTPATIENT
Start: 2021-09-15 | End: 2021-09-20 | Stop reason: HOSPADM

## 2021-09-15 RX ADMIN — POTASSIUM CHLORIDE 10 MEQ: 7.46 INJECTION, SOLUTION INTRAVENOUS at 13:15

## 2021-09-15 RX ADMIN — ACYCLOVIR SODIUM 650 MG: 50 INJECTION, SOLUTION INTRAVENOUS at 18:18

## 2021-09-15 RX ADMIN — POTASSIUM CHLORIDE 10 MEQ: 7.46 INJECTION, SOLUTION INTRAVENOUS at 14:53

## 2021-09-15 RX ADMIN — Medication 200 MG: at 11:07

## 2021-09-15 RX ADMIN — SENNOSIDES 8.6 MG: 8.6 TABLET, COATED ORAL at 21:17

## 2021-09-15 RX ADMIN — PREDNISOLONE ACETATE 1 DROP: 10 SUSPENSION/ DROPS OPHTHALMIC at 12:09

## 2021-09-15 RX ADMIN — LEVETIRACETAM 1000 MG: 100 INJECTION, SOLUTION INTRAVENOUS at 23:54

## 2021-09-15 RX ADMIN — PREDNISOLONE ACETATE 1 DROP: 10 SUSPENSION/ DROPS OPHTHALMIC at 06:37

## 2021-09-15 RX ADMIN — CALCIUM 500 MG: 500 TABLET ORAL at 10:56

## 2021-09-15 RX ADMIN — ENOXAPARIN SODIUM 40 MG: 40 INJECTION SUBCUTANEOUS at 21:17

## 2021-09-15 RX ADMIN — TAMSULOSIN HYDROCHLORIDE 0.4 MG: 0.4 CAPSULE ORAL at 11:09

## 2021-09-15 RX ADMIN — LISINOPRIL 10 MG: 10 TABLET ORAL at 11:04

## 2021-09-15 RX ADMIN — PREDNISOLONE ACETATE 1 DROP: 10 SUSPENSION/ DROPS OPHTHALMIC at 18:18

## 2021-09-15 RX ADMIN — POTASSIUM CHLORIDE 10 MEQ: 7.46 INJECTION, SOLUTION INTRAVENOUS at 12:04

## 2021-09-15 RX ADMIN — SODIUM CHLORIDE, PRESERVATIVE FREE 10 ML: 5 INJECTION INTRAVENOUS at 11:01

## 2021-09-15 RX ADMIN — LEVETIRACETAM 1000 MG: 100 INJECTION, SOLUTION INTRAVENOUS at 11:10

## 2021-09-15 RX ADMIN — SODIUM CHLORIDE, PRESERVATIVE FREE 10 ML: 5 INJECTION INTRAVENOUS at 11:02

## 2021-09-15 RX ADMIN — CALCIUM 500 MG: 500 TABLET ORAL at 21:17

## 2021-09-15 RX ADMIN — ACYCLOVIR SODIUM 650 MG: 50 INJECTION, SOLUTION INTRAVENOUS at 10:58

## 2021-09-15 RX ADMIN — POLYETHYLENE GLYCOL (3350) 17 G: 17 POWDER, FOR SOLUTION ORAL at 11:03

## 2021-09-15 RX ADMIN — Medication 1000 UNITS: at 16:39

## 2021-09-15 RX ADMIN — DOCUSATE SODIUM 100 MG: 50 LIQUID ORAL at 11:00

## 2021-09-15 RX ADMIN — Medication 50 MG: at 11:06

## 2021-09-15 ASSESSMENT — PAIN SCALES - GENERAL
PAINLEVEL_OUTOF10: 0

## 2021-09-15 NOTE — PROGRESS NOTES
Pt resting comfortably in bed. Pt denies any needs or pain at this time. Pt has call light, bedside table and possessions in reach. Bed alarm in on. SBARR given to Alejandra Rodriguez RN.  Piedad Valentino / [facility]

## 2021-09-15 NOTE — PROGRESS NOTES
Kishanmirthaluz Dee 61  INPATIENT OCCUPATIONAL THERAPY  [facility] NEUROSCIENCES 4A  EVALUATION    Time:   Time In: 7657  Time Out: 1149  Timed Code Treatment Minutes: 48 Minutes  Minutes: 64          Date: 9/15/2021  Patient Name: Denice Born,   Gender: male      MRN: [id number]  : 1940  ([de-identified] y.o.)  Referring Practitioner: Lida Prado DO  Diagnosis: Herpes Zoster Opthalmicus  Additional Pertinent Hx: Presented to [facility] on 2021 with complaints of right eye swelling and pain. Noted to have some photophobia and a small rash. Started on IV acyclovir with concern for herpes zoster ophthalmicus. A rapid response called on  after patient became hypotensive with altered mental status. CT head obtained showed no acute changes. On , ASHLEY PERALTA was called with patient being subsequently intubated and transferred to the ICU. LP performed on  positive for varicella-zoster. Patient had emergent hemodialysis treatment on  out of concerns for acyclovir-induced neurotoxicity and DEANDRE. Patient also developed myoclonic jerking of his upper extremities and was started on Keppra for seizure activity. Extubated on . Restrictions/Precautions:  Restrictions/Precautions: Fall Risk  Position Activity Restriction  Other position/activity restrictions: No pregnant therapist, impulsive and agitated 9/15    Subjective  Chart Reviewed: Yes, Orders, Progress Notes, History and Physical  Patient assessed for rehabilitation services?: Yes  Family / Caregiver Present: Yes    Subjective: Pt is confused and agitated at times during session, but generally willing to work with therapist. Family members present helping with encouragement and re-direction of patient. Pt's daughter that is present is an OT. Pain:   did not rate    Vitals: Pt on continuous monitoring of vitals in room--all [facility] throughout session.     Social/Functional History:  Lives With: Spouse  Type of Home: House  Home Layout: One level  Home Equipment: Rolling walker, Electric scooter   Bathroom Shower/Tub: Walk-in shower  Bathroom Toilet: Standard  Bathroom Equipment: Shower chair, Grab bars around toilet, Grab bars in shower       ADL Assistance: [address]: Independent  Homemaking Responsibilities: Yes  Ambulation Assistance: Independent  Transfer Assistance: Independent    Active : Yes     Additional Comments: Pt was indep PTA completing majority of IADLs d/t wife with medical concerns. Daughter reports that pt able to walk ~80 feet at a time with a walker, but also uses scooter within home. VISION:decreased vision d/t shingles in R eye    HEARING:  WFL    COGNITION: Slow Processing, Decreased Recall, Decreased Insight, Impaired Memory, Inattention, Decreased Problem Solving, Decreased Safety Awareness, Impaired Attention, Difficulty Following Commands, Impulsive and Tangential, highly confused at times requiring frequent redirection. Once daughter and wife stepped out of room, confusion increased. RANGE OF MOTION:  Bilateral Upper Extremity:  WFL    STRENGTH:  Bilateral Upper Extremity:  highly deconditioned    SENSATION:   WFL    ADL:   Lower Extremity Dressing: Dependent. socks  Toileting: Dependent. for cleanup after incontinence. UB dressing: [facility]    BALANCE:  Sitting Balance:  Minimal Assistance, Maximum Assistance. varied from maxA of 1 with CGA of another to Chas of 1-2 (Lean to R side--required redirection to sitting and upright posturing throughout. Able to sit EOB x ~10 min)  Standing Balance: Minimal Assistance, Maximum Assistance.  varied from maxA of 1 with Chas of another to Chas of 2 (stood approx 3 min with cues for upright standing posture)    BED MOBILITY:  Rolling to Left: Maximum Assistance, X 1    Rolling to Right: Maximum Assistance, X 1    Supine to Sit: Maximum Assistance, X 2    Sit to Supine: Maximum Assistance, X 2    Scooting: Dependent to scoot higher in bed with benefit from continued therapy after discharge    Patient Education:  OT Education: OT Role, Plan of Care, ADL Adaptive Strategies, Transfer Training, Family Education    Equipment Recommendations: Other: continue to assess pending discharge disposition    Plan:  Times per week: 5x  Current Treatment Recommendations: Strengthening, Balance Training, Functional Mobility Training, Endurance Training, Equipment Evaluation, Education, & procurement, Home Management Training, Self-Care / ADL, Patient/Caregiver Education & Training, Safety Education & Training, Neuromuscular Re-education. See long-term goal time frame for expected duration of plan of care. If no long-term goals established, a short length of stay is anticipated. Goals:     Short term goals  Time Frame for Short term goals: by discharge  Short term goal 1: Pt will tolerate OTR assessment of mobility once able to maintain standing balance at Chas of 1 to increase indep with acessing environment. Short term goal 2: Pt will complete sit<>stand t/fs with Chas of 1 to increase indep with toilet hygiene. Short term goal 3: Pt will complete LB ADL with Chas of 1 to increase indep with self care. Short term goal 4: Pt will tolerate dynamic standing task with 1-2 hand release and Chas of 1 to increase balance required for grooming. Following session, patient left in safe position with all fall risk precautions in place.

## 2021-09-15 NOTE — PROGRESS NOTES
[address] [address]  INPATIENT SPEECH THERAPY  [facility] NEUROSCIENCES 4A  DAILY NOTE    TIME   SLP Individual Minutes  Time In: 0437  Time Out: [address] [facility]  Minutes: 14  Timed Code Treatment Minutes: 0 Minutes       Date: 9/15/2021  Patient Name: Be Mazariegos      CSN: [id number]   : 1940  ([de-identified] y.o.)  Gender: male   Referring Physician:  Sumit Stephens DO  Diagnosis: herpes zoster ophthalmicus  Secondary Diagnosis: dysphagia and cognitive impairment  Precautions: Contact isolation, aspiration precautions, fall risk  Current Diet: Soft and Bite size w/ thin liquids; Downgraded to NPO except for ice chips and meds crushed in applesauce  Swallowing Strategies: single bites/sips, alternating bites/sips, fully chewing, slow intake, 1:1 meal assistance   Date of Last MBS/FEES: Not Applicable Recommended completing on 21    Pain:  No pain reported. Subjective: Upon ST arrival, Pt was awake and sitting up in bed. Per nursing report, Pt was more confused with decreased cognition this date. Pt was unable to follow the conversation and demonstrated poor topic maintenece. Pt was disoriented to personal medical situation, as he stated he needed to stand up to use the restroom when he has not been mobile and has a catheter present. Short-Term Goals:  SHORT TERM GOAL #1:  Goal 1: Patient will safely consume soft & bite size solids with thin liquid (1:1 assist) with no overt s/s of aspiration/penetration to demosntrate the ability to meet nutrition/hydrational needs. INTERVENTIONS:  Completed skilled dietary analysis with 1:1 PO trials to further assess and determine readiness for diet upgrade.     Prior to PO consumption, completed review and education on the following safe swallowing strategies/procedures:  *small bites  *single bites  *alternate bites and sips  *chew fully before swallowing  *eat slowly   *direct 1:1 meal assistance   **Pt required MOD A via verbal cues to alternate liquids/solids, while inconsistently requiring MAX A via verbal cues, tactile cues, remove utensils from hands to SLOW PO intake, fully masticate solids and swallow prior to next bite. *POOR demonstration of immediate recall or carry-over of safe swallowing strategies (aside from taking small bites) and rationale for using the strategies. *concerns for comprehension  due to limited cognitive awareness/functioning this tx session. Meal included:  -Thin liquids (sprite) x2 via can  -Soft solids (~15-20%) (soft & bite size meatloaf + gravy, cooked carrots)     Pt presented with poor oral containment demonstrated by limited labial seal evidenced by both solids and liquids and secretions escaping the oral cavity prior to swallowing. Pt demonstrated prolonged and incoordination of mastication of solids, while also requiring verbal prompting and physical restriction of utensils in order to slow down the intake of foods to promote increased mastication and readiness to swallow between bites. Pt also demonstrated obvious decreased bolus formation and AP transit, required prompting to alternate bites/sips in order to clear the oral cavity. Pt independently took small bites of food this session. Pt did not display any overt s/s of aspiration/penetration, however there was increased concerns pulmonary decline. PT exhibiting abnormal breath sounds & wet vocal quality. *Nurse Johan consulted to listen to breath sound, reporting \"rhonchi. \"     Recommended pt downgrade to NPO with recommendations to complete further instrumental swallow evaluation via MBS. Pt okay to consume ice chips after oral care and meds CRUSHED in puree until MBS is completed. SHORT TERM GOAL #2:  Goal 2: Patient will complete advanced PO trials of regular solids with timely oral phase of the swallow with no overt s/s of aspiration/penetration to demosntrate readiness for a diet upgrade.   INTERVENTIONS: Please refer to goal 1    SHORT TERM GOAL #3:  Goal 3:

## 2021-09-15 NOTE — FLOWSHEET NOTE
09/15/21 2909   Provider Notification   Reason for Communication Review case   Provider Name Dr. Rohith Guzman   Provider Notification Physician   Method of Communication Secure Message   Response Waiting for response   Notification Time 5781   message sent regarding K replacement and pt refusing oral replacement at this time.

## 2021-09-15 NOTE — PROGRESS NOTES
Patient is alert x1 needs directed to place, situation and time. Skin is warm, pink, and dry with no irritants or breakdown on body except face. Patient has scabbing on Right side of face with minimal discharge in right eye. Left eye is PERRLA. Right eye is swollen with discharge, not assessed for PERRLA. Capillary refill greater than 3. Strong and equal bilateral hand grasp and pull. Patient uses Nasal canula 6L with no irritation to ears or nares from tubing. Pulse ox 96% on right big toe. Asked patient to cough up excess secretions by deep breathing, suctioning, and coughing. Patient agreed to all,but suction. Resp. Rate regular but labored, with shallow breaths of 16/min. Upper and lower lungs bilaterally equal with diminished lung sounds on bottom lobes. All pulses were regular, +3. Abdomen is firm and non tender with hypoactive bound sounds in LRQ,URQ, and LLQ. Active bowel sounds were found in ULQ. Patient has not had a bowel movement at this time and states he is not passing flatulence.  Pietro Yanes SN/RSC

## 2021-09-15 NOTE — CARE COORDINATION
Discharge planning update:     following for discharge needs. Home with HH vs ECF.   Electronically signed by Latia Larose RN on 9/15/2021 at 2:35 PM

## 2021-09-15 NOTE — PROGRESS NOTES
Progress note: Infectious diseases    Patient - Zion Rome,  Age - [de-identified] y.o.    - 1940      Room Number - 4A-17/017-A   MRN -  [id number]   Acct # - [de-identified]  Date of Admission -  2021 11:26 AM    SUBJECTIVE:   He was trying to call his wife  Has cough  OBJECTIVE   VITALS    height is 5' 7\" (1.702 m) and weight is 213 lb 10 oz (96.9 kg). His oral temperature is 98.6 °F (37 °C). His blood pressure is 164/104 (abnormal) and his pulse is 82. His respiration is 18 and oxygen saturation is 96%. Wt Readings from Last 3 Encounters:   21 213 lb 10 oz (96.9 kg)   21 210 lb (95.3 kg)   19 207 lb 3.2 oz (94 kg)       I/O (24 Hours)    Intake/Output Summary (Last 24 hours) at 9/15/2021 1300  Last data filed at 9/15/2021 1102  Gross per 24 hour   Intake 2020.11 ml   Output 2050 ml   Net -29.89 ml       General Appearance:  Awake, alert, oriented, on oxygen  HEENT - normocephalic, atraumatic, pink conjunctiva,  anicteric sclera, VZV rash on right face in v1 distribution with scabbing of vesicles  Neck - Supple, no mass  Lungs -  Bilateral  air entry, (+) rhonchi,    Cardiovascular - Heart sounds are normal.  Regular rate and rhythm without murmur, gallop or rub. Abdomen - soft, not distended, nontender, bowel sounds present in all quadrants   Neurologic - No focal deficits  Skin - No bruising or bleeding.  Vesicles appearing on lower extremity skin,   Extremities - no rash  MEDICATIONS:      lisinopril  10 mg Oral Daily    acyclovir  10 mg/kg (Ideal) IntraVENous Q8H    polyethylene glycol  17 g Oral Daily    senna  1 tablet Oral Nightly    docusate  100 mg Oral Daily    levetiracetam  1,000 mg IntraVENous Q12H    prednisoLONE acetate  1 drop Right Eye 4 times per day    [Held by provider] QUEtiapine  25 mg Oral BID    [Held by provider] baclofen  20 mg Oral TID    calcium elemental  500 mg Oral BID    [Held by provider] magnesium oxide  400 mg Oral 4x Daily AC & HS    vitamin B-6  200 mg Oral Daily    tamsulosin  0.4 mg Oral Daily    Vitamin D  1,000 Units Oral BID AC    zinc sulfate  50 mg Oral Daily    sodium chloride flush  5-40 mL IntraVENous 2 times per day      sodium chloride 30 mL/hr at 09/15/21 1056    sodium chloride       magnesium sulfate, potassium chloride **OR** potassium alternative oral replacement **OR** potassium chloride, sodium chloride flush, sodium chloride, ondansetron **OR** ondansetron, acetaminophen **OR** acetaminophen, gabapentin, morphine      LABS:     CBC:   Recent Labs     09/13/21  0427 09/14/21  0749 09/15/21  0511   WBC 10.4 9.7 11.6*   HGB 13.2* 13.8* 12.5*    258 256     BMP:    Recent Labs     09/14/21  0749 09/14/21 2120 09/15/21  0511    140 140   K 3.3* 3.3* 3.3*    105 104   CO2 25 25 23   BUN 21 23* 23*   CREATININE 1.1 1.0 1.1   GLUCOSE 96 118* 121*     Calcium:  Recent Labs     09/15/21  0511   CALCIUM 9.3     Ionized Calcium:No results for input(s): IONCA in the last 72 hours. Magnesium:  Recent Labs     09/15/21  0511   MG 1.8     Phosphorus:  No results for input(s): PHOS in the last 72 hours. CULTURES:   UA: No results for input(s): SPECGRAV, PHUR, COLORU, CLARITYU, MUCUS, PROTEINU, BLOODU, RBCUA, WBCUA, BACTERIA, NITRU, GLUCOSEU, BILIRUBINUR, UROBILINOGEN, KETUA, LABCAST, LABCASTTY, AMORPHOS in the last 72 hours. 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    Encephalopathy G93.40    DEANDRE (acute kidney injury) ([facility] N17.9    ATN (acute tubular necrosis) ([facility]) N17.0    Hypokalemia E87.6         ASSESSMENT/PLAN     Herpes zoster ophthalmicus - right V1 distribution with complete crusting of vesicles. There appears to be vesicles on the skin indicating dissemination.  - Continue Acyclovir dose adjusted due to improvement of the renal function    Acute renal failure - Improving. Acute hypoxic respiratory failure - improving.  He needs incentive spirometry  Urethritis - gram stain gram positive cocci in pairs    Chase Avalos MD on 9/15/2021 at 1:00 PM

## 2021-09-15 NOTE — PROGRESS NOTES
Hospitalist Progress Note    Patient:  Sonny Lewis      Unit/Bed:4A-17/017-A    YOB: 1940    MRN: [id number]       Acct: [de-identified]     PCP: Aubree Narvaez    Date of Admission: 9/6/2021    Assessment/Plan:    1. 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 MRI brain reviewed no acute findings. Continue supportive measures. 2. Zoster ophthalmicus: Vesicular rash right V1 distribution, currently on IV acyclovir. Appreciate ID input. Continue with prednisone eyedrops per ophthalmology recommendations. 3. Seizures: EEG positive, currently on Keppra 1 g twice daily per neurology recommendations. 4. Group D Enterococcus UTI  5. Acute renal failure: Continue with IV fluid hydration, required short-term dialysis. Appreciate nephrology input, okay to remove [place] catheter. Monitor renal function closely and avoid nephrotoxic medications. 6. Acute hypoxemic respiratory failure secondary to infection as above: Patient did require mechanical ventilation, currently on 4 L nasal cannula. Encourage incentive spirometry if able due to cooperation. 7. Hypertension: Continue lisinopril 10 mg daily monitor blood pressure  8. Dysphagia: Currently n.p.o., speech therapy for swallow eval  9. DVT prophylaxis: [facility]        Hospital Course:   25-year-old male admitted with herpes zoster ophthalmicus, positive LP on 9 8. Patient developed respiratory failure and was intubated on 9 8. Subsequently extubated and transferred to floor A. Currently on IV acyclovir. Patient also required short-term dialysis. Subjective (past 24 hours):   Patient seen and examined at bedside, currently confused and paranoid. Still with cough that is productive. Weaning O2. No acute overnight events.       Medications:  Reviewed    Infusion Medications    sodium chloride 30 mL/hr at 09/15/21 1056    sodium chloride       Scheduled Medications    lisinopril  10 mg Oral Daily    acyclovir  10 mg/kg (Ideal) IntraVENous Q8H    polyethylene glycol  17 g Oral Daily    senna  1 tablet Oral Nightly    docusate  100 mg Oral Daily    levetiracetam  1,000 mg IntraVENous Q12H    prednisoLONE acetate  1 drop Right Eye 4 times per day    [Held by provider] QUEtiapine  25 mg Oral BID    [Held by provider] baclofen  20 mg Oral TID    calcium elemental  500 mg Oral BID    [Held by provider] magnesium oxide  400 mg Oral 4x Daily AC & HS    vitamin B-6  200 mg Oral Daily    tamsulosin  0.4 mg Oral Daily    Vitamin D  1,000 Units Oral BID AC    zinc sulfate  50 mg Oral Daily    sodium chloride flush  5-40 mL IntraVENous 2 times per day     PRN Meds: magnesium sulfate, potassium chloride **OR** potassium alternative oral replacement **OR** potassium chloride, sodium chloride flush, sodium chloride, ondansetron **OR** ondansetron, acetaminophen **OR** acetaminophen, gabapentin, morphine      Intake/Output Summary (Last 24 hours) at 9/15/2021 1750  Last data filed at 9/15/2021 1313  Gross per 24 hour   Intake 2915.73 ml   Output 1900 ml   Net 1015.73 ml       Diet:  Diet NPO Exceptions are: Ice Chips    Exam:  /84   Pulse 91   Temp 98.4 °F (36.9 °C) (Oral)   Resp 16   Ht 5' 7\" (1.702 m)   Wt 213 lb 10 oz (96.9 kg)   SpO2 96%   BMI 33.46 kg/m²     General appearance: No apparent distress, appears stated age and cooperative. Respiratory:  Normal respiratory effort. Clear to auscultation, bilaterally without Rales/Wheezes/Rhonchi. Cardiovascular: Regular rate and rhythm with normal S1/S2 without murmurs, rubs or gallops. Abdomen: Soft, non-tender, non-distended with normal bowel sounds.   Extremities: no pedal edema  Skin: Healing vesicular rash V1 distribution on right side    Labs:   Recent Labs     09/13/21  0427 09/14/21  0749 09/15/21  0511   WBC 10.4 9.7 11.6*   HGB 13.2* 13.8* 12.5*   HCT 40.9* 41.1* 39.6*    258 256     Recent Labs 09/14/21  0749 09/14/21  2120 09/15/21  0511    140 140   K 3.3* 3.3* 3.3*    105 104   CO2 25 25 23   BUN 21 23* 23*   CREATININE 1.1 1.0 1.1   CALCIUM 9.1 8.7 9.3       Microbiology:      Urinalysis:      Lab Results   Component Value Date    NITRU NEGATIVE 09/08/2021    WBCUA > 200 09/08/2021    BACTERIA FEW 09/08/2021    RBCUA 3-5 09/08/2021    BLOODU MODERATE 09/08/2021    SPECGRAV <1.005 09/08/2021       Radiology:  CT HEAD WO CONTRAST    Result Date: 9/7/2021  Exam: CT brain without contrast Comparison: None Clinical history: Acute mental status change Findings: The ventricles are normal in size and position. No intracranial masses or midline shift identified. No abnormal extra-axial fluid collections are seen. No acute intracranial hemorrhage. The visualized paranasal sinuses are clear. No skull fracture identified. Nonspecific tissue swelling at the right forehead and in the right malar region. Impression: 1. No acute intracranial hemorrhage or process identified. 2. Nonspecific tissue swelling at the right forehead and in the right malar region. This document has been electronically signed by: Peg Hassan MD on 09/07/2021 11:02 PM All CTs at this facility use dose modulation techniques and iterative reconstructions, and/or weight-based dosing when appropriate to reduce radiation to a low as reasonably achievable.       DVT prophylaxis: [x] Lovenox                                 [] SCDs                                 [] SQ Heparin                                 [] Encourage ambulation           [] Already on Anticoagulation     Code Status: Full Code    Tele:   [x] yes             [] no    Active Hospital Problems    Diagnosis Date Noted    DEANDRE (acute kidney injury) ([facility] [N17.9]     ATN (acute tubular necrosis) ([facility] [address] [N17.0]     Hypokalemia [E87.6]     Encephalopathy [G93.40]     Herpes zoster ophthalmicus [B02.30] 09/06/2021       Electronically signed by Yolanda Rowan MD on 9/15/2021 at 5:50 PM

## 2021-09-15 NOTE — PROGRESS NOTES
[facility]   Nephrology progress note  9/15/2021, 9:49 AM      Pt Name:    Annabella Zheng  MRN:     [id number]     YOB: 1940  Admit Date:    9/6/2021 11:26 AM  Primary Care Physician:  Marlen Faulkner   Room number  5I-65/071-D    Chief Complaint: Nephrology following for DEANDRE    Subjective:  Patient seen and examined  More awake and alert  No acute distress    Objective:  24HR INTAKE/OUTPUT:      Intake/Output Summary (Last 24 hours) at 9/15/2021 0949  Last data filed at 9/15/2021 0539  Gross per 24 hour   Intake 2000.11 ml   Output 2050 ml   Net -49.89 ml     I/O last 3 completed shifts: In: 2000.1 [P.O.:540; I.V.:1460.1]  Out: 2050 [Urine:2050]  No intake/output data recorded. Admission weight: 210 lb (95.3 kg)  Wt Readings from Last 3 Encounters:   09/11/21 213 lb 10 oz (96.9 kg)   09/05/21 210 lb (95.3 kg)   03/19/19 207 lb 3.2 oz (94 kg)     Body mass index is 33.46 kg/m². Physical examination  VITALS:     Vitals:    09/14/21 2359 09/15/21 0005 09/15/21 0345 09/15/21 0745   BP:   (!) 145/104 (!) 164/104   Pulse:   99 96   Resp:   16 16   Temp:   98 °F (36.7 °C) 98.4 °F (36.9 °C)   TempSrc:   Oral Axillary   SpO2: (!) 88% 93% 95% 95%   Weight:       Height:         General Appearance: Somewhat more awake, not fully oriented however  Neck: No JVD noted  GI: soft, non-tender, no guarding  Extremities: No significant pitting LE edema      Lab Data  CBC:   Recent Labs     09/13/21  0427 09/14/21  0749 09/15/21  0511   WBC 10.4 9.7 11.6*   HGB 13.2* 13.8* 12.5*   HCT 40.9* 41.1* 39.6*    258 256     BMP:  Recent Labs     09/14/21  0749 09/14/21  2120 09/15/21  0511    140 140   K 3.3* 3.3* 3.3*    105 104   CO2 25 25 23   BUN 21 23* 23*   CREATININE 1.1 1.0 1.1   GLUCOSE 96 118* 121*   CALCIUM 9.1 8.7 9.3   MG 1.8  --  1.8     Hepatic:   No results for input(s): LABALBU, AST, ALT, ALB, BILITOT, ALKPHOS in the last 72 hours.       Meds:  Infusion:    sodium chloride 50 mL/hr at 09/14/21 0700    sodium chloride       Meds:    lisinopril  10 mg Oral Daily    acyclovir  10 mg/kg (Ideal) IntraVENous Q8H    polyethylene glycol  17 g Oral Daily    senna  1 tablet Oral Nightly    docusate  100 mg Oral Daily    levetiracetam  1,000 mg IntraVENous Q12H    prednisoLONE acetate  1 drop Right Eye 4 times per day    [Held by provider] QUEtiapine  25 mg Oral BID    [Held by provider] baclofen  20 mg Oral TID    calcium elemental  500 mg Oral BID    [Held by provider] magnesium oxide  400 mg Oral 4x Daily AC & HS    vitamin B-6  200 mg Oral Daily    tamsulosin  0.4 mg Oral Daily    Vitamin D  1,000 Units Oral BID AC    zinc sulfate  50 mg Oral Daily    sodium chloride flush  5-40 mL IntraVENous 2 times per day     Meds prn: magnesium sulfate, potassium chloride **OR** potassium alternative oral replacement **OR** potassium chloride, sodium chloride flush, sodium chloride, ondansetron **OR** ondansetron, acetaminophen **OR** acetaminophen, gabapentin, morphine       Impression and Plan:  1. DEANDRE: Nonoliguric, likely ATN  Had emergent hemodialysis treatment last week out of concerns for acyclovir-induced neurotoxicity  Overall renal function stable at this time  okay to remove catheter  No further need for renal replacement therapy  Reduce IV fluids    2. Hypokalemia. Will replace with IV potassium--see orders  3. Hypernatremia. Resolved. Stable at this time  4. Altered mental status. 5.  Herpes zoster ophthalmicus  6. Mild metabolic acidosis: Resolved  7. Hypertension  8.   History of BPH      Crista Interiano MD  [facility]

## 2021-09-15 NOTE — PROGRESS NOTES
Pt. is resting in bed awake. He was repositioned to the Left side. Denied having to use the restroom. Call light and table are within reach. Patient states he has no pain present and does not need anything at this time.   Samreen OLSEN/RSC

## 2021-09-15 NOTE — FLOWSHEET NOTE
09/14/21 2058   Provider Notification   Reason for Communication Review case   Provider Name Dr. Adeel Lr   Provider Notification Physician   Method of Communication Secure Message   Response Waiting for response   Notification Time 2058   message sent at this time regarding pt indwelling catheter.

## 2021-09-16 ENCOUNTER — APPOINTMENT (OUTPATIENT)
Dept: GENERAL RADIOLOGY | Age: 81
DRG: 124 | End: 2021-09-16
Payer: MEDICARE

## 2021-09-16 LAB
ANION GAP SERPL CALCULATED.3IONS-SCNC: 13 MEQ/L (ref 8–16)
BASOPHILS # BLD: 0.8 %
BASOPHILS ABSOLUTE: 0.1 THOU/MM3 (ref 0–0.1)
BUN BLDV-MCNC: 23 MG/DL (ref 7–22)
CALCIUM SERPL-MCNC: 9.4 MG/DL (ref 8.5–10.5)
CHLORIDE BLD-SCNC: 107 MEQ/L (ref 98–111)
CO2: 25 MEQ/L (ref 23–33)
CREAT SERPL-MCNC: 0.9 MG/DL (ref 0.4–1.2)
EOSINOPHIL # BLD: 2 %
EOSINOPHILS ABSOLUTE: 0.2 THOU/MM3 (ref 0–0.4)
ERYTHROCYTE [DISTWIDTH] IN BLOOD BY AUTOMATED COUNT: 13.8 % (ref 11.5–14.5)
ERYTHROCYTE [DISTWIDTH] IN BLOOD BY AUTOMATED COUNT: 45.8 FL (ref 35–45)
GFR SERPL CREATININE-BSD FRML MDRD: 81 ML/MIN/1.73M2
GLUCOSE BLD-MCNC: 100 MG/DL (ref 70–108)
HCT VFR BLD CALC: 39 % (ref 42–52)
HEMOGLOBIN: 12.2 GM/DL (ref 14–18)
IMMATURE GRANS (ABS): 0.1 THOU/MM3 (ref 0–0.07)
IMMATURE GRANULOCYTES: 1.1 %
LYMPHOCYTES # BLD: 16.8 %
LYMPHOCYTES ABSOLUTE: 1.5 THOU/MM3 (ref 1–4.8)
MAGNESIUM: 2.1 MG/DL (ref 1.6–2.4)
MCH RBC QN AUTO: 28.6 PG (ref 26–33)
MCHC RBC AUTO-ENTMCNC: 31.3 GM/DL (ref 32.2–35.5)
MCV RBC AUTO: 91.3 FL (ref 80–94)
MONOCYTES # BLD: 9.9 %
MONOCYTES ABSOLUTE: 0.9 THOU/MM3 (ref 0.4–1.3)
NUCLEATED RED BLOOD CELLS: 0 /100 WBC
PLATELET # BLD: 260 THOU/MM3 (ref 130–400)
PMV BLD AUTO: 9 FL (ref 9.4–12.4)
POTASSIUM REFLEX MAGNESIUM: 3.1 MEQ/L (ref 3.5–5.2)
RBC # BLD: 4.27 MILL/MM3 (ref 4.7–6.1)
SEG NEUTROPHILS: 69.4 %
SEGMENTED NEUTROPHILS ABSOLUTE COUNT: 6.4 THOU/MM3 (ref 1.8–7.7)
SODIUM BLD-SCNC: 145 MEQ/L (ref 135–145)
WBC # BLD: 9.2 THOU/MM3 (ref 4.8–10.8)

## 2021-09-16 PROCEDURE — 6360000002 HC RX W HCPCS: Performed by: INTERNAL MEDICINE

## 2021-09-16 PROCEDURE — 6370000000 HC RX 637 (ALT 250 FOR IP): Performed by: STUDENT IN AN ORGANIZED HEALTH CARE EDUCATION/TRAINING PROGRAM

## 2021-09-16 PROCEDURE — 99232 SBSQ HOSP IP/OBS MODERATE 35: CPT | Performed by: HOSPITALIST

## 2021-09-16 PROCEDURE — 36415 COLL VENOUS BLD VENIPUNCTURE: CPT

## 2021-09-16 PROCEDURE — 92611 MOTION FLUOROSCOPY/SWALLOW: CPT

## 2021-09-16 PROCEDURE — 2060000000 HC ICU INTERMEDIATE R&B

## 2021-09-16 PROCEDURE — 6370000000 HC RX 637 (ALT 250 FOR IP): Performed by: HOSPITALIST

## 2021-09-16 PROCEDURE — 74230 X-RAY XM SWLNG FUNCJ C+: CPT

## 2021-09-16 PROCEDURE — 2580000003 HC RX 258: Performed by: PHYSICIAN ASSISTANT

## 2021-09-16 PROCEDURE — 85025 COMPLETE CBC W/AUTO DIFF WBC: CPT

## 2021-09-16 PROCEDURE — 6360000002 HC RX W HCPCS: Performed by: HOSPITALIST

## 2021-09-16 PROCEDURE — 83735 ASSAY OF MAGNESIUM: CPT

## 2021-09-16 PROCEDURE — 99232 SBSQ HOSP IP/OBS MODERATE 35: CPT | Performed by: INTERNAL MEDICINE

## 2021-09-16 PROCEDURE — 2580000003 HC RX 258: Performed by: INTERNAL MEDICINE

## 2021-09-16 PROCEDURE — 2500000003 HC RX 250 WO HCPCS: Performed by: HOSPITALIST

## 2021-09-16 PROCEDURE — 80048 BASIC METABOLIC PNL TOTAL CA: CPT

## 2021-09-16 PROCEDURE — 2580000003 HC RX 258: Performed by: FAMILY MEDICINE

## 2021-09-16 PROCEDURE — 6370000000 HC RX 637 (ALT 250 FOR IP): Performed by: FAMILY MEDICINE

## 2021-09-16 PROCEDURE — 6360000002 HC RX W HCPCS: Performed by: PHYSICIAN ASSISTANT

## 2021-09-16 RX ORDER — SODIUM CHLORIDE 450 MG/100ML
INJECTION, SOLUTION INTRAVENOUS CONTINUOUS
Status: DISCONTINUED | OUTPATIENT
Start: 2021-09-16 | End: 2021-09-19

## 2021-09-16 RX ADMIN — BARIUM SULFATE 20 ML: 400 PASTE ORAL at 09:38

## 2021-09-16 RX ADMIN — Medication 50 MG: at 09:47

## 2021-09-16 RX ADMIN — BARIUM SULFATE 20 ML: 400 SUSPENSION ORAL at 09:39

## 2021-09-16 RX ADMIN — POLYETHYLENE GLYCOL (3350) 17 G: 17 POWDER, FOR SOLUTION ORAL at 09:54

## 2021-09-16 RX ADMIN — Medication 1000 UNITS: at 06:23

## 2021-09-16 RX ADMIN — PREDNISOLONE ACETATE 1 DROP: 10 SUSPENSION/ DROPS OPHTHALMIC at 23:13

## 2021-09-16 RX ADMIN — ENOXAPARIN SODIUM 40 MG: 40 INJECTION SUBCUTANEOUS at 20:50

## 2021-09-16 RX ADMIN — ACYCLOVIR SODIUM 650 MG: 50 INJECTION, SOLUTION INTRAVENOUS at 23:16

## 2021-09-16 RX ADMIN — ACYCLOVIR SODIUM 650 MG: 50 INJECTION, SOLUTION INTRAVENOUS at 00:47

## 2021-09-16 RX ADMIN — LISINOPRIL 10 MG: 10 TABLET ORAL at 09:47

## 2021-09-16 RX ADMIN — CALCIUM 500 MG: 500 TABLET ORAL at 20:47

## 2021-09-16 RX ADMIN — SENNOSIDES 8.6 MG: 8.6 TABLET, COATED ORAL at 20:47

## 2021-09-16 RX ADMIN — LEVETIRACETAM 1000 MG: 100 INJECTION, SOLUTION INTRAVENOUS at 23:04

## 2021-09-16 RX ADMIN — LEVETIRACETAM 1000 MG: 100 INJECTION, SOLUTION INTRAVENOUS at 12:15

## 2021-09-16 RX ADMIN — POTASSIUM BICARBONATE 40 MEQ: 782 TABLET, EFFERVESCENT ORAL at 10:01

## 2021-09-16 RX ADMIN — PREDNISOLONE ACETATE 1 DROP: 10 SUSPENSION/ DROPS OPHTHALMIC at 18:01

## 2021-09-16 RX ADMIN — CALCIUM 500 MG: 500 TABLET ORAL at 09:47

## 2021-09-16 RX ADMIN — BARIUM SULFATE 80 ML: 0.81 POWDER, FOR SUSPENSION ORAL at 09:39

## 2021-09-16 RX ADMIN — ACYCLOVIR SODIUM 650 MG: 50 INJECTION, SOLUTION INTRAVENOUS at 09:48

## 2021-09-16 RX ADMIN — PREDNISOLONE ACETATE 1 DROP: 10 SUSPENSION/ DROPS OPHTHALMIC at 00:47

## 2021-09-16 RX ADMIN — SODIUM CHLORIDE: 9 INJECTION, SOLUTION INTRAVENOUS at 10:33

## 2021-09-16 RX ADMIN — PREDNISOLONE ACETATE 1 DROP: 10 SUSPENSION/ DROPS OPHTHALMIC at 06:23

## 2021-09-16 RX ADMIN — SODIUM CHLORIDE: 4.5 INJECTION, SOLUTION INTRAVENOUS at 15:38

## 2021-09-16 RX ADMIN — PREDNISOLONE ACETATE 1 DROP: 10 SUSPENSION/ DROPS OPHTHALMIC at 12:40

## 2021-09-16 RX ADMIN — SODIUM CHLORIDE, PRESERVATIVE FREE 10 ML: 5 INJECTION INTRAVENOUS at 09:48

## 2021-09-16 RX ADMIN — Medication 1000 UNITS: at 16:51

## 2021-09-16 RX ADMIN — ACYCLOVIR SODIUM 650 MG: 50 INJECTION, SOLUTION INTRAVENOUS at 15:42

## 2021-09-16 RX ADMIN — DOCUSATE SODIUM 100 MG: 50 LIQUID ORAL at 09:55

## 2021-09-16 RX ADMIN — Medication 200 MG: at 12:13

## 2021-09-16 RX ADMIN — SODIUM CHLORIDE, PRESERVATIVE FREE 10 ML: 5 INJECTION INTRAVENOUS at 23:13

## 2021-09-16 RX ADMIN — TAMSULOSIN HYDROCHLORIDE 0.4 MG: 0.4 CAPSULE ORAL at 09:45

## 2021-09-16 RX ADMIN — Medication 1000 UNITS: at 09:46

## 2021-09-16 ASSESSMENT — PAIN SCALES - GENERAL
PAINLEVEL_OUTOF10: 0

## 2021-09-16 NOTE — PROGRESS NOTES
1700  Removed lt subclavian temporary dialysis catheter at bedside. Pressure applied unitl hemostasis, site soft, dry, intact, covered with gauze and op-site. Patient tolerated well.

## 2021-09-16 NOTE — PROGRESS NOTES
Hospitalist Progress Note    Patient:  Gina Bryant      Unit/Bed:4A-17/017-A    YOB: 1940    MRN: [id number]       Acct: [de-identified]     PCP: Maude Ramos    Date of Admission: 9/6/2021    Assessment/Plan:    1. 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 MRI brain reviewed no acute findings. Continue supportive measures. 2. Zoster ophthalmicus: Vesicular rash right V1 distribution, currently on IV acyclovir. Appreciate ID input. Continue with prednisone eyedrops per ophthalmology recommendations. 3. Seizures: EEG positive, currently on Keppra 1 g twice daily per neurology recommendations. 4. Group D Enterococcus UTI  5. Acute renal failure: Resolved, IV fluids decreased. Monitor renal function avoid nephrotoxic medications. 6. Acute hypoxemic respiratory failure secondary to infection as above: Patient did require mechanical ventilation but has since been weaned off of oxygen completely. 7. Dysphagia: Dysphagia diet started. 8. Hypertension: Continue lisinopril 10 mg daily monitor blood pressure  9. Dysphagia: Currently n.p.o., speech therapy for swallow eval  10. DVT prophylaxis: Lovenox  11. Disposition: Discharge planning, likely will need ECF. Hospital Course:   78-year-old male admitted with herpes zoster ophthalmicus, positive LP on 9 8. Patient developed respiratory failure and was intubated on 9/8. Subsequently extubated and transferred to floor A. Currently on IV acyclovir. Patient also required short-term dialysis. Subjective (past 24 hours):   Patient seen and examined at bedside, still confused this morning when I evaluated patient. Had no acute overnight events. Patient weaned off of oxygen.       Medications:  Reviewed    Infusion Medications    sodium chloride 30 mL/hr at 09/16/21 1033    sodium chloride       Scheduled Medications    enoxaparin  40 mg SubCUTAneous Q24H    lisinopril  10 mg Oral Daily    acyclovir  10 mg/kg (Ideal) IntraVENous Q8H    polyethylene glycol  17 g Oral Daily    senna  1 tablet Oral Nightly    docusate  100 mg Oral Daily    levetiracetam  1,000 mg IntraVENous Q12H    prednisoLONE acetate  1 drop Right Eye 4 times per day    [Held by provider] QUEtiapine  25 mg Oral BID    [Held by provider] baclofen  20 mg Oral TID    calcium elemental  500 mg Oral BID    [Held by provider] magnesium oxide  400 mg Oral 4x Daily AC & HS    vitamin B-6  200 mg Oral Daily    tamsulosin  0.4 mg Oral Daily    Vitamin D  1,000 Units Oral BID AC    zinc sulfate  50 mg Oral Daily    sodium chloride flush  5-40 mL IntraVENous 2 times per day     PRN Meds: magnesium sulfate, potassium chloride **OR** potassium alternative oral replacement **OR** potassium chloride, sodium chloride flush, sodium chloride, ondansetron **OR** ondansetron, acetaminophen **OR** acetaminophen, gabapentin, morphine      Intake/Output Summary (Last 24 hours) at 9/16/2021 1040  Last data filed at 9/16/2021 0342  Gross per 24 hour   Intake 1355.62 ml   Output 2025 ml   Net -669.38 ml       Diet:  ADULT DIET; Dysphagia - Minced and Moist; No Drinking Straws    Exam:  BP (!) 158/106   Pulse 78   Temp 98.4 °F (36.9 °C) (Oral)   Resp 16   Ht 5' 7\" (1.702 m)   Wt 188 lb 4.8 oz (85.4 kg)   SpO2 94%   BMI 29.49 kg/m²     General appearance: No apparent distress, appears stated age and cooperative. Respiratory:  Normal respiratory effort. Clear to auscultation, bilaterally without Rales/Wheezes/Rhonchi. Cardiovascular: Regular rate and rhythm with normal S1/S2 without murmurs, rubs or gallops. Abdomen: Soft, non-tender, non-distended with normal bowel sounds.   Extremities: no pedal edema  Skin: Healing vesicular rash V1 distribution on right side    Labs:   Recent Labs     09/14/21  0749 09/15/21  0511 09/16/21  0431   WBC 9.7 11.6* 9.2   HGB 13.8* 12.5* 12.2*   HCT 41.1* 39.6* 39.0*    signed by Oliverio Price MD on 9/16/2021 at 10:40 AM

## 2021-09-16 NOTE — PROGRESS NOTES
[address]  Modified Barium Swallow    SLP Individual Minutes  Time In: 6472  Time Out: 1120  Minutes: 17  Timed Code Treatment Minutes: 0 Minutes       Date: 2021  Patient Name: Annabella Zheng      CSN: [id number]   : 1940  ([de-identified] y.o.)  Gender: male   Referring Physician: Chen Mora MD  Diagnosis: Herpes zoster ophthalmicus   Secondary Diagnosis: Dysphagia; cognitive impairment   Precautions: Contact isolation, aspiration precautions, fall risk   History of Present Illness/Injury: Patient admit to [facility] with above medical dx. Per physician H&P, patient is a [de-identified] y.o. male who presented to [address] [place] [address] with c/o right eye swelling, crusting, pain, copious drainage. He was seen in the ER on  with a fall with similar complaints. He was evaluated and at that time it was felt that he had bacterial conjunctivitis and facial injury. He had purulent dc and a corneal abrasion per documentation and he was sent home on gentamicin drops. He was to follow up with dr. Jareth velasquezho but returned to the ER, due to worsening pain and swelling and eye watering. In the ER, patient was felt to have herpes zoster opthalmicus and plan was made to admit to hospital for IV antiviral with optho consult in the morning. MRI of brain completed in  with results indicative of:   Impression       1. No acute findings. 2. Mild severity chronic small vessel ischemic changes. CSE completed with patient initially placed on soft/bite sized diet and thin liquids. Patient with worsening confusion with increasing dysphagia suspected. Recommendations for MBS to further assess and r/o pharyngeal dysfunction. has a past medical history of Hypertension. Current Diet: NPO     Pain: No pain reported. SUBJECTIVE:  Patient brought down to fluoroscopy suite via bed.  Patient with confused language, poor basic comprehension and ability to contribute to basic conversation. Adequate basic direction following of 1 step commands to complete skilled assessment. No family present. OBJECTIVE:    Respiratory Status:  Independent    Behavioral Observation:  Alert and Confused    PATIENT WAS EVALUATED USING:  BARIUM: thin liquids by spoon/cup/straw, mildly thick liquids by cup, puree, soft/mixed bolus, hard solid     ORAL PREPARATION PHASE:  Impaired:  Slow Mastication, Uncoordinated Mastication, Decreased Bolus Control, Decreased Bolus Formation and Loss of Bolus from Lips/Anterior Spillage    ORAL PHASE: Uncoordinated AP Movement, Slow AP Movement and Uncontrolled Bolus/Diffuse Fall Over Tongue Base     ORAL PHASE MARIAJOSE SCORE: (Dysphagia outcome and severity scale)  3 = Moderate Dysphagia - Total assist with strategies - Two or more consistencies restricted - Moderate oral residue clears with cue    PHARYNGEAL PHASE:  Impaired: Delayed Swallow, Decreased Airway Protection, Decreased Epiglottic Inversion and decreaed pharyngeal constriction      PHARYNGEAL PHASE MARIAJOSE SCORE: (Dysphagia outcome and severity scale)  4 = Mild-Moderate Dysphagia - One or two consistencies restricted - may exhibit one or more of the following: Residue clears with cue, Aspiration of one consistency with weak or no reflexive cough, Laryngeal penetration to the vocal cords with cough with two consistencies, Laryngeal penetration to the vocal cords without cough on one consistency    EVIDENCE FOR LARYNGEAL PENETRATION AND/OR ASPIRATION:  No evidence of aspiration  Laryngeal penetration evident with thin barium by cup/straw    PENETRATION-ASPIRATION SCALE (PAS):   Thin Liquids: 3 = Material enters the airway, remains above the vocal folds, and is not ejected from the airway  Mildly Thick Liquids:  1 = Material does not enter the airway  Puree:  1 = Material does not enter the airway  Soft Solid:  1 = Material does not enter the airway  Hard Solid: 1 = Material does not enter the airway    ESOPHAGEAL PHASE:   No significant findings    ATTEMPTED TECHNIQUES:  Small Bolus Size Effective    Straw Ineffective Resulting in increased depth of penetration   Cup Effective    Chin Tuck Not Attempted    Head Turn Not Attempted    Spoon Presentations Ineffective Resulting in max anterior spillage    Volitional Cough Not Attempted    Spontaneous Cough Not Attempted           DIAGNOSTIC IMPRESSIONS:  Patient presents with moderate oral and mild-moderate pharyngeal dysphagia evidenced by the above skilled level findings. Demonstration of slow/uncoordinated mastication and AP transit with patient attempts to converse while chewing with need for redirections to task. Decreased oral bolus control resulting in premature spillage to the lateral channels/pyriforms in conjunction to a delayed swallow. Patient with decreased bolus formation resulting in mild-moderate oral stasis which patient was able to clear with completion of double volitional swallows. Patient with ADEQUATE laryngeal elevation, anterior hyolaryngeal excursion, thyrohyoid approximation as well as TBR; however, overall execution significantly delayed/uncoordinated and appears to be DIRECTLY related to current altered mental status. Patient with stagnant epiglottis with absence of inversion resulting in poor airway protection with laryngeal penetration occurring DURING the swallow following consumption of thin barium by cup/straw. Depth of penetration midway to vocal folds with patient achieving nearly complete clearance upon completion of swallow with only trace stasis remaining on the underside of the epiglottis. No observed aspiration. Patient with no observed airway invasion following consumption of mildly thick liquids by cup. In addition to uncoordinated execution of above laryngeal musculature, patient with decreased pharyngeal constriction resulting in mild-moderate stasis within the valleculae and pyriforms.  Only partial clearance achieved with completion of double volitional swallows. Recommendations for advancement to minced/moist diet and thin liquids with restriction on straw, REQUIRED direct 1:1 assistance/supervision with meals, limiting distractions and close pulmonary monitoring. ST to follow up with continued dysphagia management/intervention. Diet Recommendations:  Minced/moist and thin liquids   Strategies:  Full Upright Position, Small Bite/Sip, No Straw, Multiple Swallow, Pulmonary Monitoring, Direct 1:1 Supervision, Alternate Solids and Liquids, Limit Distractions and Monitor for Fatigue   Rehabilitation Potential: excellent    EDUCATION:  Learner: Patient  Education:  Reviewed results and recommendations of this evaluation, Reviewed diet and strategies and Reviewed ST goals and Plan of Care  Evaluation of Education: Needs further instruction, No evidence of learning and Family not present    PLAN:  Skilled SLP intervention on acute care 3-5 x per week or until goals met and/or pt plateaus in function. Specific interventions for next session may include: dysphagia management/intervention, cognitive rehabilitation . PATIENT GOAL:    Did not state. Will further assess during treatment. SHORT TERM GOALS:  Short-term Goals  Timeframe for Short-term Goals: 2 weeks  Goal 1: Patient will safely consume a minced/moist diet and thin liquids (1:1 assist, limit distractions, no straws) without overt s/s of airway invasion and/or pulmonary declines to meet nutriational/hydration measures. Goal 2: Patient will complete advanced PO solids/meal trials with timely oral transit and absence of s/s of airway invasion for potential upgrade to LRD. Goal 3: Patient will complete effortful swallows with PO intake and/or following thermal/gustatory stimulation x10 good success to enhance timeliness of swallow initiation to minimize airway invasion events.   Goal 4: Patient will complete cognitive assessments medically appropriate      LONG TERM GOALS:  No established LTG's given short ELOS     JACQUIE Jiménez 9/16/2021

## 2021-09-16 NOTE — PROGRESS NOTES
Palliative care eval received to assist with goals of care and code status discussion. Pt admitted with herpes of his eye. Condition deteriorated significantly, pt required intubation and MIV, dialysis, LP was done which was + for VZ. Pt was successfully extubated on 9-13 and transferred out of the ICU on 9-14. Per chart review, pt has significant dysphagia. Current code status is Full code, pt does not have AD's. Per chart review, ACP discussion with pt was done in the ER when pt was alert and oriented, at this time he requested Full code status but not be left lingering on machines if prognosis is poor. Pt also declined completing LW and DPOA at that time, confirmed that his wife would make his decisions if he were unable to. Case discussed with Dr Sangeetha Ray, per her report, she is awaiting family to come in to discuss goals of care. Discussed with pt's nurse,LEVY Santiago. Contact info to her to notify writer when pt's family arrives. Writer will attempt to meet with family with Dr Sangeetha Ray.

## 2021-09-16 NOTE — PROGRESS NOTES
Palliative care to unit upon notification by RN that pt's daughter Loretta Bose, is here. Dr Sobia Will is also here, medical update given by Dr Sobia Will, including aspiration risks with dysphagia. Questions answered and Loretta Bose states understanding. Code status discussed, along with intubation for resp failure. We reviewed that pt had made his own decision in ER that he would want Full code status, we discussed if pt would still want that, given that significant changes to his condition. We discussed that the care team is hopeful pt will progress but it is unknown at this time if pt will recover back to his baseline. We discussed if pt would want limitations on only the \"heroic\" care at this time but continue with current active treatment. Viki states understanding and states that she will need to discuss this with her mom (pt's wife). Loretta Bose is agreeable to palliative care continuing to follow to assist with advance care planning as appropriate. Contact info to Loretta Bose if she has further needs. Emotional support and encouragement given. Palliative care will continue to follow.

## 2021-09-16 NOTE — PROGRESS NOTES
[facility] THERAPY MISSED TREATMENT NOTE  [facility]  4A-17/017-A      Date: 2021  Patient Name: Gilford Bastos        CSN: [id number]   : 1940  ([de-identified] y.o.)  Gender: male   Referring Practitioner: Aarti Carrasco DO  Diagnosis: Herpes Zoster Opthalmicus         REASON FOR MISSED TREATMENT: OT attempted X2 this date. At 910, pt out of room for MBSS. At 1430, pt refusing stating \"I just need you to get away from me\" while attempting to grab/push OTR away. Pt then hallucinating of seeing shoes on the floor, wife in hallway.  Will check back as able

## 2021-09-16 NOTE — PROGRESS NOTES
Progress note: Infectious diseases    Patient - Claribel Funez,  Age - [de-identified] y.o.    - 1940      Room Number - 9H-09/931-W   MRN -  [id number]   Acct # - [de-identified]  Date of Admission -  2021 11:26 AM    SUBJECTIVE:   Per nursing the patient has had intermittent confusion over the past 24 hours but has remained afebrile during that time period. Upon examination today he was having visual hallucination of seeing children in his room. However this event occurred after being awoken from sleep. He was easily reoriented and then able to answer other questions appropriately. He stated that he is feeling improved and has been up and walking with PT/OT. He denies chest pain, shortness of breath, abdominal pain, nausea, vomiting. He admits to cough. OBJECTIVE   VITALS    height is 5' 7\" (1.702 m) and weight is 188 lb 4.8 oz (85.4 kg). His oral temperature is 98.4 °F (36.9 °C). His blood pressure is 158/106 (abnormal) and his pulse is 78. His respiration is 16 and oxygen saturation is 94%. Wt Readings from Last 3 Encounters:   21 188 lb 4.8 oz (85.4 kg)   21 210 lb (95.3 kg)   19 207 lb 3.2 oz (94 kg)       I/O (24 Hours)    Intake/Output Summary (Last 24 hours) at 2021 0854  Last data filed at 2021 0342  Gross per 24 hour   Intake 1475.62 ml   Output 2025 ml   Net -549.38 ml       General Appearance:  Awake, alert, oriented, no acute distress  HEENT - normocephalic, atraumatic, pink conjunctiva,  anicteric sclera, VZV rash on right face in v1 distribution with scabbing of vesicles  Neck - Supple, no mass   Lungs -  Bilateral  air entry, (+) rhonchi,   Cardiovascular - Heart sounds are normal.  Regular rate and rhythm without murmur, gallop or rub. Abdomen - soft, not distended, nontender, bowel sounds present and normoactive in all quadrants   - white/yellow discharge from the urethra with Hampton catheter in place. Neurologic - No focal deficits  Skin - No bruising or bleeding. Vesicles appearing on lower extremity skin,   Extremities - no rash  MEDICATIONS:      enoxaparin  40 mg SubCUTAneous Q24H    lisinopril  10 mg Oral Daily    acyclovir  10 mg/kg (Ideal) IntraVENous Q8H    polyethylene glycol  17 g Oral Daily    senna  1 tablet Oral Nightly    docusate  100 mg Oral Daily    levetiracetam  1,000 mg IntraVENous Q12H    prednisoLONE acetate  1 drop Right Eye 4 times per day    [Held by provider] QUEtiapine  25 mg Oral BID    [Held by provider] baclofen  20 mg Oral TID    calcium elemental  500 mg Oral BID    [Held by provider] magnesium oxide  400 mg Oral 4x Daily AC & HS    vitamin B-6  200 mg Oral Daily    tamsulosin  0.4 mg Oral Daily    Vitamin D  1,000 Units Oral BID AC    zinc sulfate  50 mg Oral Daily    sodium chloride flush  5-40 mL IntraVENous 2 times per day      sodium chloride 30 mL/hr at 09/15/21 1056    sodium chloride       magnesium sulfate, potassium chloride **OR** potassium alternative oral replacement **OR** potassium chloride, sodium chloride flush, sodium chloride, ondansetron **OR** ondansetron, acetaminophen **OR** acetaminophen, gabapentin, morphine      LABS:     CBC:   Recent Labs     09/14/21  0749 09/15/21  0511 09/16/21  0431   WBC 9.7 11.6* 9.2   HGB 13.8* 12.5* 12.2*    256 260     BMP:    Recent Labs     09/14/21  2120 09/15/21  0511 09/16/21  0431    140 145   K 3.3* 3.3* 3.1*    104 107   CO2 25 23 25   BUN 23* 23* 23*   CREATININE 1.0 1.1 0.9   GLUCOSE 118* 121* 100     Calcium:  Recent Labs     09/16/21  0431   CALCIUM 9.4     Ionized Calcium:No results for input(s): IONCA in the last 72 hours. 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    Encephalopathy G93.40    DEANDRE (acute kidney injury) ([address] N17.9    ATN (acute tubular necrosis) ([facility]) N17.0    Hypokalemia E87.6         ASSESSMENT/PLAN     Herpes zoster ophthalmicus - right V1 distribution with complete crusting of vesicles. There appears to be vesicles on the skin indicating dissemination.  - Continue Acyclovir dose adjusted due to improvement of the renal function     Acute renal failure - Improving.  - Nephrology following     Acute hypoxic respiratory failure - Resolved. - Recommend incentive spirometry     Urethritis - gram stain gram positive cocci in pairs    Electronically signed by Geno Ferreira DO on 9/16/2021 at 8:59 AM   Patient was seen and examined face-to-face by me  The chart, progress notes, labs and radiographs were reviewed. Case discussed with the nurse. Questions and concerns were addressed. I agree with the progress note.

## 2021-09-16 NOTE — PROGRESS NOTES
This RN spoke with patient's daughter Agustin Ganja regarding patient's status and plan of care. All questions answered at this time. Agustin Tidwell encouraged to call unit with any other questions or concerns.

## 2021-09-16 NOTE — PROGRESS NOTES
Patient presents Reina Melara in the semi fowlers position in bed with TV on. Patient is having a conversation with self. Patient has right eye closed, right hand in a dressing, and IV on right wrist. Pt has a mares cath in and SCD on bilateral lower legs. The bed alarm is on with call light in reach. Pt does not voice concerns or pain. [facility].

## 2021-09-16 NOTE — PROGRESS NOTES
Pt had open lesions around right eye. Right eye went from 6mm-3mm and left eye went 4mm-2mm. Pt was oriented only to self and could follow commands. Lung sounds presented with slight ronchi in the upper lobes. Heart tones were present. Pt had negative arm drift, skin turgor of more than 5 seconds,and capillary refill less than 4 seconds. Abdominal sounds were present in all four quadrants. Penile region had seeping around the catheter and scrotum was swollen. Legs had SCD compression devices on bilateral lower legs. No edema present. Pedal pulses were weak and present in both legs. Pt had bed alarm on and two side rails up. RSCSN. .. Clotfreeman Scherer.

## 2021-09-16 NOTE — PROGRESS NOTES
Patient presents in semi fowlers with his shirt off and trying to get out of bed. He presents with lesions on right head. Alert and oriented to person only. Patient was left dressed, call light in reach, and bed alarm on.

## 2021-09-16 NOTE — PROGRESS NOTES
This RN spoke with patient's daughter Albina Haider regarding patient's status and plan of care. All questions answered at this time. Albina Haider encouraged to call unit with any other questions or concerns.

## 2021-09-16 NOTE — CARE COORDINATION
9/16/21, 3:03 PM EDT    DISCHARGE PLANNING EVALUATION    Called patients spouse to discuss discharge planning. She asked that SW call her daughter Loretta Bose. Spoke with Loretta Bose and she would like to be with her mother before she discussed discharge plan. She is agreeable to SW calling her tomorrow at 10:00am to discuss discharge planning.

## 2021-09-16 NOTE — PROGRESS NOTES
Patient alert only to self. Patient has lesions on right eye area, eyes went from 6mm-3mm on the right eye 4mm-2mm on the left eye. Pt lung sounds present with ronchi. ... RSCSN Jose Henderson.

## 2021-09-17 LAB
ANION GAP SERPL CALCULATED.3IONS-SCNC: 11 MEQ/L (ref 8–16)
BASOPHILS # BLD: 0.6 %
BASOPHILS ABSOLUTE: 0.1 THOU/MM3 (ref 0–0.1)
BUN BLDV-MCNC: 21 MG/DL (ref 7–22)
CALCIUM SERPL-MCNC: 9.1 MG/DL (ref 8.5–10.5)
CHLORIDE BLD-SCNC: 107 MEQ/L (ref 98–111)
CO2: 24 MEQ/L (ref 23–33)
CREAT SERPL-MCNC: 0.9 MG/DL (ref 0.4–1.2)
EOSINOPHIL # BLD: 2.5 %
EOSINOPHILS ABSOLUTE: 0.2 THOU/MM3 (ref 0–0.4)
ERYTHROCYTE [DISTWIDTH] IN BLOOD BY AUTOMATED COUNT: 13.8 % (ref 11.5–14.5)
ERYTHROCYTE [DISTWIDTH] IN BLOOD BY AUTOMATED COUNT: 46.5 FL (ref 35–45)
GFR SERPL CREATININE-BSD FRML MDRD: 81 ML/MIN/1.73M2
GLUCOSE BLD-MCNC: 107 MG/DL (ref 70–108)
HCT VFR BLD CALC: 38.6 % (ref 42–52)
HEMOGLOBIN: 12.1 GM/DL (ref 14–18)
IMMATURE GRANS (ABS): 0.08 THOU/MM3 (ref 0–0.07)
IMMATURE GRANULOCYTES: 0.8 %
LYMPHOCYTES # BLD: 16.1 %
LYMPHOCYTES ABSOLUTE: 1.6 THOU/MM3 (ref 1–4.8)
MAGNESIUM: 2.1 MG/DL (ref 1.6–2.4)
MCH RBC QN AUTO: 28.8 PG (ref 26–33)
MCHC RBC AUTO-ENTMCNC: 31.3 GM/DL (ref 32.2–35.5)
MCV RBC AUTO: 91.9 FL (ref 80–94)
MONOCYTES # BLD: 9.9 %
MONOCYTES ABSOLUTE: 1 THOU/MM3 (ref 0.4–1.3)
NUCLEATED RED BLOOD CELLS: 0 /100 WBC
PLATELET # BLD: 280 THOU/MM3 (ref 130–400)
PMV BLD AUTO: 8.9 FL (ref 9.4–12.4)
POTASSIUM REFLEX MAGNESIUM: 2.8 MEQ/L (ref 3.5–5.2)
RBC # BLD: 4.2 MILL/MM3 (ref 4.7–6.1)
SEG NEUTROPHILS: 70.1 %
SEGMENTED NEUTROPHILS ABSOLUTE COUNT: 6.9 THOU/MM3 (ref 1.8–7.7)
SODIUM BLD-SCNC: 142 MEQ/L (ref 135–145)
WBC # BLD: 9.9 THOU/MM3 (ref 4.8–10.8)

## 2021-09-17 PROCEDURE — 80048 BASIC METABOLIC PNL TOTAL CA: CPT

## 2021-09-17 PROCEDURE — 6370000000 HC RX 637 (ALT 250 FOR IP): Performed by: STUDENT IN AN ORGANIZED HEALTH CARE EDUCATION/TRAINING PROGRAM

## 2021-09-17 PROCEDURE — 99232 SBSQ HOSP IP/OBS MODERATE 35: CPT | Performed by: SOCIAL WORKER

## 2021-09-17 PROCEDURE — 36415 COLL VENOUS BLD VENIPUNCTURE: CPT

## 2021-09-17 PROCEDURE — 85025 COMPLETE CBC W/AUTO DIFF WBC: CPT

## 2021-09-17 PROCEDURE — 99232 SBSQ HOSP IP/OBS MODERATE 35: CPT | Performed by: HOSPITALIST

## 2021-09-17 PROCEDURE — 2060000000 HC ICU INTERMEDIATE R&B

## 2021-09-17 PROCEDURE — 2580000003 HC RX 258: Performed by: INTERNAL MEDICINE

## 2021-09-17 PROCEDURE — 6360000002 HC RX W HCPCS: Performed by: HOSPITALIST

## 2021-09-17 PROCEDURE — 6360000002 HC RX W HCPCS: Performed by: PHYSICIAN ASSISTANT

## 2021-09-17 PROCEDURE — 2580000003 HC RX 258: Performed by: FAMILY MEDICINE

## 2021-09-17 PROCEDURE — 2580000003 HC RX 258: Performed by: PHYSICIAN ASSISTANT

## 2021-09-17 PROCEDURE — 6370000000 HC RX 637 (ALT 250 FOR IP): Performed by: FAMILY MEDICINE

## 2021-09-17 PROCEDURE — 6360000002 HC RX W HCPCS: Performed by: INTERNAL MEDICINE

## 2021-09-17 PROCEDURE — 83735 ASSAY OF MAGNESIUM: CPT

## 2021-09-17 RX ORDER — POTASSIUM CHLORIDE 20 MEQ/1
40 TABLET, EXTENDED RELEASE ORAL 2 TIMES DAILY
Status: COMPLETED | OUTPATIENT
Start: 2021-09-17 | End: 2021-09-17

## 2021-09-17 RX ADMIN — Medication 1000 UNITS: at 16:19

## 2021-09-17 RX ADMIN — CALCIUM 500 MG: 500 TABLET ORAL at 08:53

## 2021-09-17 RX ADMIN — SODIUM CHLORIDE, PRESERVATIVE FREE 10 ML: 5 INJECTION INTRAVENOUS at 10:45

## 2021-09-17 RX ADMIN — CALCIUM 500 MG: 500 TABLET ORAL at 22:07

## 2021-09-17 RX ADMIN — Medication 200 MG: at 08:53

## 2021-09-17 RX ADMIN — Medication 50 MG: at 12:24

## 2021-09-17 RX ADMIN — ACYCLOVIR SODIUM 650 MG: 50 INJECTION, SOLUTION INTRAVENOUS at 23:53

## 2021-09-17 RX ADMIN — POLYETHYLENE GLYCOL (3350) 17 G: 17 POWDER, FOR SOLUTION ORAL at 12:24

## 2021-09-17 RX ADMIN — POTASSIUM CHLORIDE 40 MEQ: 1500 TABLET, EXTENDED RELEASE ORAL at 22:07

## 2021-09-17 RX ADMIN — TAMSULOSIN HYDROCHLORIDE 0.4 MG: 0.4 CAPSULE ORAL at 08:53

## 2021-09-17 RX ADMIN — ENOXAPARIN SODIUM 40 MG: 40 INJECTION SUBCUTANEOUS at 22:07

## 2021-09-17 RX ADMIN — SODIUM CHLORIDE, PRESERVATIVE FREE 10 ML: 5 INJECTION INTRAVENOUS at 22:06

## 2021-09-17 RX ADMIN — PREDNISOLONE ACETATE 1 DROP: 10 SUSPENSION/ DROPS OPHTHALMIC at 18:03

## 2021-09-17 RX ADMIN — LISINOPRIL 10 MG: 10 TABLET ORAL at 08:53

## 2021-09-17 RX ADMIN — PREDNISOLONE ACETATE 1 DROP: 10 SUSPENSION/ DROPS OPHTHALMIC at 12:24

## 2021-09-17 RX ADMIN — ACYCLOVIR SODIUM 650 MG: 50 INJECTION, SOLUTION INTRAVENOUS at 16:12

## 2021-09-17 RX ADMIN — ACYCLOVIR SODIUM 650 MG: 50 INJECTION, SOLUTION INTRAVENOUS at 10:45

## 2021-09-17 RX ADMIN — LEVETIRACETAM 1000 MG: 100 INJECTION, SOLUTION INTRAVENOUS at 12:23

## 2021-09-17 RX ADMIN — PREDNISOLONE ACETATE 1 DROP: 10 SUSPENSION/ DROPS OPHTHALMIC at 06:23

## 2021-09-17 RX ADMIN — PREDNISOLONE ACETATE 1 DROP: 10 SUSPENSION/ DROPS OPHTHALMIC at 23:52

## 2021-09-17 RX ADMIN — POTASSIUM CHLORIDE 40 MEQ: 1500 TABLET, EXTENDED RELEASE ORAL at 12:27

## 2021-09-17 RX ADMIN — Medication 1000 UNITS: at 08:53

## 2021-09-17 RX ADMIN — DIVALPROEX SODIUM 750 MG: 500 TABLET, DELAYED RELEASE ORAL at 22:07

## 2021-09-17 RX ADMIN — SENNOSIDES 8.6 MG: 8.6 TABLET, COATED ORAL at 22:07

## 2021-09-17 ASSESSMENT — PAIN SCALES - GENERAL
PAINLEVEL_OUTOF10: 0

## 2021-09-17 NOTE — PROGRESS NOTES
Palliative care to unit to check on pt. Dr Thompson Fails, dr Aixa Cary on unit, case discussed with them, as well as pt's nurse, LEVY Ferrer. Call placed to pt's daughter, Tip Crisostomo, in follow up from yesterday conversation and code status discussion. Tip Crisostomo states that she spoke with her mom and they request to continue with Full code status. Respect for request expressed, emotional support given. Tip Crisostomo states questions or needs. Palliative care will continue to follow as needed. Floor to notify PRN for acute needs.

## 2021-09-17 NOTE — PROGRESS NOTES
[address] [address] 61  OCCUPATIONAL THERAPY MISSED TREATMENT NOTE  [facility]      Date: 2021  Patient Name: Claribel Funez        CSN: [id number]   : 1940  ([de-identified] y.o.)  Gender: male   Referring Practitioner: Herminia Marina DO  Diagnosis: Herpes Zoster Opthalmicus         REASON FOR MISSED TREATMENT: Patient Unavailable first attempt patient lethargic today, not responsive or engaging, second attempt patient with physical therapy

## 2021-09-17 NOTE — PROGRESS NOTES
Hospitalist Progress Note    Patient:  Sonny Lewis      Unit/Bed:4A-17/017-A    YOB: 1940    MRN: [id number]       Acct: [de-identified]     PCP: Aubree Narvaez    Date of Admission: 9/6/2021    Assessment/Plan:    1. 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 MRI brain reviewed no acute findings. Continue supportive measures. 1. D/w neurology - will d/c Keppra as it may be causing agitation and paranoia. Advised to start patient on Depakote 750 mg BID instead which also has mood stabilizing properties. 2. Zoster ophthalmicus: Vesicular rash right V1 distribution, currently on IV acyclovir. Appreciate ID input. Continue with prednisone eyedrops per ophthalmology recommendations. 3. Seizures: EEG positive, Keppra switched to Depakote. 4. Group D Enterococcus UTI  5. Acute renal failure: Resolved, IV fluids decreased. Monitor renal function avoid nephrotoxic medications. 6. Acute hypoxemic respiratory failure secondary to infection as above: Resolved. Patient did require mechanical ventilation but has since been weaned off of oxygen completely. 7. Dysphagia: Speech eval, minced/moist and thin liquids  8. Hypertension: Continue lisinopril 10 mg daily monitor blood pressure  9. DVT prophylaxis: Lovenox  10. Disposition: Discharge planning, likely will need ECF. Hospital Course:   36-year-old male admitted with herpes zoster ophthalmicus, positive LP on 9 8. Patient developed respiratory failure and was intubated on 9/8. Subsequently extubated and transferred to floor A. Currently on IV acyclovir. Patient also required short-term dialysis. Subjective (past 24 hours):   Patient seen and examined at bedside. Still with confusion this morning. Had no acute overnight events. No complaints.     Medications:  Reviewed    Infusion Medications    sodium chloride 30 mL/hr at 09/16/21 1538    sodium chloride Scheduled Medications    potassium chloride  40 mEq Oral BID    divalproex  750 mg Oral 2 times per day    enoxaparin  40 mg SubCUTAneous Q24H    lisinopril  10 mg Oral Daily    acyclovir  10 mg/kg (Ideal) IntraVENous Q8H    polyethylene glycol  17 g Oral Daily    senna  1 tablet Oral Nightly    docusate  100 mg Oral Daily    prednisoLONE acetate  1 drop Right Eye 4 times per day    [Held by provider] QUEtiapine  25 mg Oral BID    [Held by provider] baclofen  20 mg Oral TID    calcium elemental  500 mg Oral BID    [Held by provider] magnesium oxide  400 mg Oral 4x Daily AC & HS    vitamin B-6  200 mg Oral Daily    tamsulosin  0.4 mg Oral Daily    Vitamin D  1,000 Units Oral BID AC    zinc sulfate  50 mg Oral Daily    sodium chloride flush  5-40 mL IntraVENous 2 times per day     PRN Meds: magnesium sulfate, potassium chloride **OR** potassium alternative oral replacement **OR** potassium chloride, sodium chloride flush, sodium chloride, ondansetron **OR** ondansetron, acetaminophen **OR** acetaminophen, gabapentin, morphine      Intake/Output Summary (Last 24 hours) at 9/17/2021 1819  Last data filed at 9/17/2021 0354  Gross per 24 hour   Intake 1233.59 ml   Output 1200 ml   Net 33.59 ml       Diet:  ADULT DIET; Dysphagia - Minced and Moist; No Drinking Straws  Adult Oral Nutrition Supplement; Standard 4 oz Oral Supplement  Adult Oral Nutrition Supplement; Frozen Oral Supplement  Adult Oral Nutrition Supplement; Other Oral Supplement; yogurt    Exam:  BP (!) 137/93   Pulse 88   Temp 97.5 °F (36.4 °C) (Oral)   Resp 17   Ht 5' 7\" (1.702 m)   Wt 188 lb 4.8 oz (85.4 kg)   SpO2 94%   BMI 29.49 kg/m²     General appearance: No apparent distress, appears stated age and cooperative. Respiratory:  Normal respiratory effort. Clear to auscultation, bilaterally without Rales/Wheezes/Rhonchi. Cardiovascular: Regular rate and rhythm with normal S1/S2 without murmurs, rubs or gallops.   Abdomen: Soft, non-tender, non-distended with normal bowel sounds. Extremities: no pedal edema  Skin: Healing vesicular rash V1 distribution on right side    Labs:   Recent Labs     09/15/21  0511 09/16/21  0431 09/17/21  0404   WBC 11.6* 9.2 9.9   HGB 12.5* 12.2* 12.1*   HCT 39.6* 39.0* 38.6*    260 280     Recent Labs     09/15/21  0511 09/16/21  0431 09/17/21  0404    145 142   K 3.3* 3.1* 2.8*    107 107   CO2 23 25 24   BUN 23* 23* 21   CREATININE 1.1 0.9 0.9   CALCIUM 9.3 9.4 9.1       Microbiology:      Urinalysis:      Lab Results   Component Value Date    NITRU NEGATIVE 09/08/2021    WBCUA > 200 09/08/2021    BACTERIA FEW 09/08/2021    RBCUA 3-5 09/08/2021    BLOODU MODERATE 09/08/2021    SPECGRAV <1.005 09/08/2021       Radiology:  CT HEAD WO CONTRAST    Result Date: 9/7/2021  Exam: CT brain without contrast Comparison: None Clinical history: Acute mental status change Findings: The ventricles are normal in size and position. No intracranial masses or midline shift identified. No abnormal extra-axial fluid collections are seen. No acute intracranial hemorrhage. The visualized paranasal sinuses are clear. No skull fracture identified. Nonspecific tissue swelling at the right forehead and in the right malar region. Impression: 1. No acute intracranial hemorrhage or process identified. 2. Nonspecific tissue swelling at the right forehead and in the right malar region. This document has been electronically signed by: Sarina Ibarra MD on 09/07/2021 11:02 PM All CTs at this facility use dose modulation techniques and iterative reconstructions, and/or weight-based dosing when appropriate to reduce radiation to a low as reasonably achievable.       DVT prophylaxis: [x] Lovenox                                 [] SCDs                                 [] SQ Heparin                                 [] Encourage ambulation           [] Already on Anticoagulation     Code Status: Full Code    Tele:   [x] yes [] no    Active Hospital Problems    Diagnosis Date Noted    [facility]RE [facility]) [facility] [N17.9]     [facility] [N17.0]     Hypokalemia [E87.6]     Encephalopathy [G93.40]     Herpes zoster ophthalmicus [B02.30] 09/06/2021       Electronically signed by Renae Meyer MD on 9/17/2021 at 6:19 PM

## 2021-09-17 NOTE — PROGRESS NOTES
Neurology Progress Note    Date:9/17/2021       DM:[id number]-88/[id number]  Patient Name:Miah Blank     YOB: 1940     Age:81 y.o.    CC:   Chief Complaint   Patient presents with    Eye Pain        Subjective      Balta Walters is an [de-identified] M with PMH of HTN who presented to [facility] with painful lesions, facial swelling since and R eye drainage and photophobia which began to develop on 9/4/21. History of shingles unknown, no hx of shingles immunization. No additional history available due to the condition of the patient. 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 His acyclovir is currently being held due to concerns regarding his kidney function. Overnight, his nurse noted some myoclonic jerking in his upper extremities which went on to affect his lower extremities. He is not currently on any antiepileptic medications but was on propofol and Versed at the time. Interval history 9/10/2021:  Acyclovir held yesterday d/t DEANDRE. Plan to resume today per nephro and ID. Dialysis cath placed yesterday. Underwent dialysis last night. No witnessed seizure activity since starting the keppra. Interval History 9/11/2021:  Patient on acyclovir. No new seizure activity with loading dose of keprra 1000mg and malignance dose increase of 1000mg BID. He is off sedation, withdrawing to pain, not following commands at this time. Interval History 9/12/2021:  No acute event overnight. Patient remains of acyclovir. Per RN last evening they took him off sedation and he was able to squeeze hands, and raise his eyebrows. He was started back on sedation overnight because he was having increased work of breathing. No seizure activity noted overnight.  Sedation was stopped today in rounds and patient is opening eyes, and following commands such as squeezing hands    Interval History 9/13/2021:  No acute changes overnight. Patient was extubated this morning at 0815. He is doing well with extubation. He is following commands and is trying to talk, but it very raspy after being extubated    Interval History 9/17/21:  Neuro returning as for question re management of antiepileptic drugs. Pt oriented x1 but now aggressive with staff, agitated. Review of Systems   Review of Systems   Unable to perform ROS: Acuity of condition   Pt confused unable to state symptoms. Medications   Scheduled Meds:    potassium chloride  40 mEq Oral BID    divalproex  750 mg Oral 2 times per day    enoxaparin  40 mg SubCUTAneous Q24H    lisinopril  10 mg Oral Daily    acyclovir  10 mg/kg (Ideal) IntraVENous Q8H    polyethylene glycol  17 g Oral Daily    senna  1 tablet Oral Nightly    docusate  100 mg Oral Daily    prednisoLONE acetate  1 drop Right Eye 4 times per day    [Held by provider] QUEtiapine  25 mg Oral BID    [Held by provider] baclofen  20 mg Oral TID    calcium elemental  500 mg Oral BID    [Held by provider] magnesium oxide  400 mg Oral 4x Daily AC & HS    vitamin B-6  200 mg Oral Daily    tamsulosin  0.4 mg Oral Daily    Vitamin D  1,000 Units Oral BID AC    zinc sulfate  50 mg Oral Daily    sodium chloride flush  5-40 mL IntraVENous 2 times per day     Continuous Infusions:    sodium chloride 30 mL/hr at 09/16/21 1538    sodium chloride       PRN Meds: magnesium sulfate, potassium chloride **OR** potassium alternative oral replacement **OR** potassium chloride, sodium chloride flush, sodium chloride, ondansetron **OR** ondansetron, acetaminophen **OR** acetaminophen, gabapentin, morphine    Past History    Past Medical History:   has a past medical history of Hypertension. Social History:   reports that he quit smoking about 51 years ago. His smoking use included cigarettes. He has a 10.00 pack-year smoking history.  He has never used smokeless tobacco. He reports that he does not drink alcohol and does not use drugs. Family History:   Family History   Problem Relation Age of Onset    High Blood Pressure Mother     No Known Problems Father        Physical Examination      Vitals:  BP (!) 137/93   Pulse 88   Temp 97.5 °F (36.4 °C) (Oral)   Resp 17   Ht 5' 7\" (1.702 m)   Wt 188 lb 4.8 oz (85.4 kg)   SpO2 94%   BMI 29.49 kg/m²   Temp (24hrs), Av.9 °F (36.6 °C), Min:97.5 °F (36.4 °C), Max:98.3 °F (36.8 °C)      I/O (24Hr): Intake/Output Summary (Last 24 hours) at 2021 192  Last data filed at 2021 0354  Gross per 24 hour   Intake 1233.59 ml   Output 1200 ml   Net 33.59 ml       Physical Exam  Vitals reviewed. Constitutional:       Appearance: He is ill-appearing. HENT:      Head: Normocephalic and atraumatic. Right Ear: External ear normal.      Left Ear: External ear normal.      Mouth/Throat:      Mouth: Mucous membranes are moist.   Eyes:      General:         Right eye: Discharge present. Left eye: Discharge present. Extraocular Movements: EOM normal.      Pupils: Pupils are equal, round, and reactive to light. Comments: Conjunctiva injected bilaterally   Cardiovascular:      Rate and Rhythm: Normal rate. Pulses: Normal pulses. Pulmonary:      Effort: Pulmonary effort is normal. No respiratory distress. Abdominal:      General: Abdomen is flat. Palpations: Abdomen is soft. Musculoskeletal:         General: No deformity or signs of injury. Cervical back: No rigidity or tenderness. Skin:     General: Skin is warm and dry. Findings: Rash (crusted vesicular rash of V1 distribution, vesicles on  skin of LE ) present. Neurological:      Mental Status: He is alert. Coordination: Finger-Nose-Finger Test and Heel to Clent Bookbinder Test normal.      Deep Tendon Reflexes:      Reflex Scores:       Tricep reflexes are 2+ on the right side and 2+ on the left side. Bicep reflexes are 2+ on the right side and 2+ on the left side.        Patellar reflexes are 2+ on the right side and 2+ on the left side. Achilles reflexes are 2+ on the right side and 2+ on the left side. Psychiatric:         Speech: Speech normal.      Comments: Agitated, confused       Neurologic Exam     Mental Status   Oriented to person. Disoriented to place. Disoriented to date. Follows 1 step commands. Attention: decreased. Concentration: decreased. Speech: speech is normal   Level of consciousness: arousable by verbal stimuli  Able to name object. Normal comprehension. Cranial Nerves     CN III, IV, VI   Pupils are equal, round, and reactive to light. Extraocular motions are normal.     CN VII   Facial expression full, symmetric. CN VIII   CN VIII normal.     CN IX, X   CN IX normal.     CN XI   CN XI normal.     CN XII   CN XII normal.   Tongue deviation: none    Motor Exam   Muscle bulk: normal  Overall muscle tone: normal  BUE: 5/5  BLE: 5/5     Sensory Exam   Light touch normal.     Gait, Coordination, and Reflexes     Coordination   Finger to nose coordination: normal  Heel to shin coordination: normal    Reflexes   Right biceps: 2+  Left biceps: 2+  Right triceps: 2+  Left triceps: 2+  Right patellar: 2+  Left patellar: 2+  Right achilles: 2+  Left achilles: 2+  Gait deferred at this time due to patient condition         Labs/Imaging/Diagnostics   Labs:  CBC:  Recent Labs     09/15/21  0511 09/16/21  0431 09/17/21  0404   WBC 11.6* 9.2 9.9   RBC 4.33* 4.27* 4.20*   HGB 12.5* 12.2* 12.1*   HCT 39.6* 39.0* 38.6*   MCV 91.5 91.3 91.9    260 280     CHEMISTRIES:  Recent Labs     09/15/21  0511 09/16/21  0431 09/17/21  0404    145 142   K 3.3* 3.1* 2.8*    107 107   CO2 23 25 24   BUN 23* 23* 21   CREATININE 1.1 0.9 0.9   GLUCOSE 121* 100 107   MG 1.8 2.1 2.1     PT/INR:No results for input(s): PROTIME, INR in the last 72 hours. APTT:No results for input(s): APTT in the last 72 hours.   LIVER PROFILE:  No results for input(s): AST, ALT, BILIDIR, BILITOT, ALKPHOS in the last 72 hours. Imaging Last 24 Hours:  CT HEAD WO CONTRAST    Result Date: 9/7/2021  Exam: CT brain without contrast Comparison: None Clinical history: Acute mental status change Findings: The ventricles are normal in size and position. No intracranial masses or midline shift identified. No abnormal extra-axial fluid collections are seen. No acute intracranial hemorrhage. The visualized paranasal sinuses are clear. No skull fracture identified. Nonspecific tissue swelling at the right forehead and in the right malar region. Impression: 1. No acute intracranial hemorrhage or process identified. 2. Nonspecific tissue swelling at the right forehead and in the right malar region. This document has been electronically signed by: Ally Soriano MD on 09/07/2021 11:02 PM All CTs at this facility use dose modulation techniques and iterative reconstructions, and/or weight-based dosing when appropriate to reduce radiation to a low as reasonably achievable. US RENAL COMPLETE    Result Date: 9/8/2021  BILATERAL RENAL ULTRASOUND: CLINICAL INFORMATION: Renal failure COMPARISON: No comparison available. TECHNIQUE: Multiple sonographic images of both kidneys were obtained. Images of the urinary bladder were also obtained. FINDINGS: RIGHT KIDNEY - 13.0 x 6.6 x 6.0 cm Resistive Index - 0.63 Cortical Thickness - 1.2 cm LEFT KIDNEY - 11.5 x 5.1 x 5.4 cm Resistive Index - 0.62 Cortical Thickness - 1.2 cm URINARY BLADDER catheter  KIDNEYS:  Both kidneys are normal in size and contour. The resistive indices are normal.  MASS/CYST: No solid or cystic lesion of either kidney is seen. HYDRONEPHROSIS:  There is no hydronephrosis. CALCULI:  No calculi are seen. BLADDER:  Urinary bladder empty with Hampton catheter in place. Moderate amount of sludge in the gallbladder. .     Normal renal ultrasound **This report has been created using voice recognition software.   It may contain minor errors which are inherent in voice recognition technology. ** Final report electronically signed by Dr. Laurann Severe on 9/8/2021 4:08 PM    XR CHEST PORTABLE    Result Date: 9/8/2021  PROCEDURE: XR CHEST PORTABLE CLINICAL INFORMATION: 56-year-old male who underwent endotracheal and nasogastric tube placement. COMPARISON: Chest x-ray 09/08/2021. TECHNIQUE: 3 AP semiupright views of the chest were obtained. FINDINGS: There is a nasogastric tube coursing along the route of the esophagus and terminating in the stomach. There is a nasogastric tube approximately 6.5 cm from the stan. The cardiac silhouette and pulmonary vasculature are within normal limits. There is no significant pleural effusion or pneumothorax. Visualized portions of the upper abdomen are within normal limits. The osseous structures are intact. No acute fractures or suspicious osseous lesions. Interval placement of an endotracheal and nasogastric tube. Otherwise stable appearance of the chest when compared to the prior study. **This report has been created using voice recognition software. It may contain minor errors which are inherent in voice recognition technology. ** Final report electronically signed by Dr Ksenia Eckert on 9/8/2021 4:21 PM    XR CHEST PORTABLE    Result Date: 9/8/2021  PROCEDURE: XR CHEST PORTABLE CLINICAL INFORMATION: rhonci and obtunded. COMPARISON: No prior study. TECHNIQUE: AP upright view of the chest. FINDINGS: The heart size is at the upper limits of normal.The mediastinum is not widened. There are no pulmonary infiltrates or effusions. The pulmonary vascularity is normal. No suspicious osseous lesions are present. No acute cardiopulmonary process. **This report has been created using voice recognition software. It may contain minor errors which are inherent in voice recognition technology. ** Final report electronically signed by Dr. Reid Salazar on 9/8/2021 7:56 AM    MRI BRAIN WO CONTRAST    Result Date: 9/8/2021  PROCEDURE: MRI BRAIN WO CONTRAST CLINICAL I/NFORMATION HCV. Unresponsive. HSV on the right forehead. Assess for HSV encephalitis. COMPARISON: Head CT 9/7/2021. TECHNIQUE: Multiplanar and multiple spin echo MRI images were obtained of the brain without contrast. FINDINGS: The diffusion-weighted images are normal.  The brain volume is mildly reduced. There is a mild amount of signal hyperintensity on the FLAIR and T2-weighted sequences in the white matter of the brain. This is consistent with mild severity chronic small vessel ischemic changes. There is no abnormal signal in the temporal lobes. There is a small area of old infarct or volume loss in the periphery of the left cerebellum. There are no intra-or extra-axial collections. There is no hydrocephalus, midline shift or mass effect. There is mineralization in the medial aspects of the basal ganglia bilaterally. The major intracranial vascular flow voids are present. The midline craniocervical junction structures are normal.  The pituitary gland and brainstem are normal. There is some fullness of the nasal mucosa. There is some trace fluid in the left mastoid air cells. 1. No acute findings. 2. Mild severity chronic small vessel ischemic changes. **This report has been created using voice recognition software. It may contain minor errors which are inherent in voice recognition technology. ** Final report electronically signed by Dr. Beverly Rojas on 9/8/2021 11:48 AM  /  CXR 9/12/2021         Impression   1. Normal heart size. ET tube and NG tube in good position. Left subclavian dialysis catheter with tip at cavoatrial junction. 2. Mildly increased markings right perihilar region of both lung bases, consistent with mild atelectasis/pneumonia.  Overall appearance slightly improved from prior.               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 Stop Keppra  Start Depakote 750 mg BID  Trough Depakote level tomorrow AM (before AM Depakote). Neurology to follow      Patient reviewed with Dr. Benny Reyes and he is in agreement with the assessment and plan.     Electronically signed by Cornelius Gandhi PA-C on 9/17/21

## 2021-09-17 NOTE — PROGRESS NOTES
Progress note: Infectious diseases    Patient - Gilford Bastos,  Age - [de-identified] y.o.    - 1940      Room Number - 0S-12/966-I   MRN -  [id number]   Acct # - [de-identified]  Date of Admission -  2021 11:26 AM    SUBJECTIVE:   Per nursing the patient has been more confused, angry, mild aggression, and non-compliant with staff over night. This has been progressively worsening over the past 48 hours. The patient was confused during interview and only oriented to person and time. He was restless and shuffling through items on his meal tray \"looking for his checkbook\". He stated that he now lives in a [facility] that used to be a hospital. The patient did not appear to be in any visible distress. OBJECTIVE   VITALS    height is 5' 7\" (1.702 m) and weight is 188 lb 4.8 oz (85.4 kg). His oral temperature is 98.2 °F (36.8 °C). His blood pressure is 153/96 (abnormal) and his pulse is 81. His respiration is 20 and oxygen saturation is 92%. Wt Readings from Last 3 Encounters:   21 188 lb 4.8 oz (85.4 kg)   21 210 lb (95.3 kg)   19 207 lb 3.2 oz (94 kg)       I/O (24 Hours)    Intake/Output Summary (Last 24 hours) at 2021 0736  Last data filed at 2021 0354  Gross per 24 hour   Intake 1613.59 ml   Output 1950 ml   Net -336.41 ml       General Appearance:  Awake, alert, oriented to self and time only no acute distress  HEENT - normocephalic, atraumatic, pink conjunctiva,  anicteric sclera, VZV rash on right face in v1 distribution with scabbing of vesicles  Neck - Supple, no mass   Lungs -  Bilateral  air entry, (+) rhonchi, (-) wheeze  Cardiovascular - Heart sounds are normal.  Regular rate and rhythm without murmur, gallop or rub. Abdomen - soft, not distended, nontender, bowel sounds present and normoactive in all quadrants   - white/yellow discharge from the urethra with Hampton catheter in place. Neurologic - No focal deficits  Skin - No bruising or bleeding.  Vesicles appearing on lower extremity skin,   Extremities - no rash  MEDICATIONS:      enoxaparin  40 mg SubCUTAneous Q24H    lisinopril  10 mg Oral Daily    acyclovir  10 mg/kg (Ideal) IntraVENous Q8H    polyethylene glycol  17 g Oral Daily    senna  1 tablet Oral Nightly    docusate  100 mg Oral Daily    levetiracetam  1,000 mg IntraVENous Q12H    prednisoLONE acetate  1 drop Right Eye 4 times per day    [Held by provider] QUEtiapine  25 mg Oral BID    [Held by provider] baclofen  20 mg Oral TID    calcium elemental  500 mg Oral BID    [Held by provider] magnesium oxide  400 mg Oral 4x Daily AC & HS    vitamin B-6  200 mg Oral Daily    tamsulosin  0.4 mg Oral Daily    Vitamin D  1,000 Units Oral BID AC    zinc sulfate  50 mg Oral Daily    sodium chloride flush  5-40 mL IntraVENous 2 times per day      sodium chloride 30 mL/hr at 09/16/21 1538    sodium chloride       magnesium sulfate, potassium chloride **OR** potassium alternative oral replacement **OR** potassium chloride, sodium chloride flush, sodium chloride, ondansetron **OR** ondansetron, acetaminophen **OR** acetaminophen, gabapentin, morphine      LABS:     CBC:   Recent Labs     09/15/21  0511 09/16/21  0431 09/17/21  0404   WBC 11.6* 9.2 9.9   HGB 12.5* 12.2* 12.1*    260 280     BMP:    Recent Labs     09/15/21  0511 09/16/21  0431 09/17/21  0404    145 142   K 3.3* 3.1* 2.8*    107 107   CO2 23 25 24   BUN 23* 23* 21   CREATININE 1.1 0.9 0.9   GLUCOSE 121* 100 107     Calcium:  Recent Labs     09/17/21  0404   CALCIUM 9.1     Ionized Calcium:No results for input(s): IONCA in the last 72 hours. 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 Likely secondary to VZV infection however cannot rule out the possibility that Keppra administration is affecting these changes given his aggression, psychosis, and hallucinations as these are known side effects.  - Recommend discussing discontinuation of Keppra with Neurology. 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 There appears to be vesicles on the skin indicating dissemination.  - Continue Acyclovir dose adjusted due to improvement of the renal function     Moderate Hypokalemia - K=2.8. Potassium replacement protocol in place.  - Management per primary    Acute renal failure - Resolved. - Nephrology following     Acute hypoxic respiratory failure - Resolved. - Recommend incentive spirometry     Urethritis - gram stain demonstrated gram positive cocci singly and in pairs as awell as many small gram positive bacilli. Electronically signed by Cheo Benz DO on 9/17/2021 at 7:36 AM   Patient was seen and examined face-to-face by me  The chart, progress notes, labs   were reviewed. Questions and concerns were addressed. I agree with the progress note.

## 2021-09-17 NOTE — PROGRESS NOTES
[facility] THERAPY MISSED TREATMENT NOTE  [facility] NEUROSCIENCES 4A      Date: 2021  Patient Name: Cecilio Padilla        MRN: [id number]    : 1940  ([de-identified] y.o.)    REASON FOR MISSED TREATMENT: Patient not appropriate for PO consumption at the time of ST arrival. Per ST discussion with LEVY Hernadez, the pt has been presenting with lethargy this afternoon, persistent confusion, and was recently given IV kepra, which may be contributing to lethargy and poor appetite and willingness to eat/drink. Tj George MA., CCC-SLP

## 2021-09-17 NOTE — PROGRESS NOTES
[facility] [address]  PHYSICAL THERAPY MISSED TREATMENT NOTE  [facility] 4A    Date: 2021  Patient Name: Sarbjit Gaona        MRN: [id number]   : 1940  ([de-identified] y.o.)  Gender: male                REASON FOR MISSED TREATMENT:  Patient refused treatment. PT eval and treat received and attempted to see pt this afternoon. Pt reporting that he is tired and has a headache and is unable to participate with PT at this time. Pt refusing despite encouragement. Will try back as able.      Toro Coello PT, DPT

## 2021-09-17 NOTE — CARE COORDINATION
Discharge planning update:    Referral made to [facility]. SW following.   Electronically signed by Diane Inman RN on 9/17/2021 at 1:55 PM

## 2021-09-17 NOTE — PROGRESS NOTES
Comprehensive Nutrition Assessment    Type and Reason for Visit:  Reassess (po, suppls)    Nutrition Recommendations/Plan:   -Consider MVI. -Started ONS: Ensure Compact TID, Magic Cup BID, and Yogurt BID. -Further diet recommendations per SLP.  -If continued poor oral intake would recommend consider EN for nutrition support: consider Jevity 1.5 goal of 55 ml/hr. Nutrition Assessment:    Pt. declining from a nutritional standpoint AEB diet resumed 9/16/21 but refusing to eat. Remains at risk for further nutritional compromise r/t dysphagia, admit with herpes zoster ophthalmicus,  respiratory failure, s/p code blue 9/8, intubated 9/8 ( extubated 9/13), acute encephalopathy secondary to herpes virus, DEANDRE with HD-resolved, and underlying medical condition (hx HTN).  Nutrition recommendations/interventions as per above. Malnutrition Assessment:  Malnutrition Status:  Insufficient data  Patient confused, uncooperative  Context:  Acute Illness     Findings of the 6 clinical characteristics of malnutrition:  Energy Intake:  1 - 75% or less of estimated energy requirements for 7 or more days  Weight Loss:  Unable to assess     Body Fat Loss:  Unable to assess     Muscle Mass Loss:  Unable to assess    Fluid Accumulation:  Unable to assess     Strength:  Not Performed    Estimated Daily Nutrient Needs:  Energy (kcal):  3783-2787 kcals (20-25 kcals/kg/day); Weight Used for Energy Requirements:   (85 kg)     Protein (g):   gm (1.2-1.5); Weight Used for Protein Requirements:  Ideal (67 kgm)        Fluid (ml/day):  per MD; Method Used for Fluid Requirements:  Other (Comment)      Nutrition Related Findings:  Extubated 9/13/21. Diet resumed per SLP 9/16/21, oral intake, 0-50%. NPO 9/15 so ONS was cancelled out. Patient seen, very sleepy, didn't carry on conversation. Per RN pt refused to eat breakfast, she tried to assist and encourage but he declined, stated \"He can't eat, too full\".   9/14/21-6 stools recorded, previous loose stools. 9/17/21: Potassium 2.8. Rx: acyclovir, oscal, colace, keppra, glycolax, senokot, vitamin B-6, vitamin D, zincate. Wounds:  None       Current Nutrition Therapies:    ADULT DIET; Dysphagia - Minced and Moist; No Drinking Straws  Adult Oral Nutrition Supplement; Standard 4 oz Oral Supplement  Adult Oral Nutrition Supplement; Frozen Oral Supplement  Adult Oral Nutrition Supplement; Other Oral Supplement; yogurt    Anthropometric Measures:  · Height: 5' 7\" (170.2 cm)  · Current Body Weight: 188 lb 4.8 oz (85.4 kg) (9/16/21: +1 facial, non-pitting generalized edema, previous weight 9/11/21: 213#10oz (+1 generalized edema))   · Admission Body Weight: 205 lb 8 oz (93.2 kg) (9/8 +scleral edema)    · Usual Body Weight:  (per EMR: 9/5/21: 210# stated weight)     · Ideal Body Weight: 148 lbs;   · BMI: 29.5  · Adjusted Body Weight:  ; No Adjustment   · BMI Categories: Obese Class 1 (BMI 30.0-34. 9)       Nutrition Diagnosis:   · Inadequate oral intake related to cognitive or neurological impairment as evidenced by intake 26-50%, intake 0-25%      Nutrition Interventions:   Food and/or Nutrient Delivery:  Continue Current Diet, Start Oral Nutrition Supplement  Nutrition Education/Counseling:  Education not appropriate   Coordination of Nutrition Care:  Continue to monitor while inpatient    Goals:  Patient will tolerate and consume 75% or more of meals during LOS. Nutrition Monitoring and Evaluation:   Behavioral-Environmental Outcomes:  None Identified   Food/Nutrient Intake Outcomes:  Enteral Nutrition Intake/Tolerance  Physical Signs/Symptoms Outcomes:  Biochemical Data, Chewing or Swallowing, GI Status, Fluid Status or Edema, Hemodynamic Status, Nutrition Focused Physical Findings, Skin, Weight     Discharge Planning:     Too soon to determine     Electronically signed by Vivi Chacon RD, LD on 9/17/21 at 12:29 PM EDT    Contact: [phone]

## 2021-09-17 NOTE — CARE COORDINATION
9/17/21, 10:57 AM EDT    DISCHARGE PLANNING EVALUATION     Spoke with patients spouse Raj Huynh and daughter Jamel Al over the phone. They both are agreeable to patient going to an ECF at discharge. Their first choice is [facility]. Called and made a referral to [facility] at the facility. Faxed chart information. Did encourage daughter Jamel Al to come up with a back up plan. Update 2:48pm: Spoke with Jonathan at [facility]. They are able to accept on Monday. He will need a COVID test done. [facility] has started the precert (will not need to wait for results). Spoke with patients daughter Jamel Al and made her aware patient has been accepted to the facility and  they will not be able to take until Monday.

## 2021-09-18 LAB
ANION GAP SERPL CALCULATED.3IONS-SCNC: 9 MEQ/L (ref 8–16)
BASOPHILS # BLD: 0.9 %
BASOPHILS ABSOLUTE: 0.1 THOU/MM3 (ref 0–0.1)
BLOOD CULTURE, ROUTINE: NORMAL
BUN BLDV-MCNC: 23 MG/DL (ref 7–22)
CALCIUM SERPL-MCNC: 8.8 MG/DL (ref 8.5–10.5)
CHLORIDE BLD-SCNC: 108 MEQ/L (ref 98–111)
CO2: 27 MEQ/L (ref 23–33)
CREAT SERPL-MCNC: 0.9 MG/DL (ref 0.4–1.2)
EOSINOPHIL # BLD: 4.1 %
EOSINOPHILS ABSOLUTE: 0.4 THOU/MM3 (ref 0–0.4)
ERYTHROCYTE [DISTWIDTH] IN BLOOD BY AUTOMATED COUNT: 13.8 % (ref 11.5–14.5)
ERYTHROCYTE [DISTWIDTH] IN BLOOD BY AUTOMATED COUNT: 46.2 FL (ref 35–45)
GENITAL CULTURE, ROUTINE: ABNORMAL
GENITAL CULTURE, ROUTINE: ABNORMAL
GFR SERPL CREATININE-BSD FRML MDRD: 81 ML/MIN/1.73M2
GLUCOSE BLD-MCNC: 105 MG/DL (ref 70–108)
GRAM STAIN RESULT: ABNORMAL
HCT VFR BLD CALC: 35.8 % (ref 42–52)
HEMOGLOBIN: 11.3 GM/DL (ref 14–18)
IMMATURE GRANS (ABS): 0.12 THOU/MM3 (ref 0–0.07)
IMMATURE GRANULOCYTES: 1.4 %
LYMPHOCYTES # BLD: 24.9 %
LYMPHOCYTES ABSOLUTE: 2.1 THOU/MM3 (ref 1–4.8)
MAGNESIUM: 2.1 MG/DL (ref 1.6–2.4)
MCH RBC QN AUTO: 29.1 PG (ref 26–33)
MCHC RBC AUTO-ENTMCNC: 31.6 GM/DL (ref 32.2–35.5)
MCV RBC AUTO: 92.3 FL (ref 80–94)
MONOCYTES # BLD: 7.6 %
MONOCYTES ABSOLUTE: 0.7 THOU/MM3 (ref 0.4–1.3)
NUCLEATED RED BLOOD CELLS: 0 /100 WBC
ORGANISM: ABNORMAL
PLATELET # BLD: 287 THOU/MM3 (ref 130–400)
PMV BLD AUTO: 9 FL (ref 9.4–12.4)
POTASSIUM REFLEX MAGNESIUM: 3.2 MEQ/L (ref 3.5–5.2)
RBC # BLD: 3.88 MILL/MM3 (ref 4.7–6.1)
SEG NEUTROPHILS: 61.1 %
SEGMENTED NEUTROPHILS ABSOLUTE COUNT: 5.3 THOU/MM3 (ref 1.8–7.7)
SODIUM BLD-SCNC: 144 MEQ/L (ref 135–145)
VALPROIC ACID LEVEL: 46.1 UG/ML (ref 50–100)
WBC # BLD: 8.6 THOU/MM3 (ref 4.8–10.8)

## 2021-09-18 PROCEDURE — 92526 ORAL FUNCTION THERAPY: CPT

## 2021-09-18 PROCEDURE — 80048 BASIC METABOLIC PNL TOTAL CA: CPT

## 2021-09-18 PROCEDURE — 92523 SPEECH SOUND LANG COMPREHEN: CPT

## 2021-09-18 PROCEDURE — 6370000000 HC RX 637 (ALT 250 FOR IP): Performed by: HOSPITALIST

## 2021-09-18 PROCEDURE — 6370000000 HC RX 637 (ALT 250 FOR IP): Performed by: STUDENT IN AN ORGANIZED HEALTH CARE EDUCATION/TRAINING PROGRAM

## 2021-09-18 PROCEDURE — 6360000002 HC RX W HCPCS: Performed by: HOSPITALIST

## 2021-09-18 PROCEDURE — 6360000002 HC RX W HCPCS: Performed by: INTERNAL MEDICINE

## 2021-09-18 PROCEDURE — 2580000003 HC RX 258: Performed by: FAMILY MEDICINE

## 2021-09-18 PROCEDURE — 97530 THERAPEUTIC ACTIVITIES: CPT

## 2021-09-18 PROCEDURE — 99232 SBSQ HOSP IP/OBS MODERATE 35: CPT | Performed by: HOSPITALIST

## 2021-09-18 PROCEDURE — 36415 COLL VENOUS BLD VENIPUNCTURE: CPT

## 2021-09-18 PROCEDURE — 6370000000 HC RX 637 (ALT 250 FOR IP): Performed by: FAMILY MEDICINE

## 2021-09-18 PROCEDURE — 83735 ASSAY OF MAGNESIUM: CPT

## 2021-09-18 PROCEDURE — 2580000003 HC RX 258: Performed by: INTERNAL MEDICINE

## 2021-09-18 PROCEDURE — 2060000000 HC ICU INTERMEDIATE R&B

## 2021-09-18 PROCEDURE — 80164 ASSAY DIPROPYLACETIC ACD TOT: CPT

## 2021-09-18 PROCEDURE — 97162 PT EVAL MOD COMPLEX 30 MIN: CPT

## 2021-09-18 PROCEDURE — 85025 COMPLETE CBC W/AUTO DIFF WBC: CPT

## 2021-09-18 PROCEDURE — 97535 SELF CARE MNGMENT TRAINING: CPT

## 2021-09-18 PROCEDURE — 99232 SBSQ HOSP IP/OBS MODERATE 35: CPT | Performed by: SOCIAL WORKER

## 2021-09-18 RX ORDER — GUAIFENESIN/DEXTROMETHORPHAN 100-10MG/5
5 SYRUP ORAL EVERY 4 HOURS PRN
Status: DISCONTINUED | OUTPATIENT
Start: 2021-09-18 | End: 2021-09-20 | Stop reason: HOSPADM

## 2021-09-18 RX ADMIN — DIVALPROEX SODIUM 750 MG: 500 TABLET, DELAYED RELEASE ORAL at 09:50

## 2021-09-18 RX ADMIN — LISINOPRIL 10 MG: 10 TABLET ORAL at 09:50

## 2021-09-18 RX ADMIN — PREDNISOLONE ACETATE 1 DROP: 10 SUSPENSION/ DROPS OPHTHALMIC at 14:23

## 2021-09-18 RX ADMIN — ACYCLOVIR SODIUM 650 MG: 50 INJECTION, SOLUTION INTRAVENOUS at 16:59

## 2021-09-18 RX ADMIN — PREDNISOLONE ACETATE 1 DROP: 10 SUSPENSION/ DROPS OPHTHALMIC at 06:26

## 2021-09-18 RX ADMIN — POLYETHYLENE GLYCOL (3350) 17 G: 17 POWDER, FOR SOLUTION ORAL at 10:06

## 2021-09-18 RX ADMIN — SODIUM CHLORIDE, PRESERVATIVE FREE 10 ML: 5 INJECTION INTRAVENOUS at 09:43

## 2021-09-18 RX ADMIN — SENNOSIDES 8.6 MG: 8.6 TABLET, COATED ORAL at 20:15

## 2021-09-18 RX ADMIN — DIVALPROEX SODIUM 750 MG: 500 TABLET, DELAYED RELEASE ORAL at 20:15

## 2021-09-18 RX ADMIN — GABAPENTIN 300 MG: 300 CAPSULE ORAL at 09:50

## 2021-09-18 RX ADMIN — Medication 1000 UNITS: at 06:25

## 2021-09-18 RX ADMIN — CALCIUM 500 MG: 500 TABLET ORAL at 09:50

## 2021-09-18 RX ADMIN — SODIUM CHLORIDE, PRESERVATIVE FREE 10 ML: 5 INJECTION INTRAVENOUS at 17:00

## 2021-09-18 RX ADMIN — PREDNISOLONE ACETATE 1 DROP: 10 SUSPENSION/ DROPS OPHTHALMIC at 23:17

## 2021-09-18 RX ADMIN — Medication 200 MG: at 09:52

## 2021-09-18 RX ADMIN — TAMSULOSIN HYDROCHLORIDE 0.4 MG: 0.4 CAPSULE ORAL at 09:50

## 2021-09-18 RX ADMIN — PREDNISOLONE ACETATE 1 DROP: 10 SUSPENSION/ DROPS OPHTHALMIC at 19:12

## 2021-09-18 RX ADMIN — ACYCLOVIR SODIUM 650 MG: 50 INJECTION, SOLUTION INTRAVENOUS at 09:42

## 2021-09-18 RX ADMIN — POTASSIUM CHLORIDE 40 MEQ: 1500 TABLET, EXTENDED RELEASE ORAL at 06:26

## 2021-09-18 RX ADMIN — Medication 50 MG: at 09:50

## 2021-09-18 RX ADMIN — Medication 1000 UNITS: at 16:29

## 2021-09-18 RX ADMIN — CALCIUM 500 MG: 500 TABLET ORAL at 20:15

## 2021-09-18 RX ADMIN — ENOXAPARIN SODIUM 40 MG: 40 INJECTION SUBCUTANEOUS at 20:15

## 2021-09-18 ASSESSMENT — ENCOUNTER SYMPTOMS
CHOKING: 0
PHOTOPHOBIA: 0
NAUSEA: 0
VOMITING: 0
SHORTNESS OF BREATH: 0
ABDOMINAL DISTENTION: 0
TROUBLE SWALLOWING: 1
EYE PAIN: 0
EYE ITCHING: 1
COUGH: 0
EYE DISCHARGE: 1
DIARRHEA: 0
ABDOMINAL PAIN: 0

## 2021-09-18 ASSESSMENT — PAIN SCALES - GENERAL
PAINLEVEL_OUTOF10: 0

## 2021-09-18 NOTE — PROGRESS NOTES
Neurology Progress Note    Date:9/18/2021       QQSI:[id number]  Patient Name:Miah Blank     YOB: 1940     Age:81 y.o.    CC:   Chief Complaint   Patient presents with    Eye Pain        Subjective      Cheo Miller is an [de-identified] M with PMH of HTN who presented to [facility] with painful lesions, facial swelling since and R eye drainage and photophobia which began to develop on 9/4/21. History of shingles unknown, no hx of shingles immunization. No additional history available due to the condition of the patient. 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 His acyclovir is currently being held due to concerns regarding his kidney function. Overnight, his nurse noted some myoclonic jerking in his upper extremities which went on to affect his lower extremities. He is not currently on any antiepileptic medications but was on propofol and Versed at the time. Interval history 9/10/2021:  Acyclovir held yesterday d/t DEANDRE. Plan to resume today per nephro and ID. Dialysis cath placed yesterday. Underwent dialysis last night. No witnessed seizure activity since starting the keppra. Interval History 9/11/2021:  Patient on acyclovir. No new seizure activity with loading dose of keprra 1000mg and malignance dose increase of 1000mg BID. He is off sedation, withdrawing to pain, not following commands at this time. Interval History 9/12/2021:  No acute event overnight. Patient remains of acyclovir. Per RN last evening they took him off sedation and he was able to squeeze hands, and raise his eyebrows. He was started back on sedation overnight because he was having increased work of breathing. No seizure activity noted overnight.  Sedation was stopped today in rounds and patient is opening eyes, and following commands such as squeezing hands    Interval History 9/13/2021:  No acute changes overnight. Patient was extubated this morning at 0815. He is doing well with extubation. He is following commands and is trying to talk, but it very raspy after being extubated    Interval History 9/17/21:  Neuro returning as for question re management of antiepileptic drugs. Pt oriented x1 but now aggressive with staff, agitated. Interval History 9/18/21:   Pt agitation and aggression resolved. Pt pleasant and calm throughout exam. Daughter reports change is \"night and day\"    Review of Systems   Review of Systems   Constitutional: Positive for fatigue. HENT: Positive for trouble swallowing. Negative for congestion and dental problem. Eyes: Positive for discharge and itching. Negative for photophobia, pain and visual disturbance. Respiratory: Negative for cough, choking and shortness of breath. Cardiovascular: Negative for chest pain and palpitations. Gastrointestinal: Negative for abdominal distention, abdominal pain, diarrhea, nausea and vomiting. Skin: Positive for rash. Neurological: Negative for dizziness, seizures, facial asymmetry, speech difficulty, light-headedness, numbness and headaches. Psychiatric/Behavioral: Negative for agitation.         Medications   Scheduled Meds:    divalproex  750 mg Oral 2 times per day    enoxaparin  40 mg SubCUTAneous Q24H    lisinopril  10 mg Oral Daily    acyclovir  10 mg/kg (Ideal) IntraVENous Q8H    polyethylene glycol  17 g Oral Daily    senna  1 tablet Oral Nightly    docusate  100 mg Oral Daily    prednisoLONE acetate  1 drop Right Eye 4 times per day    [Held by provider] QUEtiapine  25 mg Oral BID    [Held by provider] baclofen  20 mg Oral TID    calcium elemental  500 mg Oral BID    [Held by provider] magnesium oxide  400 mg Oral 4x Daily AC & HS    vitamin B-6  200 mg Oral Daily    tamsulosin  0.4 mg Oral Daily    Vitamin D  1,000 Units Oral BID AC    zinc sulfate  50 mg Oral Daily    sodium chloride flush  5-40 mL tenderness. Skin:     General: Skin is warm and dry. Findings: Rash (crusted vesicular rash of V1 distribution) present. Neurological:      Mental Status: He is alert. Coordination: Finger-Nose-Finger Test and Heel to [place] jb Test normal.      Deep Tendon Reflexes:      Reflex Scores:       Tricep reflexes are 2+ on the right side and 2+ on the left side. Bicep reflexes are 2+ on the right side and 2+ on the left side. Patellar reflexes are 2+ on the right side and 2+ on the left side. Achilles reflexes are 2+ on the right side and 2+ on the left side. Psychiatric:         Mood and Affect: Mood normal.         Speech: Speech normal.         Behavior: Behavior normal.       Neurologic Exam     Mental Status   Oriented to person. Disoriented to place. Disoriented to date. Follows 3 step commands. Attention: normal. Concentration: normal.   Speech: speech is normal   Level of consciousness: alert  Knowledge: good. Able to name object. Able to read. Able to repeat. Normal comprehension. Cranial Nerves   Cranial nerves II through XII intact.      Motor Exam   Muscle bulk: normal  Overall muscle tone: normal  BUE: 5/5  BLE: 5/5     Sensory Exam   Light touch normal.     Gait, Coordination, and Reflexes     Coordination   Finger to nose coordination: normal  Heel to shin coordination: normal    Reflexes   Right biceps: 2+  Left biceps: 2+  Right triceps: 2+  Left triceps: 2+  Right patellar: 2+  Left patellar: 2+  Right achilles: 2+  Left achilles: 2+  Gait deferred at this time         Labs/Imaging/Diagnostics   Labs:  CBC:  Recent Labs     09/16/21 0431 09/17/21 0404 09/18/21  0405   WBC 9.2 9.9 8.6   RBC 4.27* 4.20* 3.88*   HGB 12.2* 12.1* 11.3*   HCT 39.0* 38.6* 35.8*   MCV 91.3 91.9 92.3    280 287     CHEMISTRIES:  Recent Labs     09/16/21 0431 09/17/21 0404 09/18/21  0405    142 144   K 3.1* 2.8* 3.2*    107 108   CO2 25 24 27   BUN 23* 21 23* CREATININE 0.9 0.9 0.9   GLUCOSE 100 107 105   MG 2.1 2.1 2.1     PT/INR:No results for input(s): PROTIME, INR in the last 72 hours. APTT:No results for input(s): APTT in the last 72 hours. LIVER PROFILE:  No results for input(s): AST, ALT, BILIDIR, BILITOT, ALKPHOS in the last 72 hours. Imaging Last 24 Hours:  CT HEAD WO CONTRAST    Result Date: 9/7/2021  Exam: CT brain without contrast Comparison: None Clinical history: Acute mental status change Findings: The ventricles are normal in size and position. No intracranial masses or midline shift identified. No abnormal extra-axial fluid collections are seen. No acute intracranial hemorrhage. The visualized paranasal sinuses are clear. No skull fracture identified. Nonspecific tissue swelling at the right forehead and in the right malar region. Impression: 1. No acute intracranial hemorrhage or process identified. 2. Nonspecific tissue swelling at the right forehead and in the right malar region. This document has been electronically signed by: Gina Paez MD on 09/07/2021 11:02 PM All CTs at this facility use dose modulation techniques and iterative reconstructions, and/or weight-based dosing when appropriate to reduce radiation to a low as reasonably achievable. US RENAL COMPLETE    Result Date: 9/8/2021  BILATERAL RENAL ULTRASOUND: CLINICAL INFORMATION: Renal failure COMPARISON: No comparison available. TECHNIQUE: Multiple sonographic images of both kidneys were obtained. Images of the urinary bladder were also obtained. FINDINGS: RIGHT KIDNEY - 13.0 x 6.6 x 6.0 cm Resistive Index - 0.63 Cortical Thickness - 1.2 cm LEFT KIDNEY - 11.5 x 5.1 x 5.4 cm Resistive Index - 0.62 Cortical Thickness - 1.2 cm URINARY BLADDER catheter  KIDNEYS:  Both kidneys are normal in size and contour. The resistive indices are normal.  MASS/CYST: No solid or cystic lesion of either kidney is seen. HYDRONEPHROSIS:  There is no hydronephrosis.   CALCULI:  No calculi are seen. BLADDER:  Urinary bladder empty with Hampton catheter in place. Moderate amount of sludge in the gallbladder. .     Normal renal ultrasound **This report has been created using voice recognition software. It may contain minor errors which are inherent in voice recognition technology. ** Final report electronically signed by Dr. Althea Carbajal on 9/8/2021 4:08 PM    XR CHEST PORTABLE    Result Date: 9/8/2021  PROCEDURE: XR CHEST PORTABLE CLINICAL INFORMATION: 67-year-old male who underwent endotracheal and nasogastric tube placement. COMPARISON: Chest x-ray 09/08/2021. TECHNIQUE: 3 AP semiupright views of the chest were obtained. FINDINGS: There is a nasogastric tube coursing along the route of the esophagus and terminating in the stomach. There is a nasogastric tube approximately 6.5 cm from the stan. The cardiac silhouette and pulmonary vasculature are within normal limits. There is no significant pleural effusion or pneumothorax. Visualized portions of the upper abdomen are within normal limits. The osseous structures are intact. No acute fractures or suspicious osseous lesions. Interval placement of an endotracheal and nasogastric tube. Otherwise stable appearance of the chest when compared to the prior study. **This report has been created using voice recognition software. It may contain minor errors which are inherent in voice recognition technology. ** Final report electronically signed by Dr Ana Murillo on 9/8/2021 4:21 PM    XR CHEST PORTABLE    Result Date: 9/8/2021  PROCEDURE: XR CHEST PORTABLE CLINICAL INFORMATION: rhonci and obtunded. COMPARISON: No prior study. TECHNIQUE: AP upright view of the chest. FINDINGS: The heart size is at the upper limits of normal.The mediastinum is not widened. There are no pulmonary infiltrates or effusions. The pulmonary vascularity is normal. No suspicious osseous lesions are present. No acute cardiopulmonary process.  **This report has been created using voice recognition software. It may contain minor errors which are inherent in voice recognition technology. ** Final report electronically signed by Dr. Geoffrey Plasencia on 9/8/2021 7:56 AM    MRI BRAIN WO CONTRAST    Result Date: 9/8/2021  PROCEDURE: MRI BRAIN WO CONTRAST CLINICAL I/NFORMATION HCV. Unresponsive. HSV on the right forehead. Assess for HSV encephalitis. COMPARISON: Head CT 9/7/2021. TECHNIQUE: Multiplanar and multiple spin echo MRI images were obtained of the brain without contrast. FINDINGS: The diffusion-weighted images are normal.  The brain volume is mildly reduced. There is a mild amount of signal hyperintensity on the FLAIR and T2-weighted sequences in the white matter of the brain. This is consistent with mild severity chronic small vessel ischemic changes. There is no abnormal signal in the temporal lobes. There is a small area of old infarct or volume loss in the periphery of the left cerebellum. There are no intra-or extra-axial collections. There is no hydrocephalus, midline shift or mass effect. There is mineralization in the medial aspects of the basal ganglia bilaterally. The major intracranial vascular flow voids are present. The midline craniocervical junction structures are normal.  The pituitary gland and brainstem are normal. There is some fullness of the nasal mucosa. There is some trace fluid in the left mastoid air cells. 1. No acute findings. 2. Mild severity chronic small vessel ischemic changes. **This report has been created using voice recognition software. It may contain minor errors which are inherent in voice recognition technology. ** Final report electronically signed by Dr. Geoffrey Plasencia on 9/8/2021 11:48 AM  /  CXR 9/12/2021         Impression   1. Normal heart size. ET tube and NG tube in good position. Left subclavian dialysis catheter with tip at cavoatrial junction.    2. Mildly increased markings right perihilar region of both lung bases, consistent with mild atelectasis/pneumonia. Overall appearance slightly improved from prior.               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 C/W Depakote 750 mg BID  Depakote trough level today 46.1, low after only 1 dose  Repeat trough Depakote level tomorrow AM (before AM Depakote). Neurology to follow      Patient reviewed with Dr. Bradley Christensen and he is in agreement with the assessment and plan.     Electronically signed by Amy Hilario PA-C on 9/18/21

## 2021-09-18 NOTE — PROGRESS NOTES
[address]  Occupational Therapy  Daily Note  Time:   Time In: 4062  Time Out: 1119  Timed Code Treatment Minutes: 40 Minutes  Minutes: 40          Date: 2021  Patient Name: Jeanna Rivas,   Gender: male      Room: [facility]17/017-A  MRN: [id number]  : 1940  ([de-identified] y.o.)  Referring Practitioner: Giles Mills DO  Diagnosis: Herpes Zoster Opthalmicus  Additional Pertinent Hx: Presented to [facility] on 2021 with complaints of right eye swelling and pain. Noted to have some photophobia and a small rash. Started on IV acyclovir with concern for herpes zoster ophthalmicus. A rapid response called on  after patient became hypotensive with altered mental status. CT head obtained showed no acute changes. On , ASHLEY PERALTA was called with patient being subsequently intubated and transferred to the ICU. LP performed on  positive for varicella-zoster. Patient had emergent hemodialysis treatment on  out of concerns for acyclovir-induced neurotoxicity and DEANDRE. Patient also developed myoclonic jerking of his upper extremities and was started on Keppra for seizure activity. Extubated on . Restrictions/Precautions:  Restrictions/Precautions: Fall Risk  Position Activity Restriction  Other position/activity restrictions: No pregnant therapist, impulsive and agitated 9/15     SUBJECTIVE: Pt lying supine upon arrival, agreeable to OT session. PAIN: Denies. Vitals: Vitals not assessed per clinical judgement, see nursing flowsheet    COGNITION: Slow Processing, Decreased Insight, Decreased Problem Solving and Decreased Safety Awareness    ADL:   Toileting: Maximum Assistance. MaxA for hygiene after BM. Toilet Transfer: Moderate Assistance and X 1. From [facility]. BALANCE:  Sitting Balance:  Contact Guard Assistance. EOB  Standing Balance: Contact Guard Assistance.  Within Marisa Nest  x1-2 minutes within ADL task- cues for thoracic extension within [place] Stedy    BED MOBILITY:  Supine to Sit: Minimal Assistance    Scooting: Minimal Assistance EOB    TRANSFERS:  Sit to Stand: Moderate Assistance, X 1. From EOB, BSC  Stand to Sit: Contact Guard Assistance, Minimal Assistance, X 1. To control decent to various surfaces    FUNCTIONAL MOBILITY:  Assistive Device: Jolly Corti  Assist Level:  Dependent. Distance:   Bedside chair to [facility], BSC to chair. ASSESSMENT:     Activity Tolerance:  Patient tolerance of  treatment: fair. Discharge Recommendations: Continue to assess pending progress, IP Rehab, Patient would benefit from continued therapy after discharge   Equipment Recommendations: Other: continue to assess pending discharge disposition  Plan: Times per week: 5x  Current Treatment Recommendations: Strengthening, Balance Training, Functional Mobility Training, Endurance Training, Equipment Evaluation, Education, & procurement, Home Management Training, Self-Care / ADL, Patient/Caregiver Education & Training, Safety Education & Training, Neuromuscular Re-education    Patient Education  Patient Education: ADL's, Hemibody Awareness/Attention, Importance of Increasing Activity, Assistive Device Safety and Safety with transfers and mobility. Goals  Short term goals  Time Frame for Short term goals: by discharge  Short term goal 1: Pt will tolerate OTR assessment of mobility once able to maintain standing balance at Chas of 1 to increase indep with acessing environment. Short term goal 2: Pt will complete sit<>stand t/fs with Chas of 1 to increase indep with toilet hygiene. Short term goal 3: Pt will complete LB ADL with Chas of 1 to increase indep with self care. Short term goal 4: Pt will tolerate dynamic standing task with 1-2 hand release and Chas of 1 to increase balance required for grooming. Following session, patient left in safe position with all fall risk precautions in place.

## 2021-09-18 NOTE — PROGRESS NOTES
Hospitalist Progress Note    Patient:  Cecilio Padilla      Unit/Bed:4A-17/017-A    YOB: 1940    MRN: [id number]       Acct: [de-identified]     PCP: Yanna Yeh    Date of Admission: 9/6/2021    Assessment/Plan:    1. Acute encephalopathy secondary to herpes zoster infection: Mentation improved today, patient is aware of his surroundings and his current health status. MRI brain reviewed no acute findings. Continue supportive measures. D/w neurology - will d/c Keppra as it may be causing agitation and paranoia. Advised to start patient on Depakote 750 mg BID  2. Zoster ophthalmicus: Vesicular rash right V1 distribution, currently on IV acyclovir. Appreciate ID input. Continue with prednisone eyedrops per ophthalmology recommendations. 3. Seizures: EEG positive, Keppra switched to Depakote. 4. Group D Enterococcus UTI  5. Acute renal failure: Resolved, IV fluids decreased. Monitor renal function avoid nephrotoxic medications. 6. Acute hypoxemic respiratory failure secondary to infection as above: Resolved. Patient did require mechanical ventilation but has since been weaned off of oxygen completely. 7. Dysphagia: Speech eval, minced/moist and thin liquids  8. Hypertension: Continue lisinopril 10 mg daily monitor blood pressure  9. DVT prophylaxis: Lovenox  10. Disposition: Discharge planning, likely will need ECF. Updated daughter at bedside today as patient has had a great improvement in his mentation. Hospital Course:   81-year-old male admitted with herpes zoster ophthalmicus, positive LP on 9 8. Patient developed respiratory failure and was intubated on 9/8. Subsequently extubated and transferred to floor A. Currently on IV acyclovir. Patient also required short-term dialysis. Subjective (past 24 hours):   Patient seen and examined at bedside. He is currently awake and more alert and aware of his surroundings. Much improved today.   His daughter also visited and noticed a big change in his mentation. No new complaints or acute overnight events. Patient remains on room air. Medications:  Reviewed    Infusion Medications    sodium chloride 30 mL/hr at 09/16/21 1538    sodium chloride       Scheduled Medications    divalproex  750 mg Oral 2 times per day    enoxaparin  40 mg SubCUTAneous Q24H    lisinopril  10 mg Oral Daily    acyclovir  10 mg/kg (Ideal) IntraVENous Q8H    polyethylene glycol  17 g Oral Daily    senna  1 tablet Oral Nightly    docusate  100 mg Oral Daily    prednisoLONE acetate  1 drop Right Eye 4 times per day    [Held by provider] QUEtiapine  25 mg Oral BID    [Held by provider] baclofen  20 mg Oral TID    calcium elemental  500 mg Oral BID    [Held by provider] magnesium oxide  400 mg Oral 4x Daily AC & HS    vitamin B-6  200 mg Oral Daily    tamsulosin  0.4 mg Oral Daily    Vitamin D  1,000 Units Oral BID AC    zinc sulfate  50 mg Oral Daily    sodium chloride flush  5-40 mL IntraVENous 2 times per day     PRN Meds: magnesium sulfate, potassium chloride **OR** potassium alternative oral replacement **OR** potassium chloride, sodium chloride flush, sodium chloride, ondansetron **OR** ondansetron, acetaminophen **OR** acetaminophen, gabapentin, morphine      Intake/Output Summary (Last 24 hours) at 9/18/2021 1536  Last data filed at 9/18/2021 1403  Gross per 24 hour   Intake 1626.71 ml   Output 1475 ml   Net 151.71 ml       Diet:  ADULT DIET; Dysphagia - Minced and Moist; No Drinking Straws  Adult Oral Nutrition Supplement; Standard 4 oz Oral Supplement  Adult Oral Nutrition Supplement; Frozen Oral Supplement  Adult Oral Nutrition Supplement; Other Oral Supplement; yogurt    Exam:  /62   Pulse 82   Temp 98.1 °F (36.7 °C) (Oral)   Resp 18   Ht 5' 7\" (1.702 m)   Wt 192 lb 3.2 oz (87.2 kg)   SpO2 95%   BMI 30.10 kg/m²     General appearance: No apparent distress, appears stated age and cooperative.   Respiratory:  Normal respiratory effort. Clear to auscultation, bilaterally without Rales/Wheezes/Rhonchi. Cardiovascular: Regular rate and rhythm with normal S1/S2 without murmurs, rubs or gallops. Abdomen: Soft, non-tender, non-distended with normal bowel sounds. Extremities: no pedal edema  Skin: Healing vesicular rash V1 distribution on right side    Labs:   Recent Labs     09/16/21  0431 09/17/21  0404 09/18/21  0405   WBC 9.2 9.9 8.6   HGB 12.2* 12.1* 11.3*   HCT 39.0* 38.6* 35.8*    280 287     Recent Labs     09/16/21  0431 09/17/21  0404 09/18/21  0405    142 144   K 3.1* 2.8* 3.2*    107 108   CO2 25 24 27   BUN 23* 21 23*   CREATININE 0.9 0.9 0.9   CALCIUM 9.4 9.1 8.8       Microbiology:      Urinalysis:      Lab Results   Component Value Date    NITRU NEGATIVE 09/08/2021    WBCUA > 200 09/08/2021    BACTERIA FEW 09/08/2021    RBCUA 3-5 09/08/2021    BLOODU MODERATE 09/08/2021    SPECGRAV <1.005 09/08/2021       Radiology:  CT HEAD WO CONTRAST    Result Date: 9/7/2021  Exam: CT brain without contrast Comparison: None Clinical history: Acute mental status change Findings: The ventricles are normal in size and position. No intracranial masses or midline shift identified. No abnormal extra-axial fluid collections are seen. No acute intracranial hemorrhage. The visualized paranasal sinuses are clear. No skull fracture identified. Nonspecific tissue swelling at the right forehead and in the right malar region. Impression: 1. No acute intracranial hemorrhage or process identified. 2. Nonspecific tissue swelling at the right forehead and in the right malar region. This document has been electronically signed by: Kahlil Dash MD on 09/07/2021 11:02 PM All CTs at this facility use dose modulation techniques and iterative reconstructions, and/or weight-based dosing when appropriate to reduce radiation to a low as reasonably achievable.       DVT prophylaxis: [x] Lovenox                                 [] SCDs [] SQ Heparin                                 [] Encourage ambulation           [] Already on Anticoagulation     Code Status: Full Code    Tele:   [x] yes             [] no    Active Hospital Problems    Diagnosis Date Noted    DEANDRE (acute kidney injury) ([facility] [address] [N17.9]     ATN (acute tubular necrosis) ([address] [N17.0]     Hypokalemia [E87.6]     Encephalopathy [G93.40]     Herpes zoster ophthalmicus [B02.30] 09/06/2021       Electronically signed by Karime Britton MD on 9/18/2021 at 3:36 PM

## 2021-09-18 NOTE — PROGRESS NOTES
[facility]  INPATIENT PHYSICAL THERAPY  EVALUATION  [facility] 4A - 4A-17/017-A    Time In: 3584  Time Out: 1414  Timed Code Treatment Minutes: 15 Minutes  Minutes: 31          Date: 2021  Patient Name: Andrae Doll,  Gender:  male        MRN: [id number]  : 1940  ([de-identified] y.o.)  Referral Date : 21   Referring Practitioner: Landon Guzman DO  Diagnosis: Herpes zoster ophthalmicus  Additional Pertinent Hx: Per EMR:Presented to [facility] on 2021 with complaints of right eye swelling and pain. Noted to have some photophobia and a small rash. Started on IV acyclovir with concern for herpes zoster ophthalmicus. A rapid response called on  after patient became hypotensive with altered mental status. CT head obtained showed no acute changes. On , ASHLEY BLUE was called with patient being subsequently intubated and transferred to the ICU. LP performed on  positive for varicella-zoster. Patient had emergent hemodialysis treatment on  out of concerns for acyclovir-induced neurotoxicity and DEANDRE. Patient also developed myoclonic jerking of his upper extremities and was started on Keppra for seizure activity. Extubated on . Restrictions/Precautions:  Restrictions/Precautions: Fall Risk  Position Activity Restriction  Other position/activity restrictions: No pregnant therapist    Subjective:  Chart Reviewed: Yes  Patient assessed for rehabilitation services?: Yes  Family / Caregiver Present: No  Subjective: Patient in room in bed, agreeable to PT. RN okay with PT session. Pt reports feeling very tired    General:  Orientation Level: Oriented to person, Oriented to place, Oriented to time, Oriented to situation (increased time to respond)    Vision: Impaired  Vision Exceptions: Wears glasses for reading    Hearing: Within functional limits         Pain: denies.   Pt reports uncomfortable from gas in abdomen    Vitals: Vitals not assessed per clinical judgement, see nursing flowsheet    Social/Functional History:    Lives With: Spouse  Type of Home: House  Home Layout: Two level, Able to Live on Main level with bedroom/bathroom  Home Access: Level entry  Home Equipment: Rolling walker, Electric scooter     Bathroom Shower/Tub: Walk-in shower  Bathroom Toilet: Standard  Bathroom Equipment: Shower chair, Grab bars around toilet, Grab bars in shower       ADL Assistance: [facility]: Independent  Homemaking Responsibilities: Yes  Ambulation Assistance: Independent  Transfer Assistance: Independent    Active : Yes     Additional Comments: Pt was indep PTA completing majority of IADLs d/t wife with medical concerns. Pt ambulatory with walker, uses electric scooter within home    OBJECTIVE:  Range of Motion:  Right Lower Extremity: WFL  Left Lower Extremity: [facility]    Strength:  Right Lower Extremity: Impaired - hip flexion 4-/5, knee 4-/5, ankle 3+/5  Left Lower Extremity: Impaired - hip flexion 4-/5, knee 4-/5, ankle 3+/5    Balance:  Static Sitting Balance:  Contact Guard Assistance  Static Standing Balance: Minimal Assistance, Moderate Assistance    Bed Mobility:  Supine to Sit: Moderate Assistance    Transfers:  Sit to Stand: Minimal Assistance, X 2 with [place], moderate assist plus min of another with walker  Stand to [address] [facility], X 2  Pt stood with both [place] and walker. With walker pt with significant unsteadiness requiring increased assistance to stabilize and verbal and tactile cues for upright posture    Ambulation:  Not safe to attempt this date    Exercise:  Education on ankle pumps to increase mobility. Functional Outcome Measures: Completed  AM-PAC Inpatient Mobility without Stair Climbing Raw Score : 8  AM-PAC Inpatient without Stair Climbing T-Scale Score : 30.65    ASSESSMENT:  Activity Tolerance:  Patient tolerance of  treatment: good.        Treatment Initiated: Treatment and education initiated within context of evaluation. Evaluation time included review of current medical information, gathering information related to past medical, social and functional history, completion of standardized testing, formal and informal observation of tasks, assessment of data and development of plan of care and goals. Treatment time included skilled education and facilitation of tasks to increase safety and independence with functional mobility for improved independence and quality of life. Therapeutic activity completed to improve patient's ability to complete functional mobility. Assessment: Body structures, Functions, Activity limitations: Decreased functional mobility , Decreased endurance, Decreased strength, Decreased balance  Assessment: Patient is an [de-identified]year old male with recent onset of herpes zoster opthamalicus with medical complications while in hospital resulting in intubation. Pt has since been extubated and presents with decline in functional mobility compared to baseline. Patient independent at [facility] with use of RW shorter distances and independent with use of electric scooter. Pt requiring moderate assistance to complete supine to sit and sit < > stand or min of 2 and dependent with Wesley Prior to chair. Pt would benefit from skilled PT services to improve his ability to complete functional mobility, reduce his risk for falls and allow patient to return to OF.   Prognosis: Good   Co-morbidities: HTN    REQUIRES PT FOLLOW UP: Yes    Discharge Recommendations:  Discharge Recommendations: Continue to assess pending progress, Subacute/Skilled Nursing Facility, Patient would benefit from continued therapy after discharge    Patient Education:  PT Education: PT Role, Goals, Plan of Care, Functional Mobility Training    Equipment Recommendations:  Equipment Needed: No    Plan:  Times per week: 5xN  Times per day: Daily  Current Treatment Recommendations: Strengthening, Transfer Training, Endurance Training, Neuromuscular Re-education, Patient/Caregiver Education & Training, Balance Training, Home Exercise Program, Functional Mobility Training, Safety Education & Training    Goals:  Patient goals : go home  Short term goals  Time Frame for Short term goals: at discharge  Short term goal 1: Patient will complete supine < > sit with SBA to transfer in/out of bed with decreased difficulty. Short term goal 2: Patient will complete sit < > stand with CGA to stand to ambulate with decreased difficulty. Short term goal 3: Patient will tolerate 10 reps of seated exercises edge of bed to increase strength and mobility. Short term goal 4: PT to assess gait. Long term goals  Time Frame for Long term goals : N/A due to short estimated length of stay    Following session, patient left in safe position with all fall risk precautions in place.

## 2021-09-18 NOTE — PROGRESS NOTES
[address] [place] [address]  SPEECH THERAPY  [facility] 4A  Speech - Language - Cognitive Evaluation + Dysphagia Treatment      SLP Individual Minutes  Time In: 1003  Time Out: 1026  Minutes: 23  Timed Code Treatment Minutes: 0 Minutes  Dysphagia treatment - 8 minutes  Speech language cognitive evaluation - 15 minutes    Date: 2021  Patient Name: Andrae Doll      CSN: [id number]   : 1940  ([de-identified] y.o.)  Gender: male     Referring Physician: Denise Bates MD  Diagnosis: Herpes zoster ophthalmicus   Secondary Diagnosis: Dysphagia; cognitive impairment   Precautions: Contact isolation, aspiration precautions, fall risk   History of Present Illness/Injury: Patient admitted to [facility] with above medical dx. Per physician H&P, patient is a [de-identified] y. o. male who presented to [address] [place] [address] with c/o right eye swelling, crusting, pain, copious drainage. He was seen in the ER on  with a fall with similar complaints. He was evaluated and at that time it was felt that he had bacterial conjunctivitis and facial injury. He had purulent dc and a corneal abrasion per documentation and he was sent home on gentamicin drops. He was to follow up with dr. Chary sheets but returned to the ER, due to worsening pain and swelling and eye watering. In the ER, patient was felt to have herpes zoster opthalmicus and plan was made to admit to hospital for IV antiviral with optho consult in the morning. MRI of brain completed in  with results indicative of:   Impression       1. No acute findings. 2. Mild severity chronic small vessel ischemic changes. ST initially consulted to assess swallow function with further recommendations for formal evaluation of cognitive functioning in order to determine appropriate goals/POC. Past Medical History:   Diagnosis Date    Hypertension        Pain: No pain reported. Subjective:  LEVY [place] with approval for completion of cognitive evaluation.  Patient upright in bed for duration of session. Alert and pleasantly engaged throughout. SOCIAL HISTORY:   Living Arrangements: Home with wife, has 4 daughters; 1 lives in remote area  Work History: Retired - Teacher (mathematics)   Education Level: Bachelors degree  Driving Status: Active   Finance Management: Independent  Medication Management: Assistance Required - daughter completes  ADL's: Independent  Hobbies: None reported  Vision Status: Wear corrective lenses (not present)  Hearing: WFL   Type of Home: House  Home Layout: One level  Home Equipment: Rolling walker, Electric scooter    SPEECH / VOICE:  Speech and Voice appear to be grossly intact for basic and complex daily communication    LANGUAGE:  Receptive:  Receptive language skills appear to be grossly intact for basic and complex daily communication. Expressive:  Expressive language skills appear to be grossly intact for basic and complex daily communication. COGNITION:  Kael Cognitive Assessment (MOCA) version 7.2 completed. Pt scored 12/30. Normal is greater than or equal to 26/30. Orientation: 2/6  Immediate Recall: 3/5  Short-Term Recall: 0/5  Divergent Namin wpm (target=11)  Problem Solving: Impaired  Insight: Fair-good  Attention: Impaired  Math Computation: 4/5  Executive Functionin/5    SWALLOWING:  Current Diet: Minced and moist, thin liquids  Impaired - See results from previous CSE and MBS. RECOMMENDATIONS/ASSESSMENT:  DIAGNOSTIC IMPRESSIONS:  Patient presents with a moderate cognitive impairment characterized by score of 12/30 on the [facility] [place] [facility] as well as the findings outlined above. Deficits revealed in executive functioning (poor clock drawing, reduced visuospatial thinking), immediate/delayed recall, problem solving, attention, abstract thinking, and processing speed. Patient also only oriented to name and place; unaware of current year, month, HARPER, or DOM.  Patient demonstrates fair-good insight, evidenced by his awareness into current deficits, stating \"this is not my normal memory or how I usually think\", and also reporting concerns for disorientation. Given patients previous level of functioning, patient would HIGHLY benefit from ongoing skilled ST services targeting cognitive functioning as well. At this time would suspect patient to require nearly 24/7 supervision, pending cognitive gains. *would make excellent IPR candidate  Rehabilitation Potential: good. EDUCATION:  Learner: Patient  Education:  Reviewed results and recommendations of this evaluation, Reviewed diet and strategies, Reviewed ST goals and Plan of Care, Reviewed recommendations for follow-up, Education Related to Potential Risks and Complications Due to Impairment/Illness/Injury, Education Related to Prevention of Recurrence of Impairment/Illness/Injury and Home Safety Education  Evaluation of Education: Verbalizes understanding, Demonstrates with assistance, Needs further instruction and Family not present    PLAN:  Skilled SLP intervention on acute care 3-5 x per week or until goals met and/or pt plateaus in function. Specific interventions for next session may include: recall, orientation, executive functioning, pharyngeal exercises, dietary analyses, advanced PO trials. Edith Whittington PATIENT GOAL:    Did not state. Will further assess during treatment. SHORT TERM GOALS:  Short-term Goals  Timeframe for Short-term Goals: 2 weeks    Goal 1: Patient will safely consume a minced/moist diet and thin liquids (1:1 assist, limit distractions, no straws) without overt s/s of airway invasion and/or pulmonary declines to meet nutriational/hydration measures. INTERVENTIONS: Patient consumed ~3 oz of thin liquids (H2O) via cup at beginning of session; demonstrated excellent use of small sips, slow rate, and full upright positioning. NO overt s/s of aspiration.  Also discussed current diet level with LEVY Nayak and patient; both reporting no difficulties with consumption of solids. ST reviewed safe compensatory strategies in depth with patient, though patient unable to fully recite strategies with ST, patient able to recall strategies given min-mod cues. Will require ongoing education. Goal 2: Patient will complete advanced PO solids/meal trials with timely oral transit and absence of s/s of airway invasion for potential upgrade to LRD. Goal 3: Patient will complete effortful swallows with PO intake and/or following thermal/gustatory stimulation x10 good success to enhance timeliness of swallow initiation to minimize airway invasion events. Goal 4: Patient will complete orientation, immediate/delayed recall (3-4 units), and working memory tasks with 70% accuracy given mod cues to improve retention of functional information. Goal 5: Patient will complete executive functioning tasks (e.g., finances, ADL sequencing) with 70% accuracy given mod cues to improve skills required for IADL/ADL achievement. Goal 6: Patient will complete functional attention tasks with no more than 6 errors across a given task in  order to improve participation in therapeutic interventions and skills required for multi-tasking responsibilities (e.g., driving). LONG TERM GOALS:  No LTG due to short ELOS.       Isabella Vazquez M.S. [place] [id number]

## 2021-09-19 LAB
ANION GAP SERPL CALCULATED.3IONS-SCNC: 7 MEQ/L (ref 8–16)
BASOPHILS # BLD: 0.9 %
BASOPHILS ABSOLUTE: 0.1 THOU/MM3 (ref 0–0.1)
BUN BLDV-MCNC: 22 MG/DL (ref 7–22)
CALCIUM SERPL-MCNC: 8.7 MG/DL (ref 8.5–10.5)
CHLORIDE BLD-SCNC: 109 MEQ/L (ref 98–111)
CO2: 25 MEQ/L (ref 23–33)
CREAT SERPL-MCNC: 0.9 MG/DL (ref 0.4–1.2)
EOSINOPHIL # BLD: 4.6 %
EOSINOPHILS ABSOLUTE: 0.4 THOU/MM3 (ref 0–0.4)
ERYTHROCYTE [DISTWIDTH] IN BLOOD BY AUTOMATED COUNT: 13.7 % (ref 11.5–14.5)
ERYTHROCYTE [DISTWIDTH] IN BLOOD BY AUTOMATED COUNT: 45.3 FL (ref 35–45)
GFR SERPL CREATININE-BSD FRML MDRD: 81 ML/MIN/1.73M2
GLUCOSE BLD-MCNC: 110 MG/DL (ref 70–108)
HCT VFR BLD CALC: 35.6 % (ref 42–52)
HEMOGLOBIN: 11.5 GM/DL (ref 14–18)
IMMATURE GRANS (ABS): 0.11 THOU/MM3 (ref 0–0.07)
IMMATURE GRANULOCYTES: 1.3 %
LYMPHOCYTES # BLD: 22.8 %
LYMPHOCYTES ABSOLUTE: 2 THOU/MM3 (ref 1–4.8)
MCH RBC QN AUTO: 29.7 PG (ref 26–33)
MCHC RBC AUTO-ENTMCNC: 32.3 GM/DL (ref 32.2–35.5)
MCV RBC AUTO: 92 FL (ref 80–94)
MONOCYTES # BLD: 6.6 %
MONOCYTES ABSOLUTE: 0.6 THOU/MM3 (ref 0.4–1.3)
NUCLEATED RED BLOOD CELLS: 0 /100 WBC
PLATELET # BLD: 325 THOU/MM3 (ref 130–400)
PMV BLD AUTO: 9.2 FL (ref 9.4–12.4)
POTASSIUM REFLEX MAGNESIUM: 3.6 MEQ/L (ref 3.5–5.2)
RBC # BLD: 3.87 MILL/MM3 (ref 4.7–6.1)
SEG NEUTROPHILS: 63.8 %
SEGMENTED NEUTROPHILS ABSOLUTE COUNT: 5.6 THOU/MM3 (ref 1.8–7.7)
SODIUM BLD-SCNC: 141 MEQ/L (ref 135–145)
VALPROIC ACID LEVEL: 53.1 UG/ML (ref 50–100)
WBC # BLD: 8.7 THOU/MM3 (ref 4.8–10.8)

## 2021-09-19 PROCEDURE — 2580000003 HC RX 258: Performed by: FAMILY MEDICINE

## 2021-09-19 PROCEDURE — 6370000000 HC RX 637 (ALT 250 FOR IP): Performed by: FAMILY MEDICINE

## 2021-09-19 PROCEDURE — 6360000002 HC RX W HCPCS: Performed by: INTERNAL MEDICINE

## 2021-09-19 PROCEDURE — 36415 COLL VENOUS BLD VENIPUNCTURE: CPT

## 2021-09-19 PROCEDURE — 99232 SBSQ HOSP IP/OBS MODERATE 35: CPT | Performed by: INTERNAL MEDICINE

## 2021-09-19 PROCEDURE — 2580000003 HC RX 258: Performed by: INTERNAL MEDICINE

## 2021-09-19 PROCEDURE — 99232 SBSQ HOSP IP/OBS MODERATE 35: CPT | Performed by: SOCIAL WORKER

## 2021-09-19 PROCEDURE — 6370000000 HC RX 637 (ALT 250 FOR IP): Performed by: STUDENT IN AN ORGANIZED HEALTH CARE EDUCATION/TRAINING PROGRAM

## 2021-09-19 PROCEDURE — 2060000000 HC ICU INTERMEDIATE R&B

## 2021-09-19 PROCEDURE — 6360000002 HC RX W HCPCS: Performed by: HOSPITALIST

## 2021-09-19 PROCEDURE — 85025 COMPLETE CBC W/AUTO DIFF WBC: CPT

## 2021-09-19 PROCEDURE — 80048 BASIC METABOLIC PNL TOTAL CA: CPT

## 2021-09-19 PROCEDURE — 80164 ASSAY DIPROPYLACETIC ACD TOT: CPT

## 2021-09-19 RX ADMIN — TAMSULOSIN HYDROCHLORIDE 0.4 MG: 0.4 CAPSULE ORAL at 09:38

## 2021-09-19 RX ADMIN — Medication 200 MG: at 09:38

## 2021-09-19 RX ADMIN — Medication 50 MG: at 09:38

## 2021-09-19 RX ADMIN — PREDNISOLONE ACETATE 1 DROP: 10 SUSPENSION/ DROPS OPHTHALMIC at 23:23

## 2021-09-19 RX ADMIN — ACYCLOVIR SODIUM 650 MG: 50 INJECTION, SOLUTION INTRAVENOUS at 09:30

## 2021-09-19 RX ADMIN — CALCIUM 500 MG: 500 TABLET ORAL at 20:59

## 2021-09-19 RX ADMIN — PREDNISOLONE ACETATE 1 DROP: 10 SUSPENSION/ DROPS OPHTHALMIC at 11:47

## 2021-09-19 RX ADMIN — POLYETHYLENE GLYCOL (3350) 17 G: 17 POWDER, FOR SOLUTION ORAL at 09:38

## 2021-09-19 RX ADMIN — LISINOPRIL 10 MG: 10 TABLET ORAL at 09:38

## 2021-09-19 RX ADMIN — ACYCLOVIR SODIUM 650 MG: 50 INJECTION, SOLUTION INTRAVENOUS at 23:23

## 2021-09-19 RX ADMIN — CALCIUM 500 MG: 500 TABLET ORAL at 09:38

## 2021-09-19 RX ADMIN — ENOXAPARIN SODIUM 40 MG: 40 INJECTION SUBCUTANEOUS at 20:59

## 2021-09-19 RX ADMIN — DIVALPROEX SODIUM 750 MG: 500 TABLET, DELAYED RELEASE ORAL at 09:38

## 2021-09-19 RX ADMIN — SODIUM CHLORIDE, PRESERVATIVE FREE 10 ML: 5 INJECTION INTRAVENOUS at 09:30

## 2021-09-19 RX ADMIN — Medication 1000 UNITS: at 16:07

## 2021-09-19 RX ADMIN — PREDNISOLONE ACETATE 1 DROP: 10 SUSPENSION/ DROPS OPHTHALMIC at 06:14

## 2021-09-19 RX ADMIN — SENNOSIDES 8.6 MG: 8.6 TABLET, COATED ORAL at 20:59

## 2021-09-19 RX ADMIN — SODIUM CHLORIDE, PRESERVATIVE FREE 10 ML: 5 INJECTION INTRAVENOUS at 20:59

## 2021-09-19 RX ADMIN — DIVALPROEX SODIUM 750 MG: 500 TABLET, DELAYED RELEASE ORAL at 20:59

## 2021-09-19 RX ADMIN — ACYCLOVIR SODIUM 650 MG: 50 INJECTION, SOLUTION INTRAVENOUS at 17:01

## 2021-09-19 RX ADMIN — PREDNISOLONE ACETATE 1 DROP: 10 SUSPENSION/ DROPS OPHTHALMIC at 17:10

## 2021-09-19 RX ADMIN — ACYCLOVIR SODIUM 650 MG: 50 INJECTION, SOLUTION INTRAVENOUS at 00:17

## 2021-09-19 ASSESSMENT — ENCOUNTER SYMPTOMS
SHORTNESS OF BREATH: 0
DIARRHEA: 0
EYE ITCHING: 1
VOMITING: 0
ABDOMINAL DISTENTION: 0
TROUBLE SWALLOWING: 1
CHOKING: 0
COUGH: 0
EYE PAIN: 0
EYE DISCHARGE: 1
PHOTOPHOBIA: 0
NAUSEA: 0
ABDOMINAL PAIN: 0

## 2021-09-19 ASSESSMENT — PAIN SCALES - GENERAL
PAINLEVEL_OUTOF10: 0

## 2021-09-19 NOTE — PROGRESS NOTES
Neurology Progress Note    Date:9/19/2021       PNVO:[id number]  Patient Name:Miah Blank     YOB: 1940     Age:81 y.o.    CC:   Chief Complaint   Patient presents with    Eye Pain        Subjective      Carl Reyes is an [de-identified] M with PMH of HTN who presented to [facility] with painful lesions, facial swelling since and R eye drainage and photophobia which began to develop on 9/4/21. History of shingles unknown, no hx of shingles immunization. No additional history available due to the condition of the patient. 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 His acyclovir is currently being held due to concerns regarding his kidney function. Overnight, his nurse noted some myoclonic jerking in his upper extremities which went on to affect his lower extremities. He is not currently on any antiepileptic medications but was on propofol and Versed at the time. Interval history 9/10/2021:  Acyclovir held yesterday d/t DEANDRE. Plan to resume today per nephro and ID. Dialysis cath placed yesterday. Underwent dialysis last night. No witnessed seizure activity since starting the keppra. Interval History 9/11/2021:  Patient on acyclovir. No new seizure activity with loading dose of keprra 1000mg and malignance dose increase of 1000mg BID. He is off sedation, withdrawing to pain, not following commands at this time. Interval History 9/12/2021:  No acute event overnight. Patient remains of acyclovir. Per RN last evening they took him off sedation and he was able to squeeze hands, and raise his eyebrows. He was started back on sedation overnight because he was having increased work of breathing. No seizure activity noted overnight.  Sedation was stopped today in rounds and patient is opening eyes, and following commands such as squeezing hands    Interval History 9/13/2021:  No acute changes overnight. Patient was extubated this morning at 0815. He is doing well with extubation. He is following commands and is trying to talk, but it very raspy after being extubated    Interval History 9/17/21:  Neuro returning as for question re management of antiepileptic drugs. Pt oriented x1 but now aggressive with staff, agitated. Interval History 9/18/21:   Pt agitation and aggression resolved. Pt pleasant and calm throughout exam. Daughter reports change is \"night and day\"    Interval History 9/19/21:   NAEO    Review of Systems   Review of Systems   Constitutional: Positive for fatigue. HENT: Positive for trouble swallowing. Negative for congestion and dental problem. Eyes: Positive for discharge and itching. Negative for photophobia, pain and visual disturbance. Respiratory: Negative for cough, choking and shortness of breath. Cardiovascular: Negative for chest pain and palpitations. Gastrointestinal: Negative for abdominal distention, abdominal pain, diarrhea, nausea and vomiting. Skin: Positive for rash. Neurological: Negative for dizziness, seizures, facial asymmetry, speech difficulty, light-headedness, numbness and headaches. Psychiatric/Behavioral: Negative for agitation.         Medications   Scheduled Meds:    divalproex  750 mg Oral 2 times per day    enoxaparin  40 mg SubCUTAneous Q24H    lisinopril  10 mg Oral Daily    acyclovir  10 mg/kg (Ideal) IntraVENous Q8H    polyethylene glycol  17 g Oral Daily    senna  1 tablet Oral Nightly    docusate  100 mg Oral Daily    prednisoLONE acetate  1 drop Right Eye 4 times per day    [Held by provider] QUEtiapine  25 mg Oral BID    [Held by provider] baclofen  20 mg Oral TID    calcium elemental  500 mg Oral BID    [Held by provider] magnesium oxide  400 mg Oral 4x Daily AC & HS    vitamin B-6  200 mg Oral Daily    tamsulosin  0.4 mg Oral Daily    Vitamin D  1,000 Units Oral BID AC    zinc sulfate  50 mg Oral Daily    sodium chloride flush  5-40 mL IntraVENous 2 times per day     Continuous Infusions:    sodium chloride 30 mL/hr at 21 1538    sodium chloride       PRN Meds: guaiFENesin-dextromethorphan, magnesium sulfate, potassium chloride **OR** potassium alternative oral replacement **OR** potassium chloride, sodium chloride flush, sodium chloride, ondansetron **OR** ondansetron, acetaminophen **OR** acetaminophen, gabapentin, morphine    Past History    Past Medical History:   has a past medical history of Hypertension. Social History:   reports that he quit smoking about 51 years ago. His smoking use included cigarettes. He has a 10.00 pack-year smoking history. He has never used smokeless tobacco. He reports that he does not drink alcohol and does not use drugs. Family History:   Family History   Problem Relation Age of Onset    High Blood Pressure Mother     No Known Problems Father        Physical Examination      Vitals:  /71   Pulse 71   Temp 98.6 °F (37 °C) (Axillary)   Resp 16   Ht 5' 7\" (1.702 m)   Wt 192 lb 6.4 oz (87.3 kg)   SpO2 93%   BMI 30.13 kg/m²   Temp (24hrs), Av.4 °F (36.9 °C), Min:98.1 °F (36.7 °C), Max:98.9 °F (37.2 °C)      I/O (24Hr): Intake/Output Summary (Last 24 hours) at 2021 0805  Last data filed at 2021 0402  Gross per 24 hour   Intake 111 ml   Output 1125 ml   Net -1014 ml       Physical Exam  Vitals reviewed. Constitutional:       Appearance: He is normal weight. HENT:      Head: Normocephalic and atraumatic. Right Ear: External ear normal.      Left Ear: External ear normal.      Mouth/Throat:      Mouth: Mucous membranes are moist.   Eyes:      General:         Right eye: Discharge present. Comments: Conjunctiva injected bilaterally   Pulmonary:      Effort: Pulmonary effort is normal. No respiratory distress. Abdominal:      General: Abdomen is flat. Palpations: Abdomen is soft.    Musculoskeletal:         General: No deformity or signs of injury. Cervical back: No rigidity or tenderness. Skin:     General: Skin is warm and dry. Findings: Rash (crusted vesicular rash of V1 distribution) present. Neurological:      Mental Status: He is alert. Psychiatric:         Mood and Affect: Mood normal.         Speech: Speech normal.         Behavior: Behavior normal.       Neurologic Exam     Mental Status   Oriented to person. Disoriented to place. Disoriented to date. Follows 3 step commands. Attention: normal. Concentration: normal.   Speech: speech is normal   Level of consciousness: alert  Knowledge: good. Able to name object. Able to read. Able to repeat. Normal comprehension. Cranial Nerves   Cranial nerves II through XII intact. Motor Exam   Muscle bulk: normal  Overall muscle tone: normal  BUE: 5/5  BLE: 5/5     Sensory Exam   Light touch normal.     Gait, Coordination, and Reflexes           Labs/Imaging/Diagnostics   Labs:  CBC:  Recent Labs     09/17/21 0404 09/18/21 0405 09/19/21  0415   WBC 9.9 8.6 8.7   RBC 4.20* 3.88* 3.87*   HGB 12.1* 11.3* 11.5*   HCT 38.6* 35.8* 35.6*   MCV 91.9 92.3 92.0    287 325     CHEMISTRIES:  Recent Labs     09/17/21  0404 09/18/21  0405 09/19/21  0415    144 141   K 2.8* 3.2* 3.6    108 109   CO2 24 27 25   BUN 21 23* 22   CREATININE 0.9 0.9 0.9   GLUCOSE 107 105 110*   MG 2.1 2.1  --      PT/INR:No results for input(s): PROTIME, INR in the last 72 hours. APTT:No results for input(s): APTT in the last 72 hours. LIVER PROFILE:  No results for input(s): AST, ALT, BILIDIR, BILITOT, ALKPHOS in the last 72 hours. Imaging Last 24 Hours:  CT HEAD WO CONTRAST    Result Date: 9/7/2021  Exam: CT brain without contrast Comparison: None Clinical history: Acute mental status change Findings: The ventricles are normal in size and position. No intracranial masses or midline shift identified. No abnormal extra-axial fluid collections are seen.  No acute intracranial hemorrhage. The visualized paranasal sinuses are clear. No skull fracture identified. Nonspecific tissue swelling at the right forehead and in the right malar region. Impression: 1. No acute intracranial hemorrhage or process identified. 2. Nonspecific tissue swelling at the right forehead and in the right malar region. This document has been electronically signed by: Santa Weston MD on 09/07/2021 11:02 PM All CTs at this facility use dose modulation techniques and iterative reconstructions, and/or weight-based dosing when appropriate to reduce radiation to a low as reasonably achievable. US RENAL COMPLETE    Result Date: 9/8/2021  BILATERAL RENAL ULTRASOUND: CLINICAL INFORMATION: Renal failure COMPARISON: No comparison available. TECHNIQUE: Multiple sonographic images of both kidneys were obtained. Images of the urinary bladder were also obtained. FINDINGS: RIGHT KIDNEY - 13.0 x 6.6 x 6.0 cm Resistive Index - 0.63 Cortical Thickness - 1.2 cm LEFT KIDNEY - 11.5 x 5.1 x 5.4 cm Resistive Index - 0.62 Cortical Thickness - 1.2 cm URINARY BLADDER catheter  KIDNEYS:  Both kidneys are normal in size and contour. The resistive indices are normal.  MASS/CYST: No solid or cystic lesion of either kidney is seen. HYDRONEPHROSIS:  There is no hydronephrosis. CALCULI:  No calculi are seen. BLADDER:  Urinary bladder empty with Hampton catheter in place. Moderate amount of sludge in the gallbladder. .     Normal renal ultrasound **This report has been created using voice recognition software. It may contain minor errors which are inherent in voice recognition technology. ** Final report electronically signed by Dr. Laurann Severe on 9/8/2021 4:08 PM    XR CHEST PORTABLE    Result Date: 9/8/2021  PROCEDURE: XR CHEST PORTABLE CLINICAL INFORMATION: 61-year-old male who underwent endotracheal and nasogastric tube placement. COMPARISON: Chest x-ray 09/08/2021.  TECHNIQUE: 3 AP semiupright views of the chest were obtained. FINDINGS: There is a nasogastric tube coursing along the route of the esophagus and terminating in the stomach. There is a nasogastric tube approximately 6.5 cm from the stan. The cardiac silhouette and pulmonary vasculature are within normal limits. There is no significant pleural effusion or pneumothorax. Visualized portions of the upper abdomen are within normal limits. The osseous structures are intact. No acute fractures or suspicious osseous lesions. Interval placement of an endotracheal and nasogastric tube. Otherwise stable appearance of the chest when compared to the prior study. **This report has been created using voice recognition software. It may contain minor errors which are inherent in voice recognition technology. ** Final report electronically signed by Dr Pari Dubose on 9/8/2021 4:21 PM    XR CHEST PORTABLE    Result Date: 9/8/2021  PROCEDURE: XR CHEST PORTABLE CLINICAL INFORMATION: rhonci and obtunded. COMPARISON: No prior study. TECHNIQUE: AP upright view of the chest. FINDINGS: The heart size is at the upper limits of normal.The mediastinum is not widened. There are no pulmonary infiltrates or effusions. The pulmonary vascularity is normal. No suspicious osseous lesions are present. No acute cardiopulmonary process. **This report has been created using voice recognition software. It may contain minor errors which are inherent in voice recognition technology. ** Final report electronically signed by Dr. Richard Paul on 9/8/2021 7:56 AM    MRI BRAIN WO CONTRAST    Result Date: 9/8/2021  PROCEDURE: MRI BRAIN WO CONTRAST CLINICAL I/NFORMATION HCV. Unresponsive. HSV on the right forehead. Assess for HSV encephalitis. COMPARISON: Head CT 9/7/2021. TECHNIQUE: Multiplanar and multiple spin echo MRI images were obtained of the brain without contrast. FINDINGS: The diffusion-weighted images are normal.  The brain volume is mildly reduced.  There is a mild amount of signal hyperintensity on the FLAIR and T2-weighted sequences in the white matter of the brain. This is consistent with mild severity chronic small vessel ischemic changes. There is no abnormal signal in the temporal lobes. There is a small area of old infarct or volume loss in the periphery of the left cerebellum. There are no intra-or extra-axial collections. There is no hydrocephalus, midline shift or mass effect. There is mineralization in the medial aspects of the basal ganglia bilaterally. The major intracranial vascular flow voids are present. The midline craniocervical junction structures are normal.  The pituitary gland and brainstem are normal. There is some fullness of the nasal mucosa. There is some trace fluid in the left mastoid air cells. 1. No acute findings. 2. Mild severity chronic small vessel ischemic changes. **This report has been created using voice recognition software. It may contain minor errors which are inherent in voice recognition technology. ** Final report electronically signed by Dr. Manpreet Hastings on 9/8/2021 11:48 AM  /  CXR 9/12/2021         Impression   1. Normal heart size. ET tube and NG tube in good position. Left subclavian dialysis catheter with tip at cavoatrial junction. 2. Mildly increased markings right perihilar region of both lung bases, consistent with mild atelectasis/pneumonia. Overall appearance slightly improved from prior.               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 C/W Depakote 750 mg BID  Depakote trough level today 53.1, within therapeutic range  Follow up with outpatient neurology after discharge. Neurology will sign off. Please contact us with any questions.      Thank you for the opportunity to participate in the care of Mr. Laura Bennett. Patient reviewed with Dr. Percell Bosworth and he is in agreement with the assessment and plan.     Electronically signed by Rona Altamirano PA-C on 9/18/21

## 2021-09-19 NOTE — PROGRESS NOTES
Hospitalist Progress Note      Name:  Deangelo Maradiaga /Age/Sex: 1940  ([de-identified] y.o. male)   MRN & CSN:  [id number] & [id number] Admission Date/Time: 2021 11:26 AM   Location:  [facility] PCP: Ximena Galvan Day: 14    Assessment and Plan:   Deangelo Maradiaga is a [de-identified] y.o.  male with past medical history hypertension, BPH who presented with right eye swelling and admitted for herpes zoster ophthalmicus, developed encephalopathy and respiratory failure with intubation and admission to ICU and extubated on     Acute encephalopathy secondary to herpes zoster infection  Possible superimposed side effect of Keppra      Mentation continues to improve -patient is aware of his surroundings and his current health status. MRI brain reviewed no acute findings. Continue supportive measures. Per neurology Debroah Money was discontinued and started on Depakote    Herpes Zoster ophthalmicus - V1 distribution     Vesicular rash right V1 distribution, currently on IV acyclovir. Appreciate ID input    Continue with prednisone eyedrops per ophthalmology recommendations. Seizure      Witnessed seizure activity while in hospital and EEG report suggestive  EEG  reported with periodic lateralized discharges and excessive slowing in the delta range  He was started on Keppra but discontinued due to agitation and aggression -likely side effects   Now on Depakote -per neurology     Group D Enterococcus UTI - completed treatment     Acute renal failure: Resolved    DC IV fluids, continue to monitor renal function avoid nephrotoxic medications.     Acute hypoxemic respiratory failure - resolved     Required mechanical ventilation but has since been weaned off of oxygen completely    Hypokalemia - resolved     Dysphagia: Speech eval, minced/moist and thin liquids    Hypertension: Continue lisinopril 10 mg daily monitor blood pressure    DVT prophylaxis: Lovenox    Disposition: Discharge planning, likely will need ECF.  family (daughter and wife) updated regarding plan       Diet ADULT DIET; Dysphagia - Minced and Moist; No Drinking Straws  Adult Oral Nutrition Supplement; Standard 4 oz Oral Supplement  Adult Oral Nutrition Supplement; Frozen Oral Supplement  Adult Oral Nutrition Supplement; Other Oral Supplement; yogurt   DVT Prophylaxis [] Lovenox, []  Heparin, [] SCDs, []No VTE prophylaxis, patient ambulating   GI Prophylaxis [] PPI, [] H2 Blocker, [] No GI prophylaxis, patient is receiving diet/Tube Feeds   Code Status Full Code   Disposition Patient requires continued admission due to    MDM [] Low, [x] Moderate,[]  High     History of Present Illness: Subjective     Patient Seen & Examined at the bedside   Patient's wife and daughter at the bedside  He appears to be more alert, oriented and close to his baseline mental status per family    He denies any nausea or vomiting,   he is interested in participating with therapy    Ten point ROS reviewed negative, unless as noted above    Objective: Intake/Output Summary (Last 24 hours) at 9/19/2021 1023  Last data filed at 9/19/2021 0402  Gross per 24 hour   Intake 111 ml   Output 1125 ml   Net -1014 ml      Vitals:   Vitals:    09/19/21 0927   BP: 125/74   Pulse: 77   Resp: 16   Temp: 97.8 °F (36.6 °C)   SpO2: 94%     Physical Exam:    GEN Awake male, resting in bed in no apparent distress. Appears given age. HENT Mucous membranes are moist.  Right eye and right side of forehead crusting lesions with some vesicular rash  RESP Clear to auscultation, no wheezes, rales or rhonchi. CARDIO/VASC -S1/S2 auscultated. Regular rate without appreciable murmurs, rubs, or gallops. Peripheral pulses equal bilaterally and palpable. No peripheral edema. GI Abdomen is soft without significant tenderness, masses, or guarding. Bowel sounds are normoactive. Rectal exam deferred. MSK No gross joint deformities.  Spontaneous movement of all extremities    Medications:   Medications:  divalproex  750 mg Oral 2 times per day    enoxaparin  40 mg SubCUTAneous Q24H    lisinopril  10 mg Oral Daily    acyclovir  10 mg/kg (Ideal) IntraVENous Q8H    polyethylene glycol  17 g Oral Daily    senna  1 tablet Oral Nightly    docusate  100 mg Oral Daily    prednisoLONE acetate  1 drop Right Eye 4 times per day    [Held by provider] QUEtiapine  25 mg Oral BID    [Held by provider] baclofen  20 mg Oral TID    calcium elemental  500 mg Oral BID    [Held by provider] magnesium oxide  400 mg Oral 4x Daily AC & HS    vitamin B-6  200 mg Oral Daily    tamsulosin  0.4 mg Oral Daily    Vitamin D  1,000 Units Oral BID AC    zinc sulfate  50 mg Oral Daily    sodium chloride flush  5-40 mL IntraVENous 2 times per day      Infusions:    sodium chloride 30 mL/hr at 09/16/21 1538    sodium chloride       PRN Meds: guaiFENesin-dextromethorphan, 5 mL, Q4H PRN  magnesium sulfate, 2,000 mg, PRN  potassium chloride, 40 mEq, PRN   Or  potassium alternative oral replacement, 40 mEq, PRN   Or  potassium chloride, 10 mEq, PRN  sodium chloride flush, 5-40 mL, PRN  sodium chloride, 25 mL, PRN  ondansetron, 4 mg, Q8H PRN   Or  ondansetron, 4 mg, Q6H PRN  acetaminophen, 650 mg, Q6H PRN   Or  acetaminophen, 650 mg, Q6H PRN  gabapentin, 300 mg, TID PRN  morphine, 4 mg, Q4H PRN          Electronically signed by Ortega Franco MD on 9/19/2021 at 10:23 AM

## 2021-09-20 VITALS
SYSTOLIC BLOOD PRESSURE: 118 MMHG | DIASTOLIC BLOOD PRESSURE: 79 MMHG | BODY MASS INDEX: 30.37 KG/M2 | WEIGHT: 193.5 LBS | TEMPERATURE: 97.7 F | HEIGHT: 67 IN | OXYGEN SATURATION: 96 % | RESPIRATION RATE: 16 BRPM | HEART RATE: 88 BPM

## 2021-09-20 LAB
ANION GAP SERPL CALCULATED.3IONS-SCNC: 9 MEQ/L (ref 8–16)
BASOPHILS # BLD: 0.7 %
BASOPHILS ABSOLUTE: 0.1 THOU/MM3 (ref 0–0.1)
BUN BLDV-MCNC: 18 MG/DL (ref 7–22)
CALCIUM SERPL-MCNC: 8.8 MG/DL (ref 8.5–10.5)
CHLORIDE BLD-SCNC: 105 MEQ/L (ref 98–111)
CO2: 27 MEQ/L (ref 23–33)
CREAT SERPL-MCNC: 0.8 MG/DL (ref 0.4–1.2)
EOSINOPHIL # BLD: 4.3 %
EOSINOPHILS ABSOLUTE: 0.4 THOU/MM3 (ref 0–0.4)
ERYTHROCYTE [DISTWIDTH] IN BLOOD BY AUTOMATED COUNT: 13.8 % (ref 11.5–14.5)
ERYTHROCYTE [DISTWIDTH] IN BLOOD BY AUTOMATED COUNT: 45.4 FL (ref 35–45)
GFR SERPL CREATININE-BSD FRML MDRD: > 90 ML/MIN/1.73M2
GLUCOSE BLD-MCNC: 99 MG/DL (ref 70–108)
HCT VFR BLD CALC: 36.7 % (ref 42–52)
HEMOGLOBIN: 11.6 GM/DL (ref 14–18)
IMMATURE GRANS (ABS): 0.08 THOU/MM3 (ref 0–0.07)
IMMATURE GRANULOCYTES: 0.9 %
LYMPHOCYTES # BLD: 21.2 %
LYMPHOCYTES ABSOLUTE: 1.9 THOU/MM3 (ref 1–4.8)
MCH RBC QN AUTO: 29.1 PG (ref 26–33)
MCHC RBC AUTO-ENTMCNC: 31.6 GM/DL (ref 32.2–35.5)
MCV RBC AUTO: 92 FL (ref 80–94)
MONOCYTES # BLD: 6.7 %
MONOCYTES ABSOLUTE: 0.6 THOU/MM3 (ref 0.4–1.3)
NUCLEATED RED BLOOD CELLS: 0 /100 WBC
PLATELET # BLD: 329 THOU/MM3 (ref 130–400)
PMV BLD AUTO: 9.1 FL (ref 9.4–12.4)
POTASSIUM REFLEX MAGNESIUM: 3.8 MEQ/L (ref 3.5–5.2)
RBC # BLD: 3.99 MILL/MM3 (ref 4.7–6.1)
SARS-COV-2, NAAT: NOT DETECTED
SEG NEUTROPHILS: 66.2 %
SEGMENTED NEUTROPHILS ABSOLUTE COUNT: 5.8 THOU/MM3 (ref 1.8–7.7)
SODIUM BLD-SCNC: 141 MEQ/L (ref 135–145)
WBC # BLD: 8.8 THOU/MM3 (ref 4.8–10.8)

## 2021-09-20 PROCEDURE — 2580000003 HC RX 258: Performed by: INTERNAL MEDICINE

## 2021-09-20 PROCEDURE — 99239 HOSP IP/OBS DSCHRG MGMT >30: CPT | Performed by: INTERNAL MEDICINE

## 2021-09-20 PROCEDURE — 97110 THERAPEUTIC EXERCISES: CPT

## 2021-09-20 PROCEDURE — 97530 THERAPEUTIC ACTIVITIES: CPT

## 2021-09-20 PROCEDURE — 2580000003 HC RX 258: Performed by: FAMILY MEDICINE

## 2021-09-20 PROCEDURE — 6360000002 HC RX W HCPCS: Performed by: INTERNAL MEDICINE

## 2021-09-20 PROCEDURE — 6370000000 HC RX 637 (ALT 250 FOR IP): Performed by: FAMILY MEDICINE

## 2021-09-20 PROCEDURE — 80048 BASIC METABOLIC PNL TOTAL CA: CPT

## 2021-09-20 PROCEDURE — 97535 SELF CARE MNGMENT TRAINING: CPT

## 2021-09-20 PROCEDURE — 6370000000 HC RX 637 (ALT 250 FOR IP): Performed by: STUDENT IN AN ORGANIZED HEALTH CARE EDUCATION/TRAINING PROGRAM

## 2021-09-20 PROCEDURE — 87635 SARS-COV-2 COVID-19 AMP PRB: CPT

## 2021-09-20 PROCEDURE — 85025 COMPLETE CBC W/AUTO DIFF WBC: CPT

## 2021-09-20 PROCEDURE — 36415 COLL VENOUS BLD VENIPUNCTURE: CPT

## 2021-09-20 RX ORDER — GABAPENTIN 300 MG/1
300 CAPSULE ORAL 3 TIMES DAILY PRN
Qty: 90 CAPSULE | Refills: 3 | Status: SHIPPED | OUTPATIENT
Start: 2021-09-20 | End: 2021-10-20

## 2021-09-20 RX ORDER — DIVALPROEX SODIUM 250 MG/1
750 TABLET, DELAYED RELEASE ORAL EVERY 12 HOURS SCHEDULED
Qty: 90 TABLET | Refills: 3 | Status: SHIPPED | OUTPATIENT
Start: 2021-09-20 | End: 2022-06-10 | Stop reason: SDUPTHER

## 2021-09-20 RX ORDER — SENNA PLUS 8.6 MG/1
1 TABLET ORAL NIGHTLY
Qty: 30 TABLET | Refills: 0 | Status: SHIPPED | OUTPATIENT
Start: 2021-09-20 | End: 2021-10-20

## 2021-09-20 RX ORDER — ACYCLOVIR 800 MG/1
800 TABLET ORAL 3 TIMES DAILY
Qty: 21 TABLET | Refills: 0 | Status: SHIPPED | OUTPATIENT
Start: 2021-09-20 | End: 2021-09-27

## 2021-09-20 RX ORDER — POLYETHYLENE GLYCOL 3350 17 G/17G
17 POWDER, FOR SOLUTION ORAL DAILY
Qty: 527 G | Refills: 1 | Status: SHIPPED | OUTPATIENT
Start: 2021-09-21 | End: 2021-10-21

## 2021-09-20 RX ORDER — PREDNISOLONE ACETATE 10 MG/ML
1 SUSPENSION/ DROPS OPHTHALMIC 4 TIMES DAILY
Qty: 1 EACH | Refills: 0 | Status: SHIPPED | OUTPATIENT
Start: 2021-09-20 | End: 2022-01-31

## 2021-09-20 RX ADMIN — Medication 1000 UNITS: at 05:54

## 2021-09-20 RX ADMIN — LISINOPRIL 10 MG: 10 TABLET ORAL at 09:12

## 2021-09-20 RX ADMIN — ACYCLOVIR SODIUM 650 MG: 50 INJECTION, SOLUTION INTRAVENOUS at 09:19

## 2021-09-20 RX ADMIN — Medication 200 MG: at 09:12

## 2021-09-20 RX ADMIN — ACETAMINOPHEN 650 MG: 325 TABLET ORAL at 09:12

## 2021-09-20 RX ADMIN — POLYETHYLENE GLYCOL (3350) 17 G: 17 POWDER, FOR SOLUTION ORAL at 09:12

## 2021-09-20 RX ADMIN — DOCUSATE SODIUM 100 MG: 50 LIQUID ORAL at 09:12

## 2021-09-20 RX ADMIN — CALCIUM 500 MG: 500 TABLET ORAL at 09:12

## 2021-09-20 RX ADMIN — Medication 50 MG: at 09:12

## 2021-09-20 RX ADMIN — TAMSULOSIN HYDROCHLORIDE 0.4 MG: 0.4 CAPSULE ORAL at 09:12

## 2021-09-20 RX ADMIN — PREDNISOLONE ACETATE 1 DROP: 10 SUSPENSION/ DROPS OPHTHALMIC at 05:54

## 2021-09-20 RX ADMIN — SODIUM CHLORIDE, PRESERVATIVE FREE 10 ML: 5 INJECTION INTRAVENOUS at 09:12

## 2021-09-20 RX ADMIN — DIVALPROEX SODIUM 750 MG: 500 TABLET, DELAYED RELEASE ORAL at 09:12

## 2021-09-20 RX ADMIN — GABAPENTIN 300 MG: 300 CAPSULE ORAL at 09:12

## 2021-09-20 RX ADMIN — PREDNISOLONE ACETATE 1 DROP: 10 SUSPENSION/ DROPS OPHTHALMIC at 13:04

## 2021-09-20 ASSESSMENT — PAIN SCALES - GENERAL
PAINLEVEL_OUTOF10: 5
PAINLEVEL_OUTOF10: 0
PAINLEVEL_OUTOF10: 0

## 2021-09-20 NOTE — CARE COORDINATION
9/20/21, 11:54 AM EDT    Patient goals/plan/ treatment preferences discussed by  and . Patient goals/plan/ treatment preferences reviewed with patient/ family. Patient/ family verbalize understanding of discharge plan and are in agreement with goal/plan/treatment preferences. Understanding was demonstrated using the teach back method. AVS provided by RN at time of discharge, which includes all necessary medical information pertaining to the patients current course of illness, treatment, post-discharge goals of care, and treatment preferences. Services After Discharge  Services At/After Discharge: PT, OT, Nursing Services, Juju, In ambulance ([facility])   IMM Letter  IMM Letter given to Patient/Family/Significant other/Guardian/POA/by[de-identified] CM  IMM Letter date given[de-identified] 09/20/21  IMM Letter time given[de-identified] [id number]       Patient will be discharged to [facility] on today. He will be skilled under his [employer] Medicare benefit. Patient will transported by ambulance. Discharge instructions and transport forms are attached to blue discharge packet. Spoke with daughter Elvie Kingston, she and her mother are agreeable to discharge plan.  Ernestine at the [facility] is aware that transport is set up for 5:30pm.

## 2021-09-20 NOTE — PROGRESS NOTES
Progress note: Infectious diseases    Patient - Balta Vanegas,  Age - [de-identified] y.o.    - 1940      Room Number - 2R-97/259-W   MRN -  [id number]   Acct # - [de-identified]  Date of Admission -  2021 11:26 AM    SUBJECTIVE:   He is much improved after the keppra was stopped  Plan for ECF. OBJECTIVE   VITALS    height is 5' 7\" (1.702 m) and weight is 193 lb 8 oz (87.8 kg). His oral temperature is 97.7 °F (36.5 °C). His blood pressure is 118/79 and his pulse is 88. His respiration is 16 and oxygen saturation is 96%. Wt Readings from Last 3 Encounters:   21 193 lb 8 oz (87.8 kg)   21 210 lb (95.3 kg)   19 207 lb 3.2 oz (94 kg)       I/O (24 Hours)    Intake/Output Summary (Last 24 hours) at 2021 1418  Last data filed at 2021 1217  Gross per 24 hour   Intake 573 ml   Output 2175 ml   Net -1602 ml       General Appearance:  Awake, alert, oriented   HEENT - normocephalic, atraumatic, pink conjunctiva,  anicteric sclera, VZV rash on right face in v1 distribution with scabbing of vesicles  Neck - Supple, no mass   Lungs -  Bilateral  air entry,    Cardiovascular - Heart sounds are normal.  Regular rate and rhythm without murmur, gallop or rub. Abdomen - soft, not distended, nontender, bowel sounds present and normoactive in all quadrants   - white/yellow discharge from the urethra with Hampton catheter in place. Neurologic - No focal deficits  Skin - No bruising or bleeding.  Vesicles appearing on lower extremity skin,   Extremities - no rash  MEDICATIONS:      divalproex  750 mg Oral 2 times per day    enoxaparin  40 mg SubCUTAneous Q24H    lisinopril  10 mg Oral Daily    acyclovir  10 mg/kg (Ideal) IntraVENous Q8H    polyethylene glycol  17 g Oral Daily    senna  1 tablet Oral Nightly    docusate  100 mg Oral Daily    prednisoLONE acetate  1 drop Right Eye 4 times per day    [Held by provider] QUEtiapine  25 mg Oral BID    [Held by provider] baclofen  20 mg Oral TID   Lili Nicole calcium elemental  500 mg Oral BID    [Held by provider] magnesium oxide  400 mg Oral 4x Daily AC & HS    vitamin B-6  200 mg Oral Daily    tamsulosin  0.4 mg Oral Daily    Vitamin D  1,000 Units Oral BID AC    zinc sulfate  50 mg Oral Daily    sodium chloride flush  5-40 mL IntraVENous 2 times per day      sodium chloride       guaiFENesin-dextromethorphan, magnesium sulfate, potassium chloride **OR** potassium alternative oral replacement **OR** potassium chloride, sodium chloride flush, sodium chloride, ondansetron **OR** ondansetron, acetaminophen **OR** acetaminophen, gabapentin, morphine      LABS:     CBC:   Recent Labs     09/18/21  0405 09/19/21  0415 09/20/21  0416   WBC 8.6 8.7 8.8   HGB 11.3* 11.5* 11.6*    325 329     BMP:    Recent Labs     09/18/21  0405 09/19/21  0415 09/20/21  0416    141 141   K 3.2* 3.6 3.8    109 105   CO2 27 25 27   BUN 23* 22 18   CREATININE 0.9 0.9 0.8   GLUCOSE 105 110* 99     Calcium:  Recent Labs     09/20/21  0416   CALCIUM 8.8     Ionized Calcium:No results for input(s): IONCA in the last 72 hours. 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    Acute renal failure - Resolved.        Acute hypoxic respiratory failure Erik Eriwn. Ok with discharge plan. Discussed with Hospitalist. he doesn't need iv acyclovir.     Fransico Wetzel MD on 9/20/2021 at 2:18 PM

## 2021-09-20 NOTE — PROGRESS NOTES
[facility]  INPATIENT PHYSICAL THERAPY  DAILY NOTE  [employer] 4A - 4A-17/017-A  Time In: 5530  Time Out: 0828  Timed Code Treatment Minutes: 26 Minutes  Minutes: 26          Date: 2021  Patient Name: Zion Rome,  Gender:  male        MRN: [id number]  : 1940  ([de-identified] y.o.)  Referral Date : 21  Referring Practitioner: Erica Dockery DO  Diagnosis: Herpes zoster ophthalmicus  Additional Pertinent Hx: Per EMR:Presented to [facility] on 2021 with complaints of right eye swelling and pain. Noted to have some photophobia and a small rash. Started on IV acyclovir with concern for herpes zoster ophthalmicus. A rapid response called on  after patient became hypotensive with altered mental status. CT head obtained showed no acute changes. On , ASHLEY PERALTA was called with patient being subsequently intubated and transferred to the ICU. LP performed on  positive for varicella-zoster. Patient had emergent hemodialysis treatment on  out of concerns for acyclovir-induced neurotoxicity and DEANDRE. Patient also developed myoclonic jerking of his upper extremities and was started on Keppra for seizure activity. Extubated on . Prior Level of Function:  Lives With: Spouse  Type of Home: House  Home Layout: Two level, Able to Live on Main level with bedroom/bathroom  Home Access: Level entry  Home Equipment: Rolling walker, Electric scooter   Bathroom Shower/Tub: Walk-in shower  Bathroom Toilet: Standard  Bathroom Equipment: Shower chair, Grab bars around toilet, Grab bars in shower    ADL Assistance: [facility]: Independent  Homemaking Responsibilities: Yes  Ambulation Assistance: Independent  Transfer Assistance: Independent  Active : Yes  Additional Comments: Pt was indep PTA completing majority of IADLs d/t wife with medical concerns.  Pt ambulatory with walker, uses electric scooter within home    Restrictions/Precautions:  Restrictions/Precautions: Fall Risk  Position Activity Restriction  Other position/activity restrictions: No pregnant therapist    SUBJECTIVE: Ok to see pt per nursing. Pt in bed with breakfast untouched when therapy arrived, agreeable to PT session and to sit in chair for meal.     PAIN: denies    Vitals: Nurse checked vitals prior to session    OBJECTIVE:  Bed Mobility:  Supine to Sit: Minimal Assistance, X 1, with head of bed raised, with rail, with verbal cues , with increased time for completion  Required assist for trunk support to sit EOB and cues for proper technique of completion and for UE placement to help assist, with good demo. Pt required mod A for scooting to EOB to prepare for transfers. Transfers:  Sit to Stand: Minimal Assistance, Moderate Assistance, X 1, cues for hand placement, with verbal cues  Stand to Sit:Minimal Assistance, X 1, cues for hand placement, with verbal cues  Use of RW for support, cues for proper hand placement for safety to help assist, fair demo, completed from various surfaces and heights  Ambulation:  Minimal Assistance, Moderate Assistance, X 1, with cues for safety, with verbal cues , with increased time for completion  Distance: 2 feet to chair  Surface: Level Tile  Device:Rolling Walker  Gait Deviations: Forward Flexed Posture, Decreased Step Length Bilaterally, Decreased Weight Shift Bilaterally, Decreased Gait Speed, Decreased Heel Strike Bilaterally, Unsteady Gait, Decreased Terminal Knee Extension and reduced foot clearance, crouched gait pattern, max cues for improved upright posture, poor demo, cues for safety with completion, with increased assist for AD management to decrease falls. Pt required cues for proper sequencing, and for stepping back towards chair with good demo.      Balance:  Static Sitting Balance:  Contact Guard Assistance, X 1, with cues for safety, with verbal cues   Static Standing Balance: Minimal Assistance, X 1, with cues for safety, with verbal cues   Cues required for improved upright posture in standing, cues for improved hip extension to improve upright, fair demo, stance of at most 2 minutes during completion, increased FWD flexion as pt becomes fatigued. Pt required assist for daquan care as pt had a small bowel movement. Functional Outcome Measures: Completed  AM-PAC Inpatient Mobility Raw Score : 13  AM-PAC Inpatient T-Scale Score : 36.74    ASSESSMENT:  Assessment: Patient progressing toward established goals. Activity Tolerance:  Patient tolerance of  treatment: fair. weakness     Equipment Recommendations:Equipment Needed: No  Discharge Recommendations: Continue to assess pending progress, Subacute/Skilled Nursing Facility, Patient would benefit from continued therapy after discharge    Plan: Times per week: 5xN  Times per day: Daily  Current Treatment Recommendations: Strengthening, Transfer Training, Endurance Training, Neuromuscular Re-education, Patient/Caregiver Education & Training, Balance Training, Home Exercise Program, Functional Mobility Training, Safety Education & Training    Patient Education  Patient Education: Plan of Care, [employer] Mobility, Transfers, Gait, Use of Gait Belt,  - Patient Verbalized Understanding, - Patient Requires Continued Education    Goals:  Patient goals : go home  Short term goals  Time Frame for Short term goals: at discharge  Short term goal 1: Patient will complete supine < > sit with SBA to transfer in/out of bed with decreased difficulty. Short term goal 2: Patient will complete sit < > stand with CGA to stand to ambulate with decreased difficulty. Short term goal 3: Patient will tolerate 10 reps of seated exercises edge of bed to increase strength and mobility. Short term goal 4: Pt will amb with CGA for 50 feet with RW for support to progress with mobility.   Long term goals  Time Frame for Long term goals : N/A due to short estimated length of stay    Following session, patient left in safe position with all fall risk precautions in place. Pt left in bedside chair with all needs and call light in reach, chair alarm on, breakfast in front.

## 2021-09-20 NOTE — DISCHARGE INSTR - COC
Continuity of Care Form    Patient Name: Patti Bailey   :  1940  MRN:  [id number]    [facility] date:  2021  Discharge date: 2021      Code Status Order: Full Code   Advance Directives:      Admitting Physician:  No admitting provider for patient encounter. PCP: Steve Vera    Discharging Nurse: [facility] Unit/Room#: 4A-17/017-A  Discharging Unit Phone Number: [phone]    Emergency Contact:   Extended Emergency Contact Information  Primary Emergency Contact: Nancy Perea  Address: [facility]            [place], [place] [address] [address] Phone: [phone]  Mobile Phone: [phone]  Relation: Spouse  Secondary Emergency Contact: [address], [address] [address] Phone: [phone]  Relation: Child   needed? 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    Encephalopathy G93.40    DEANDRE (acute kidney injury) ([address] N17.9    ATN (acute tubular necrosis) ([facility]) N17.0    Hypokalemia E87.6       Isolation/Infection:   Isolation            Contact          Patient Infection Status       Infection Onset Added Last Indicated Last Indicated By Review Planned Expiration Resolved Resolved By    None active    Resolved    C-diff Rule Out 21 Clostridium Difficile Toxin/Antigen (Ordered)   21 Rule-Out Test Resulted            Nurse Assessment:  Last Vital Signs: BP (!) 137/98 Comment: manual blood pressure  Pulse 79   Temp 98.3 °F (36.8 °C) (Oral)   Resp 18   Ht 5' 7\" (1.702 m)   Wt 193 lb 8 oz (87.8 kg)   SpO2 98%   BMI 30.31 kg/m²     Last documented pain score (0-10 scale): Pain Level: 5  Last Weight:   Wt Readings from Last 1 Encounters:   09/20/21 193 lb 8 oz (87.8 kg)     Mental Status:  oriented and alert    IV Access:  - None    Nursing Mobility/ADLs:  Walking   Dependent  Transfer  Dependent  Bathing  Dependent  Dressing  Dependent  Toileting  Dependent  Feeding  Dependent  Med Admin  Dependent  Med Delivery   none    Wound Care Documentation and Therapy:        Elimination:  Continence:   · Bowel: Incontinent  · Bladder: Hampton catheter  Urinary Catheter: Insertion Date: 9/7/2021   Colostomy/Ileostomy/Ileal Conduit: No       Date of Last BM: 9/20/2021    Intake/Output Summary (Last 24 hours) at 9/20/2021 0959  Last data filed at 9/20/2021 0341  Gross per 24 hour   Intake 813 ml   Output 1800 ml   Net -987 ml     I/O last 3 completed shifts: In: 3173 [P.O.:680; I.V.:373]  Out: 1800 [Urine:1800]    Safety Concerns: At Risk for Falls    Impairments/Disabilities:      Vision    Nutrition Therapy:  Current Nutrition Therapy:   - Oral Diet:  General    Routes of Feeding: Oral  Liquids: Thin Liquids, no straws  Daily Fluid Restriction: no  Last Modified Barium Swallow with Video (Video Swallowing Test): not done    Treatments at the Time of Hospital Discharge:   Respiratory Treatments: na.  Patient is on room air. Oxygen Therapy:  is not on home oxygen therapy.   Ventilator:    - No ventilator support    Rehab Therapies: Physical Therapy, Occupational Therapy and Speech/Language Therapy  Weight Bearing Status/Restrictions: No weight bearing restirctions  Other Medical Equipment (for information only, NOT a DME order):  walker and bedside commode  Other Treatments: na      Patient's personal belongings (please select all that are sent with patient):  None    RN SIGNATURE:  Sandra Summers RN      CASE MANAGEMENT/SOCIAL WORK SECTION    Inpatient Status Date: 9/6/2021    Readmission Risk Assessment Score:  Readmission Risk              Risk of Unplanned Readmission:  22           Discharging to Facility/ Agency   · Name: [facility]   · [address]  · [phone]  · VIR:[phone]    Dialysis Facility (if applicable)   · Name:  · Address:  · Dialysis Schedule:  · Phone:  · Fax:    / signature: Electronically signed by JAYDEN Becerra on 9/20/21 at 10:01 AM EDT    PHYSICIAN SECTION    Prognosis: Good    Condition at Discharge: Stable    Rehab Potential (if transferring to Rehab): Good    Recommended Labs or Other Treatments After Discharge: Follow up with Ophthalmology and Neurology     Physician Certification: I certify the above information and transfer of Tao Ronquillo  is necessary for the continuing treatment of the diagnosis listed and that he requires [facility] for less 30 days.      Update Admission H&P: No change in H&P    PHYSICIAN SIGNATURE:  Electronically signed by An Weaver MD on 9/20/21 at 11:06 AM EDT

## 2021-09-20 NOTE — PROGRESS NOTES
[address]  Occupational Therapy  Daily Note  Time:   Time In: 4965  Time Out: 1124  Timed Code Treatment Minutes: 38 Minutes  Minutes: 38          Date: 2021  Patient Name: Deangelo Maradiaga,   Gender: male      Room: [facility]17/017-A  MRN: [id number]  : 1940  ([de-identified] y.o.)  Referring Practitioner: Christine Melendez DO  Diagnosis: Herpes Zoster Opthalmicus  Additional Pertinent Hx: Presented to [facility] on 2021 with complaints of right eye swelling and pain. Noted to have some photophobia and a small rash. Started on IV acyclovir with concern for herpes zoster ophthalmicus. A rapid response called on  after patient became hypotensive with altered mental status. CT head obtained showed no acute changes. On , ASHLEY PERALTA was called with patient being subsequently intubated and transferred to the ICU. LP performed on  positive for varicella-zoster. Patient had emergent hemodialysis treatment on  out of concerns for acyclovir-induced neurotoxicity and DEANDRE. Patient also developed myoclonic jerking of his upper extremities and was started on Keppra for seizure activity. Extubated on . Restrictions/Precautions:  Restrictions/Precautions: Fall Risk  Position Activity Restriction  Other position/activity restrictions: No pregnant therapist     SUBJECTIVE: Patient seated in bedside chair upon arrival; agreeable to therapy this date. Patient pleasant and cooperative. PAIN: 0/10:     Vitals: Vitals not assessed per clinical judgement, see nursing flowsheet    COGNITION: Slow Processing, Decreased Recall, Decreased Insight, Decreased Problem Solving, Decreased Safety Awareness, Difficulty Following Commands and Impulsive    ADL:   Toileting: Maximum Assistance, X 2, with verbal cues  and with increased time for completion.   rear periarea x 1 for standing x 1 for cleanliness  Toilet Transfer: Maximum Assistance, X 2, with verbal cues  and with increased time for completion. BSC. BALANCE:  Sitting Balance:  Stand By Assistance. Standing Balance: Maximal Assistance with cues for safety. with RW, unsteady    Patient identified one of their personal goals is to be able to sustain functional standing positions in order to complete various ADL and IADL skills in standing position, such as sinkside grooming or washing dishes. Dynamic standing task was then facilitated to challenge balance, strength, endurance. Patient required Max x 1, and demo'ed an endurance of trial 1: ~30 seconds prior to impulsive stand to sit transfer d/t decreased edurance trial 2: ~1 minute 30 seconds prior to seated RB. BED MOBILITY:  Not Tested    TRANSFERS:  Sit to Stand:  Maximum Assistance, X 1, with increased time for completion, cues for hand placement, with verbal cues, to/from chair with arms. increased time for transition of hands to RW  Stand to Sit: Maximum Assistance, X 2, with increased time for completion, cues for hand placement, with verbal cues, to/from chair with arms. Stand Pivot transfer: Maximal Assistance x 2 with verbal cues for direction to task. Increased time to complete    FUNCTIONAL MOBILITY:  Not appropriate at this time. ADDITIONAL ACTIVITIES:  Patient completed BUE strengthening exercises with skilled education on HEP: completed x15 reps x1 set with a minimal resistance band in all joints and all planes in order to improve UE strength and activity tolerance required for BADL routine and toilet / shower transfers. Patient tolerated good, requiring minimal rest breaks. Patient also required minimal verbal/visual cues for technique. ASSESSMENT:     Activity Tolerance:  Patient tolerance of  treatment: good. Discharge Recommendations: Continue to assess pending progress, IP Rehab, Patient would benefit from continued therapy after discharge   Equipment Recommendations:  Other: continue to assess pending discharge disposition  Plan: Times per week: 5x  Current Treatment Recommendations: Strengthening, Balance Training, Functional Mobility Training, Endurance Training, Equipment Evaluation, Education, & procurement, Home Management Training, Self-Care / ADL, Patient/Caregiver Education & Training, Safety Education & Training, Neuromuscular Re-education    Patient Education  Patient Education: Role of OT, Plan of Care, ADL's, IADL's, Home Exercise Program, Home Safety and Importance of Increasing Activity    Goals  Short term goals  Time Frame for Short term goals: by discharge  Short term goal 1: Pt will tolerate OTR assessment of mobility once able to maintain standing balance at Chas of 1 to increase indep with acessing environment. Short term goal 2: Pt will complete sit<>stand t/fs with Chas of 1 to increase indep with toilet hygiene. Short term goal 3: Pt will complete LB ADL with Chas of 1 to increase indep with self care. Short term goal 4: Pt will tolerate dynamic standing task with 1-2 hand release and Chas of 1 to increase balance required for grooming. Following session, patient left in safe position with all fall risk precautions in place.

## 2021-09-20 NOTE — PROGRESS NOTES
[facility] THERAPY MISSED TREATMENT NOTE  [facility] 4A      Date: 2021  Patient Name: Jessica Beasley        MRN: [id number]    : 1940  ([de-identified] y.o.)    REASON FOR MISSED TREATMENT: Per ST arrival, LEVY Watkins reported actively working on d/c, while waiting on negative COVID test to d/c to skilled nursing facility. ST to hold skills dysphagia and cognitive tx at this time. [place]  [employer]) Fabiana Lopes MA., CCC-SLP

## 2021-09-20 NOTE — DISCHARGE SUMMARY
Discharge Summary    Name:  Patti Bailey /Age/Sex: 1940  ([de-identified] y.o. male)   MRN & CSN:  [id number] & [id number] Admission Date/Time: 2021 11:26 AM   Attending:  Bear Aguilar MD Discharging Physician: Bear Aguilar MD     HPI:     Please, see admission HPI in [address] and patient's hospital course below    Hospital Course:   Patti Bailey is a [de-identified] y.o.  male with past medical history hypertension, BPH who presented with right eye swelling and admitted for herpes zoster ophthalmicus, developed encephalopathy likely from both herpes zoster, and toxicity of acyclovir and Keppra. He also developed respiratory failure with intubation and admission to ICU and extubated on  - patient required dialysis at some point due to toxicity of acyclovir - now back to his baseline and the following assessments below, reflect the patient's hospital course      Acute encephalopathy secondary to herpes zoster infection - resolved   Possible superimposed side effect of Keppra  - resolved      He is now at his baseline status.    MRI brain reviewed no acute findings. Continue supportive measures. Per neurology Vince Duboisger was discontinued and started on Depakote     Herpes Zoster ophthalmicus - V1 distribution - resolving      Vesicular rash right V1 distribution,    Has received 14 days of IV acyclovir - will DC with 1 more week of PO acyclovir per ID  Continue with prednisone eyedrops per ophthalmology recommendations.   Follow up with Ophthalmology as OP     Seizure disorder      Witnessed seizure activity while in hospital and EEG report suggestive  EEG  reported with periodic lateralized discharges and excessive slowing in the delta range  He was started on Keppra but discontinued due to agitation and aggression -likely side effects   Now on Depakote 750 bID   Follow up with neurology as OP    Group D Enterococcus UTI - completed treatment      Acute renal failure: Resolved     DC IV fluids, continue to monitor renal function avoid nephrotoxic medications.     Acute hypoxemic respiratory failure - resolved      Required mechanical ventilation but has since been weaned off of oxygen completely     Hypokalemia - resolved      Dysphagia: Speech eval, minced/moist and thin liquids     Hypertension: Continue lisinopril 10 mg daily monitor blood pressure    Patient is hemodynamically stable for DC to SNF    The patient expressed appropriate understanding of and agreement with the discharge recommendations, medications, and plan.      Consults this admission:  IP CONSULT TO SOCIAL WORK  IP CONSULT TO OPHTHALMOLOGY  IP CONSULT TO INFECTIOUS DISEASES  IP CONSULT TO NEUROLOGY  IP CONSULT TO [facility] EVAL    Discharge Instruction:   Follow up appointments: Neurology, Ophthalmology   Primary care physician:  within 1 to 2 weeks    Diet:  minced/moist and thin liquids   Activity: activity as tolerated  Disposition: Discharged to:   []Home, []HHC, [x]SNF, []Acute Rehab, []Hospice   Condition on discharge: Stable    Discharge Medications:      [place], [address] [facility] Medication Instructions [id number]    Printed on:09/20/21 9981   Medication Information                      ascorbic acid (VITAMIN C) 1000 MG tablet  Take 1,000 mg by mouth 4 times daily (before meals and nightly) For cholesterols             B Complex-Folic Acid (G-330 BALANCED TR PO)  Take 1 tablet by mouth 4 times daily (before meals and nightly) Natures Made B-100 time released             baclofen (LIORESAL) 10 MG tablet  Take 20 mg by mouth 3 times daily              calcium carbonate (OSCAL) 500 MG TABS tablet  Take 500 mg by mouth 2 times daily              Cinnamon 500 MG CAPS  Take 3 capsules by mouth 4 times daily Wolf with 2 ounces of half and half from [employer]             Cyanocobalamin (VITAMIN B 12 PO)  Take by mouth daily             DHEA 10 MG TABS  Take 50 mg by mouth daily              divalproex (DEPAKOTE) 250 MG DR tablet  Take 3 tablets by mouth every 12 hours             gabapentin (NEURONTIN) 300 MG capsule  Take 1 capsule by mouth 3 times daily as needed (pain) for up to 30 days.              Levomefolic Acid (5-MTHF PO)  Take 10 mg by mouth every morning (before breakfast) Metabolic Maintenance at [facility]             lisinopril (PRINIVIL;ZESTRIL) 20 MG tablet  Take 1 tablet by mouth daily             Lysine 500 MG TABS  Take 1 tablet by mouth 4 times daily (before meals and nightly) For cholesterols             Magnesium 400 MG CAPS  Take 1 capsule by mouth 4 times daily (before meals and nightly)              NONFORMULARY  Take 2 tablets by mouth daily LBS II             Omega-3 Fatty Acids (FISH OIL) 1000 MG CAPS  Take 3,000 mg by mouth 3 times daily              polyethylene glycol (GLYCOLAX) 17 g packet  Take 17 g by mouth daily             prednisoLONE acetate (PRED FORTE) 1 % ophthalmic suspension  Place 1 drop into the right eye 4 times daily             Proline 500 MG CAPS  Take 1 capsule by mouth 4 times daily (before meals and nightly) For cholesterols             Pyridoxine HCl (VITAMIN B-6) 100 MG tablet  Take 200 mg by mouth daily              senna (SENOKOT) 8.6 MG tablet  Take 1 tablet by mouth nightly             tamsulosin (FLOMAX) 0.4 MG capsule  Take 1 capsule by mouth daily             vitamin A 61952 UNITS capsule  Take 10,000 Units by mouth daily             vitamin D3 (CHOLECALCIFEROL) 400 units TABS tablet  Take 800 Units by mouth 2 times daily (before meals)              zinc gluconate 50 MG tablet  Take 50 mg by mouth daily                 Subjective _ Patient was resting on his recliner with no distress while on room air - denies any pain from the zoster lesions on the right eye and forehead - he feels to be back to his baseline     Objective Findings at Discharge:   BP (!) 137/98 Comment: manual blood pressure  Pulse 79   Temp 98.3 °F (36.8 °C) (Oral)   Resp 18   Ht 5' 7\" (1.702 m)   Wt 193 lb 8 oz (87.8 kg)   SpO2 98%   BMI 30.31 kg/m²            PHYSICAL EXAM   GEN Awake male, resting in bed in no apparent distress. Appears given age. RESP Clear to auscultation, no wheezes, rales or rhonchi. CARDIO/VAS - S1/S2 auscultated. Regular rate without appreciable murmurs, rubs, or gallops. Peripheral pulses equal bilaterally and palpable. No peripheral edema. GI Abdomen is soft without significant tenderness, masses, or guarding. Bowel sounds are normoactive  MSK No gross joint deformities. Spontaneous movement of all extremities  SKIN Normal coloration, warm, dry.   NEURO Right eye and forehead crusting lesions - no other focal neurologic signs     BMP/CBC  Recent Labs     09/18/21  0405 09/19/21  0415 09/20/21  0416    141 141   K 3.2* 3.6 3.8    109 105   CO2 27 25 27   BUN 23* 22 18   CREATININE 0.9 0.9 0.8   WBC 8.6 8.7 8.8   HCT 35.8* 35.6* 36.7*    325 329         Discharge Time of 33 minutes    Electronically signed by Low Seo MD on 9/20/2021 at 10:59 AM

## 2021-10-06 ENCOUNTER — INITIAL CONSULT (OUTPATIENT)
Dept: NEUROLOGY | Age: 81
End: 2021-10-06
Payer: MEDICARE

## 2021-10-06 VITALS
OXYGEN SATURATION: 96 % | HEART RATE: 86 BPM | HEIGHT: 67 IN | WEIGHT: 200 LBS | BODY MASS INDEX: 31.39 KG/M2 | SYSTOLIC BLOOD PRESSURE: 134 MMHG | DIASTOLIC BLOOD PRESSURE: 80 MMHG

## 2021-10-06 DIAGNOSIS — B01.11 VARICELLA ENCEPHALITIS: ICD-10-CM

## 2021-10-06 DIAGNOSIS — M89.9 DISORDER OF BONE, UNSPECIFIED: ICD-10-CM

## 2021-10-06 DIAGNOSIS — R56.9 SEIZURE (HCC): Primary | ICD-10-CM

## 2021-10-06 PROCEDURE — 99205 OFFICE O/P NEW HI 60 MIN: CPT | Performed by: PSYCHIATRY & NEUROLOGY

## 2021-10-06 RX ORDER — LANOLIN ALCOHOL/MO/W.PET/CERES
3 CREAM (GRAM) TOPICAL NIGHTLY PRN
COMMUNITY

## 2021-10-06 NOTE — PATIENT INSTRUCTIONS
1. Repeat MRI brain W/WO contrast  2. EEG  3. Depakote level  4. Continue with Depakote at current dose  5. Report any new seizure. 6. Continue following with speech, physical, and occupational therapy recommendations  7. No driving, swimming, operating heavy machinery or compromising heights until event free for 6 months. Report any new events. Call if any questions. 8. Vitamin B12, folate  9. Vitamin B6 level  10. Vitamin D level  11. Call with any new symptoms or concerns. 12. Follow up in  2 months.

## 2021-10-18 ENCOUNTER — TELEPHONE (OUTPATIENT)
Dept: NEUROLOGY | Age: 81
End: 2021-10-18

## 2021-10-18 NOTE — TELEPHONE ENCOUNTER
----- Message from BRONWYN Escoto CNP sent at 10/18/2021 12:15 PM EDT -----  Please let patient know his vitamin B6 level is low=4. 1. he needs to start on vitamin B6 supplements, 50 mg daily, over the counter  Please have him call the office to repeat vitamin B6 level in 2-3 months.   Renny Bolton CNP

## 2021-10-19 NOTE — PROGRESS NOTES
Apply to eye as needed      divalproex (DEPAKOTE) 250 MG DR tablet Take 3 tablets by mouth every 12 hours 90 tablet 3    polyethylene glycol (GLYCOLAX) 17 g packet Take 17 g by mouth daily 527 g 1    senna (SENOKOT) 8.6 MG tablet Take 1 tablet by mouth nightly 30 tablet 0    Cyanocobalamin (VITAMIN B 12 PO) Take by mouth daily      vitamin D3 (CHOLECALCIFEROL) 400 units TABS tablet Take 800 Units by mouth 2 times daily (before meals)       baclofen (LIORESAL) 10 MG tablet Take 20 mg by mouth 3 times daily       ascorbic acid (VITAMIN C) 1000 MG tablet Take 1,000 mg by mouth 4 times daily (before meals and nightly) For cholesterols      lisinopril (PRINIVIL;ZESTRIL) 20 MG tablet Take 1 tablet by mouth daily      tamsulosin (FLOMAX) 0.4 MG capsule Take 1 capsule by mouth daily      Magnesium 400 MG CAPS Take 1 capsule by mouth 4 times daily (before meals and nightly)       gabapentin (NEURONTIN) 300 MG capsule Take 1 capsule by mouth 3 times daily as needed (pain) for up to 30 days.  (Patient not taking: Reported on 10/6/2021) 90 capsule 3    prednisoLONE acetate (PRED FORTE) 1 % ophthalmic suspension Place 1 drop into the right eye 4 times daily (Patient not taking: Reported on 10/6/2021) 1 each 0    calcium carbonate (OSCAL) 500 MG TABS tablet Take 500 mg by mouth 2 times daily  (Patient not taking: Reported on 10/6/2021)      Omega-3 Fatty Acids (FISH OIL) 1000 MG CAPS Take 3,000 mg by mouth 3 times daily  (Patient not taking: Reported on 10/6/2021)      Lysine 500 MG TABS Take 1 tablet by mouth 4 times daily (before meals and nightly) For cholesterols (Patient not taking: Reported on 10/6/2021)      Proline 500 MG CAPS Take 1 capsule by mouth 4 times daily (before meals and nightly) For cholesterols (Patient not taking: Reported on 10/6/2021)      B Complex-Folic Acid (I-221 BALANCED TR PO) Take 1 tablet by mouth 4 times daily (before meals and nightly) Natures Made B-100 time released (Patient not taking: Reported on 10/6/2021)      DHEA 10 MG TABS Take 50 mg by mouth daily  (Patient not taking: Reported on 10/6/2021)      Cinnamon 500 MG CAPS Take 3 capsules by mouth 4 times daily Wolf with 2 ounces of half and half from [address] (Patient not taking: Reported on 9193)      Levomefolic Acid (5-MTHF PO) Take 10 mg by mouth every morning (before breakfast) Metabolic Maintenance at [facility] (Patient not taking: Reported on 10/6/2021)      Pyridoxine HCl (VITAMIN B-6) 100 MG tablet Take 200 mg by mouth daily  (Patient not taking: Reported on 10/6/2021)      NONFORMULARY Take 2 tablets by mouth daily LBS II (Patient not taking: Reported on 10/6/2021)      vitamin A 10641 UNITS capsule Take 10,000 Units by mouth daily (Patient not taking: Reported on 10/6/2021)      zinc gluconate 50 MG tablet Take 50 mg by mouth daily (Patient not taking: Reported on 10/6/2021)       No current facility-administered medications for this visit. Social History     Socioeconomic History    Marital status:      Spouse name: None    Number of children: None    Years of education: None    Highest education level: None   Occupational History    None   Tobacco Use    Smoking status: Former Smoker     Packs/day: 1.00     Years: 10.00     Pack years: 10.00     Types: Cigarettes     Quit date: 1970     Years since quittin.1    Smokeless tobacco: Never Used   Vaping Use    Vaping Use: Never used   Substance and Sexual Activity    Alcohol use: No    Drug use: No    Sexual activity: Not Currently     Partners: Female   Other Topics Concern    None   Social History Narrative    None     Social Determinants of Health     Financial Resource Strain:     Difficulty of Paying Living Expenses:    Food Insecurity:     Worried About Running Out of Food in the Last Year:     Ran Out of Food in the Last Year:    Transportation Needs:     Lack of Transportation (Medical):      Lack of Transportation (Non-Medical):    Physical Activity:     Days of Exercise per Week:     Minutes of Exercise per Session:    Stress:     Feeling of Stress :    Social Connections:     Frequency of Communication with Friends and Family:     Frequency of Social Gatherings with Friends and Family:     Attends Sikhism Services:     Active Member of Clubs or Organizations:     Attends Club or Organization Meetings:     Marital Status:    Intimate Partner Violence:     Fear of Current or Ex-Partner:     Emotionally Abused:     Physically Abused:     Sexually Abused:        Family History   Problem Relation Age of Onset    High Blood Pressure Mother     No Known Problems Father          I reviewed the past medical history, allergies, medications, social history and family history. Review of Systems   All systems reviewed, and are all negative, except what is mentioned in HPI      Vitals:    10/06/21 1028   BP: 134/80   Site: Left Upper Arm   Position: Sitting   Cuff Size: Medium Adult   Pulse: 86   SpO2: 96%   Weight: 200 lb (90.7 kg)   Height: 5' 7\" (1.702 m)       Physical Examination:  General appearance - alert, well appearing, and in no distress, oriented to person, place, and time and over weight, he is wearing a mask. Mental status- Level of Alertness: awake  Orientation: person, place, time  Memory: normal  Fund of Knowledge: normal  Attention/Concentration: normal  Language: normal. Mood is normal.   Neck - supple, no significant adenopathy, carotids upstroke normal bilaterally. There is no axillary lymphadenopathy. There is no carotid bruit. No neck lymphadenopathy.  No thyroid enlargement   Neurological -   Cranial Egwnfz-FT-YSP:.   Cranial nerve II:   Cranial nerve III: Pupils: equal, round, reactive to light, vision is hazy secondary to corneal irritation  Cranial nerves III, IV, VI: Extraocular Movements: intact   Cranial nerve V: Facial sensation: intact   Cranial nerve VII:Facial strength: intact Cranial nerve VIII: Hearing: intact   Cranial nerve IX: Palate Elevation intact bilaterally  Cranial nerve XI: Shoulder shrug intact bilaterally  Cranial nerve XII: Tongue midline   neck supple without rigidity, there is no limitation of range of motion of the neck. DTR's are brisk distal and symmetric  Babinski sign negative  Motor exam is 5/5 in the bilateral  upper and 4/5 in bilateral  lower extremities . Normal muscle tone. There is no muscle atrophy. Sensory is intact for light touch. Coordination: finger to nose, intact  Gait and station not tested, in wheelchair   Abnormal movement none, vibration reduced distally  Skin - warm, dry to touch, normal coloration, no rashes, no suspicious skin lesions  Superficial temporal artery pulses are normal.   There is no limitation of range of motion of the neck. There is no resting tremor, no pin rolling, no bradykinesia, no Hypohonia, normal blink rate. Musculoskeletal: Has no hand arthritis, no limitation of ROM in any of the four extremities. There is no leg edema. The Heart was regular in rate and rhythm. No heart murmur  Chest Clear, with  good effort. Abdomen soft, intact bowel sounds. MRI BRAIN WO CONTRAST  Narrative  PROCEDURE: MRI BRAIN WO CONTRAST  CLINICAL INFORMATION HCV. Unresponsive. HSV on the right forehead. Assess for HSV encephalitis. COMPARISON: Head CT 9/7/2021. TECHNIQUE: Multiplanar and multiple spin echo MRI images were obtained of the brain without contrast.  FINDINGS:  The diffusion-weighted images are normal.  The brain volume is mildly reduced. There is a mild amount of signal hyperintensity on the FLAIR and T2-weighted sequences in the white matter of the brain. This is consistent with mild severity chronic small  vessel ischemic changes. There is no abnormal signal in the temporal lobes. There is a small area of old infarct or volume loss in the periphery of the left cerebellum.   There are no intra-or extra-axial collections. There is no hydrocephalus, midline shift or mass effect. There is mineralization in the medial aspects of the basal ganglia bilaterally. The major intracranial vascular flow voids are present. The midline craniocervical junction structures are normal.  The pituitary gland and brainstem are normal.  There is some fullness of the nasal mucosa. There is some trace fluid in the left mastoid air cells. Impression  1. No acute findings. 2. Mild severity chronic small vessel ischemic changes. **This report has been created using voice recognition software. It may contain minor errors which are inherent in voice recognition technology. **    Final report electronically signed by Dr. Manpreet Hastings on 9/8/2021 11:48 AM      CT HEAD WO CONTRAST    Narrative  Exam: CT brain without contrast    Comparison: None    Clinical history: Acute mental status change    Findings:  The ventricles are normal in size and position. No intracranial masses or midline shift identified. No abnormal extra-axial fluid collections are seen. No acute intracranial hemorrhage. The visualized paranasal sinuses are clear. No skull fracture identified. Nonspecific tissue swelling at the right forehead and in the right malar  region. Impression  Impression:  1. No acute intracranial hemorrhage or process identified. 2. Nonspecific tissue swelling at the right forehead and in the right  malar region. This document has been electronically signed by: Elsi Youssef MD on  09/07/2021 11:02 PM    All CTs at this facility use dose modulation techniques and iterative  reconstructions, and/or weight-based dosing  when appropriate to reduce radiation to a low as reasonably achievable. EEG done 9/9/21:  IMPRESSION:  This is an abnormal EEG due to the presence of periodic  lateralized discharges that may be epileptogenic in nature, or indicate  seizure tendency in the proper clinical context.   In addition, excessive  slowing was seen in the delta range suggestive of a cortical dysfunction  such as seen with encephalopathy. Clinical correlation is recommended. No clinical seizures were observed. The patient is noted to be sedated  throughout the study. Kayla Cole MD  9/8/2021  2:36 PM - Pietro Singletary Incoming Lab Results From Soft    Specimen Information: CSF        Component Value Ref Range & Units Status Collected Lab   ESCHERICHIA COLI K1 CSF FILM ARRAY Not Detected  Not Detected Final 09/08/2021 12:15 PM [address] [address] CSF FILM ARRAY Not Detected  Not Detected Final 09/08/2021 12:15 PM [address] - [place] CSF FILM ARRAY Not Detected  Not Detected Final 09/08/2021 12:15 PM [address] [place] CSF FILM ARRAY Not Detected  Not Detected Final 09/08/2021 12:15 PM [address] - [address] [facility] CSF FILM ARRAY Not Detected  Not Detected Final 09/08/2021 12:15 PM [address] - [address] CSF FILM ARRAY Not Detected  Not Detected Final 09/08/2021 12:15 PM [facility] - [address] [facility] CSF FILM ARRAY Not Detected  Not Detected Final 09/08/2021 12:15 PM [address] Lab   Enterovirus Detect PCR Not Detected  Not Detected Final 09/08/2021 12:15 PM [facility] - [address] Lab   HSV-1 CSF FILM ARRAY Not Detected  Not Detected Final 09/08/2021 12:15 PM [facility] - [address] Lab   HSV-2 CSF FILM ARRAY Not Detected  Not Detected Final 09/08/2021 12:15 PM [facility] - [address] Lab   HHV-6 (HERPESVIRUS 6) CSF FILM ARRAY Not Detected  Not Detected Final 09/08/2021 12:15 PM [facility] - [address] Lab   PARECHOVIRUS CSF FILM ARRAY Not Detected  Not Detected Final 09/08/2021 12:15 PM [facility] - [address] Lab   Varicella-Zoster, PCR DetectedPanic   Not Detected Final 09/08/2021 12:15 PM [address] - [address] Lab   CRYPTOCOCCUS NEOFORMANS/CHUYITA CSF FILM ARR.  Not Detected  Not Detected Final 09/08/2021 12:15 PM [address] [place], [place] [address]ette [place] Lab   9/8/2021  1:22 PM - [facility], [employer] Incoming Lab Results From Soft    Component Value Ref Range & Units Status Collected Lab   Protein, CSF 25  12 - 60 mg/dl Final 09/08/2021 12:15 PM [address] - [address] Lab     9/8/2021  1:22 PM - [facility], [facility] Incoming Lab Results From Soft    Component Value Ref Range & Units Status Collected Lab   Glucose, CSF 61  40 - 80 mg/dl Final 09/08/2021 12:15 PM [address] - [address] Lab     9/8/2021  3:58 PM - [facility], [employer] Incoming Lab Results From Soft    Component Value Ref Range & Units Status Collected Lab   Color, CSF COLORLESS   Final 09/08/2021 12:15 PM [address] - [address] Lab   Character, CSF CLEAR   Final 09/08/2021 12:15 PM [address] - [address] Lab   Tube Volume Received CSF 15.0  ml Final 09/08/2021 12:15 PM [address] - [address] Lab   Total Nucleated Cells CSF 2  0 - 5 /cumm Final 09/08/2021 12:15 PM [facility] - [address] Lab   RBC, CSF 2  0/cumm /cumm Final 09/08/2021 12:15 PM [facility] - [address] Lab   Cell count performed on Tube 2   Lymphs, CSF 4  0 - 90 % Final 09/08/2021 12:15 PM [facility] - [address] Lab   Pathologist Lidya Ridley M.D. Final 09/08/2021 12:15 PM [address] [place] [address] Lab       9/19/2021  4:43 AM - [facility], [facility] Incoming Lab Results From Soft    Component Value Ref Range & Units Status Collected Lab   Valproic Acid Lvl 53.1  50.0 - 100.0 ug/mL Final 09/19/2021  4:15 AM [facility] Lab           We reviewed the patient records from referring provider and available information in the EHR       ASSESSMENT:      Diagnosis Orders   1. Seizure ([address]     2. 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 He underwent a lumbar puncture on 9/8/2021 that confirmed the varicella-zoster by PCR. I reviewed the MRI Brain and agree with interpretation, I also reviewed the patient pertinent labs and records in the EHR and from other providers.   MRI brain was done without contrast on 9/8/2021 and showed no concerning findings. Hospital GFR =10, he underwent diaylsis and he had nephrotoxic side effects of acyclovir, since stopping the Acyclovir  his kidney functions have normalized. He also underwent an EEG on 9/9/2021 that showed periodic lateralized discharges that may be epileptogenic in nature or indicate seizure tendency in the proper clinical context. He was started on Keppra, however due to kidney failure this was changed to Depakote. He is tolerating the medication without side effects. He will need a depakote level for therapeutic monitoring in addition to EEG to evaluate for seizure tendency. We will arrange for him to undergo repeat MRI brain with and without contrast as renal function, clearance has normalized. We will continue him on his current dose of Depakote. He was recommended to continue following with speech, physical, and occupational therapy recommendations. The patient in his daughter asked questions reflecting understanding and agree with the following plan. Plan    1. Repeat MRI brain W/WO contrast  2. EEG  3. Depakote level  4. Continue with Depakote at current dose  5. Report any new seizure. 6. Continue following with speech, physical, and occupational therapy recommendations  7. No driving, swimming, operating heavy machinery or compromising heights until event free for 6 months. Report any new events. Call if any questions. 8. Vitamin B12, folate  9. Vitamin B6 level  10. Vitamin D level  11. Call with any new symptoms or concerns. 12. Follow up in  2 months.      Total time 65 min      Jr Holden MD

## 2021-12-13 ENCOUNTER — HOSPITAL ENCOUNTER (OUTPATIENT)
Dept: MRI IMAGING | Age: 81
Discharge: HOME | End: 2021-12-13
Payer: MEDICARE

## 2021-12-13 ENCOUNTER — HOSPITAL ENCOUNTER (OUTPATIENT)
Dept: NEUROLOGY | Age: 81
Discharge: HOME | End: 2021-12-13
Payer: MEDICARE

## 2021-12-13 DIAGNOSIS — B01.11 VARICELLA ENCEPHALITIS: ICD-10-CM

## 2021-12-13 DIAGNOSIS — R56.9 SEIZURE (HCC): ICD-10-CM

## 2021-12-13 PROCEDURE — 70551 MRI BRAIN STEM W/O DYE: CPT

## 2021-12-13 PROCEDURE — 95819 EEG AWAKE AND ASLEEP: CPT

## 2021-12-13 NOTE — PROGRESS NOTES
[facility] Laboratory Technician worksheet       EEG Date: 2021    Name: Sonny Lewis   : 1940   Age: [de-identified] y.o. SEX: male    Room: OP    MRN: [id number]     CSN: [id number]    Ordering Provider: King Flor  EEG Number: [id number] Time of Test:  1310    Hand: Right   Sedation: No    H.V. Done: No age protocol Photic: No    Sleep: Yes   Drowsy: Yes   Sleep Deprived: No    Seizures observed: no    Mentality: alert       Clinical History:  Seizure. Varicella encephalitis. Patient developed rash above right eye followed by confusion and seizure activity. Previous EEG 21 while in ICU showed periodic lateralized discharges and excessive slowing in the delta range. Initially started on Keppra and acyclovir, but switched to Depakote due to kidney failure. Patient undergoing MRI Brain after EEG today. Past Medical History:       Diagnosis Date    Hypertension          Prior to Admission medications    Medication Sig Start Date End Date Taking? Authorizing Provider   melatonin 3 MG TABS tablet Take 3 mg by mouth nightly as needed    Historical Provider, MD   Carboxymethylcellulose Sodium (ARTIFICIAL TEARS OP) Apply to eye as needed    Historical Provider, MD   divalproex (DEPAKOTE) 250 MG DR tablet Take 3 tablets by mouth every 12 hours 21   Tim Avilez MD   gabapentin (NEURONTIN) 300 MG capsule Take 1 capsule by mouth 3 times daily as needed (pain) for up to 30 days.   Patient not taking: Reported on 10/6/2021 9/20/21 10/20/21  Tim Avilez MD   prednisoLONE acetate (PRED FORTE) 1 % ophthalmic suspension Place 1 drop into the right eye 4 times daily  Patient not taking: Reported on 10/6/2021 9/20/21   Tim Avilez MD   Cyanocobalamin (VITAMIN B 12 PO) Take by mouth daily    Historical Provider, MD   vitamin D3 (CHOLECALCIFEROL) 400 units TABS tablet Take 800 Units by mouth 2 times daily (before meals)     Historical Provider, MD   calcium carbonate (OSCAL) 500 MG TABS tablet Take 500 mg by mouth 2 times daily   Patient not taking: Reported on 10/6/2021    Historical Provider, MD   baclofen (LIORESAL) 10 MG tablet Take 20 mg by mouth 3 times daily  5/15/18   Historical Provider, MD   Omega-3 Fatty Acids (FISH OIL) 1000 MG CAPS Take 3,000 mg by mouth 3 times daily   Patient not taking: Reported on 10/6/2021    Historical Provider, MD   ascorbic acid (VITAMIN C) 1000 MG tablet Take 1,000 mg by mouth 4 times daily (before meals and nightly) For cholesterols    Historical Provider, MD   Lysine 500 MG TABS Take 1 tablet by mouth 4 times daily (before meals and nightly) For cholesterols  Patient not taking: Reported on 10/6/2021    Historical Provider, MD   Proline 500 MG CAPS Take 1 capsule by mouth 4 times daily (before meals and nightly) For cholesterols  Patient not taking: Reported on 10/6/2021    Historical ProviderMD VALDEZ Complex-Folic Acid (S-803 BALANCED TR PO) Take 1 tablet by mouth 4 times daily (before meals and nightly) Natures Made B-100 time released  Patient not taking: Reported on 10/6/2021    Historical Provider, MD   DHEA 10 MG TABS Take 50 mg by mouth daily   Patient not taking: Reported on 10/6/2021    Historical Provider, MD   Cinnamon 500 MG CAPS Take 3 capsules by mouth 4 times daily Wolf [facility]  Patient not taking: Reported on 10/6/2021    Historical Provider, MD   Levomefolic Acid (5-MTHF PO) Take 10 mg by mouth every morning (before breakfast) Metabolic Maintenance at [facility]  Patient not taking: Reported on 10/6/2021    Historical Provider, MD   lisinopril (PRINIVIL;ZESTRIL) 20 MG tablet Take 1 tablet by mouth daily 2/14/17   Historical Provider, MD   tamsulosin (FLOMAX) 0.4 MG capsule Take 1 capsule by mouth daily 12/6/16   Historical Provider, MD   Magnesium 400 MG CAPS Take 1 capsule by mouth 4 times daily (before meals and nightly)     Historical Provider, MD   Pyridoxine HCl (VITAMIN B-6) 100 MG

## 2021-12-14 PROCEDURE — 95819 EEG AWAKE AND ASLEEP: CPT | Performed by: PSYCHIATRY & NEUROLOGY

## 2021-12-14 NOTE — PROCEDURES
[address] [place] [address]                          ELECTROENCEPHALOGRAM REPORT    PATIENT NAME: Shelli Chery                      :        1940  MED REC NO:   [id number]                           ROOM:  ACCOUNT NO:   [de-identified]                           ADMIT DATE: 2021  PROVIDER:     Alli Mohan. MD Haja    DATE OF EE2021    REFERRING PROVIDER:  Erwin Mishra CNP    CLINICAL HISTORY:  An 64-year-old male presenting with seizure, history  of varicella encephalitis. On Depakote, Keppra, acyclovir, melatonin,  Neurontin, vitamin D, vitamin B12, Os-Renzo, Lioresal, vitamin C, lysine,  proline, DHEA, vitamin A, zinc.    CLINICAL INTERPRETATION:  This is a 17-channel EEG performed without  sleep deprivation. Hyperventilation was not performed. Photic  stimulation was not performed. The patient is described as alert. The background rhythm activity is noted to be 8 Hz in the posterior  parietal area, symmetric, attenuates with eye opening. Hyperventilation  was not performed. Lead artifact, muscle artifacts were noted limiting  quality of data obtained. Light stages of sleep are seen during the  recording. The patient is noted to be drowsy. There was no evidence of  epileptiform activity appreciated. IMPRESSION:  This is a normal awake and sleep EEG. There was no  evidence of epileptiform activity appreciated. Clinical correlation is  recommended.         Chantell Stover MD    D: 2021 8:55:03       T: 2021 8:57:31     [email or web address]  Job#: [id number]     Doc#: [id number]    CC:

## 2022-01-31 ENCOUNTER — OFFICE VISIT (OUTPATIENT)
Dept: NEUROLOGY | Age: 82
End: 2022-01-31
Payer: MEDICARE

## 2022-01-31 VITALS
HEART RATE: 76 BPM | BODY MASS INDEX: 29.66 KG/M2 | DIASTOLIC BLOOD PRESSURE: 88 MMHG | WEIGHT: 189 LBS | SYSTOLIC BLOOD PRESSURE: 120 MMHG | HEIGHT: 67 IN

## 2022-01-31 DIAGNOSIS — B01.11 VARICELLA ENCEPHALITIS: ICD-10-CM

## 2022-01-31 DIAGNOSIS — R56.9 SEIZURE (HCC): Primary | ICD-10-CM

## 2022-01-31 PROCEDURE — 99214 OFFICE O/P EST MOD 30 MIN: CPT | Performed by: PSYCHIATRY & NEUROLOGY

## 2022-01-31 NOTE — PATIENT INSTRUCTIONS
1. Continue with Depakote at current dose  2. Obtain CBC, Hepatic panel Depakote level 4/2022  3. Report any new seizure. 4. Continue following with speech, physical, and occupational therapy recommendations  5. No driving, swimming, operating heavy machinery or compromising heights until event free for 6 months. Report any new events. Call if any questions. 6. Take Vitamin B6 over the counter supplements. 7. Call with any new symptoms or concerns. 8. Follow up in 4 months.

## 2022-06-10 ENCOUNTER — OFFICE VISIT (OUTPATIENT)
Dept: NEUROLOGY | Age: 82
End: 2022-06-10
Payer: MEDICARE

## 2022-06-10 VITALS
OXYGEN SATURATION: 97 % | WEIGHT: 203 LBS | DIASTOLIC BLOOD PRESSURE: 88 MMHG | BODY MASS INDEX: 31.86 KG/M2 | RESPIRATION RATE: 16 BRPM | HEART RATE: 73 BPM | HEIGHT: 67 IN | SYSTOLIC BLOOD PRESSURE: 124 MMHG

## 2022-06-10 DIAGNOSIS — R56.9 SEIZURE (HCC): Primary | ICD-10-CM

## 2022-06-10 DIAGNOSIS — B01.11 VARICELLA ENCEPHALITIS: ICD-10-CM

## 2022-06-10 PROCEDURE — 99213 OFFICE O/P EST LOW 20 MIN: CPT | Performed by: NURSE PRACTITIONER

## 2022-06-10 PROCEDURE — 1123F ACP DISCUSS/DSCN MKR DOCD: CPT | Performed by: NURSE PRACTITIONER

## 2022-06-10 RX ORDER — DIVALPROEX SODIUM 250 MG/1
750 TABLET, DELAYED RELEASE ORAL EVERY 12 HOURS SCHEDULED
Qty: 180 TABLET | Refills: 5 | Status: SHIPPED | OUTPATIENT
Start: 2022-06-10

## 2022-06-10 RX ORDER — FERROUS SULFATE 325(65) MG
325 TABLET ORAL
COMMUNITY

## 2022-06-10 NOTE — PROGRESS NOTES
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 ROS:  Respiratory : no cough, no shortness of breath  Cardiac: no chest pain. No palpitations. Renal : no flank pain, no hematuria    Skin: no rash      No Known Allergies    Current Outpatient Medications:     ferrous sulfate (IRON 325) 325 (65 Fe) MG tablet, Take 325 mg by mouth daily (with breakfast), Disp: , Rfl:     melatonin 3 MG TABS tablet, Take 3 mg by mouth nightly as needed, Disp: , Rfl:     divalproex (DEPAKOTE) 250 MG DR tablet, Take 3 tablets by mouth every 12 hours, Disp: 90 tablet, Rfl: 3    Cyanocobalamin (VITAMIN B 12 PO), Take by mouth daily, Disp: , Rfl:     ascorbic acid (VITAMIN C) 1000 MG tablet, Take 1,000 mg by mouth 4 times daily (before meals and nightly) For cholesterols, Disp: , Rfl:     lisinopril (PRINIVIL;ZESTRIL) 20 MG tablet, Take 1 tablet by mouth daily, Disp: , Rfl:     magnesium 200 MG TABS tablet, Take 1 capsule by mouth 2 times daily (before meals) , Disp: , Rfl:     Pyridoxine HCl (VITAMIN B-6) 100 MG tablet, Take 200 mg by mouth daily , Disp: , Rfl:     Carboxymethylcellulose Sodium (ARTIFICIAL TEARS OP), Apply to eye as needed (Patient not taking: Reported on 1/31/2022), Disp: , Rfl:     gabapentin (NEURONTIN) 300 MG capsule, Take 1 capsule by mouth 3 times daily as needed (pain) for up to 30 days.  (Patient not taking: Reported on 10/6/2021), Disp: 90 capsule, Rfl: 3    vitamin D3 (CHOLECALCIFEROL) 400 units TABS tablet, Take 800 Units by mouth 2 times daily (before meals)  (Patient not taking: Reported on 6/10/2022), Disp: , Rfl:     calcium carbonate (OSCAL) 500 MG TABS tablet, Take 500 mg by mouth 2 times daily  (Patient not taking: Reported on 6/10/2022), Disp: , Rfl:     baclofen (LIORESAL) 10 MG tablet, Take 20 mg by mouth 3 times daily  (Patient not taking: Reported on 1/31/2022), Disp: , Rfl:     Omega-3 Fatty Acids (FISH OIL) 1000 MG CAPS, Take 3,000 mg by mouth 3 times daily  (Patient not taking: Reported on 6/10/2022), Disp: , Rfl:     Lysine 500 MG TABS, Take 1 tablet by mouth 4 times daily (before meals and nightly) For cholesterols  (Patient not taking: Reported on 6/10/2022), Disp: , Rfl:     B Complex-Folic Acid (I-852 BALANCED TR PO), Take 1 tablet by mouth 4 times daily (before meals and nightly) Natures Made B-100 time released (Patient not taking: Reported on 6/10/2022), Disp: , Rfl:     DHEA 10 MG TABS, Take 50 mg by mouth daily  (Patient not taking: Reported on 6/10/2022), Disp: , Rfl:     Cinnamon 500 MG CAPS, Take 3 capsules by mouth 4 times daily Wolf with 2 ounces of half and half from [address] (Patient not taking: Reported on 10/6/2021), Disp: , Rfl:     Levomefolic Acid (5-MTHF PO), Take 10 mg by mouth every morning (before breakfast) Metabolic Maintenance at [facility] (Patient not taking: Reported on 10/6/2021), Disp: , Rfl:     tamsulosin (FLOMAX) 0.4 MG capsule, Take 1 capsule by mouth daily (Patient not taking: Reported on 6/10/2022), Disp: , Rfl:     NONFORMULARY, Take 2 tablets by mouth daily LBS II  (Patient not taking: Reported on 6/10/2022), Disp: , Rfl:     zinc gluconate 50 MG tablet, Take 50 mg by mouth daily  (Patient not taking: Reported on 6/10/2022), Disp: , Rfl:     I reviewed the past medical history, allergies, medications, social history and family history. PE:   Vitals:    06/10/22 1036   BP: 124/88   Pulse: 73   Resp: 16   SpO2: 97%   Weight: 203 lb (92.1 kg)   Height: 5' 7\" (1.702 m)     General Appearance:  awake, alert, oriented, in no acute distress  Gen: NAD, Language is Intact. Skin: no rash, lesion, dry  to touch. warm  Head: no rash, no icterus  Neck: There is no carotid bruits. The Neck is supple. There is no neck lymphadenopathy. Neuro: CN 2-12 grossly intact with no focal deficits. Power 5/5 Throughout symmetric, Reflexes are  symmetric.  Long tracts are intact. Cerebellar exam is Intact. Sensory exam is intact to light touch. Gait is not tested he is in wheelchair  Musculoskeletal:  Has no hand arthritis, no limitation of ROM in any of the four extremities. Lower extremities no edema          DATA:      Results for orders placed or performed during the hospital encounter of 09/06/21   Culture, Blood 1    Specimen: Blood   Result Value Ref Range    Blood Culture, Routine No growth-preliminary No growth     Culture, Blood 2    Specimen: Blood   Result Value Ref Range    Blood Culture, Routine No growth-preliminary No growth     Culture, MRSA, Screening    Specimen: Rectum   Result Value Ref Range    MRSA SCREEN No MRSA isolated     VRE Screen by PCR    Specimen: Rectal Swab   Result Value Ref Range    Vancomycin Resistant Enterococcus NEGATIVE    Culture, Respiratory    Specimen: Esophageal Brush Specimen Quantitative Count   Result Value Ref Range    Respiratory Culture       Endotracheal tube cultures do not correlate well with chest x-ray results and are a poor predictor of true pulmonary infections. Normal live     Gram Stain Result       Few segmented neutrophils observed. Few epithelial cells observed. Few gram positive cocci in pairs and chains.     Culture, Urine    Specimen: Urine, catheter   Result Value Ref Range    Organism Enterococcus faecalis - (Group D) (A)     Urine Culture, Routine Nichols count: >100,000 CFU/mL         Susceptibility    Enterococcus faecalis - BACTERIAL SUSCEPTIBILITY PANEL BY [facility]RIS     Penicillin G 2 Sensitive mcg/mL     ampicillin <=2 Sensitive mcg/mL     nitrofurantoin <=16 Sensitive mcg/mL   Meningitis Encephalitis Panel CSF, Molecular    Specimen: CSF   Result Value Ref Range    ESCHERICHIA COLI K1 CSF FILM ARRAY Not Detected Not Detected    HAEMOPHILUS INFLUENZA CSF FILM ARRAY Not Detected Not Detected    LISTERIA MONOCYTOGENES CSF FILM ARRAY Not Detected Not Detected    NEISSERIA MENIGITIDIS CSF FILM ARRAY Not Detected Not Detected    STREPTOCOCCUS AGALACTIAE CSF FILM ARRAY Not Detected Not Detected    STREPTOCOCCUS PNEUMONIAE CSF FILM ARRAY Not Detected Not Detected    CYTOMEGALOVIRUS (CMV) CSF FILM ARRAY Not Detected Not Detected    Enterovirus Detect PCR Not Detected Not Detected    HSV-1 CSF FILM ARRAY Not Detected Not Detected    HSV-2 CSF FILM ARRAY Not Detected Not Detected    HHV-6 (HERPESVIRUS 6) CSF FILM ARRAY Not Detected Not Detected    PARECHOVIRUS CSF FILM ARRAY Not Detected Not Detected    Varicella-Zoster, PCR Detected (AA) Not Detected    CRYPTOCOCCUS NEOFORMANS/CHUYITA CSF FILM ARR. Not Detected Not Detected   Culture, Blood 2    Specimen: Blood   Result Value Ref Range    Blood Culture, Routine No growth-preliminary No growth     Clostridium Difficile Toxin/Antigen    Specimen: Stool   Result Value Ref Range    C.diff Toxin/Antigen NEGATIVE    Culture, Genital    Specimen: Penis Male Patient; Genital   Result Value Ref Range    Genital Culture, Routine (A)      Culture also yielded heavy growth of alpha hemolytic Streptococcus species, gram positive bacilli most consistent with Corynebacterium species, and very light growth of additional enteric gram negative bacilli. No GC isolated Current therapy ineffective in vitro for isolate. Gram Stain Result       Rare segmented neutrophils observed. Few epithelial cells observed. Many gram positive cocci occurring singly and in pairs. Many small gram positive bacilli. Organism Klebsiella aerogenes (A)     Genital Culture, Routine       light growth Klebsiella aerogenes which may be initially susceptible to third generation cephalosporins may develop resistance within three to four days of initiation of this antimicrobial therapy.         Susceptibility    Klebsiella aerogenes - BACTERIAL SUSCEPTIBILITY PANEL BY [facility]RIS     gentamicin <=1 Sensitive mcg/mL     cefepime <=1 Sensitive mcg/mL     ciprofloxacin <=0.25 Sensitive mcg/mL     trimethoprim-sulfamethoxazole <=20 Sensitive mcg/mL   COVID-19, Rapid    Specimen: Nasopharyngeal Swab   Result Value Ref Range    SARS-CoV-2, NAAT NOT DETECTED NOT DETECTED   CBC auto differential   Result Value Ref Range    WBC 6.7 4.8 - 10.8 thou/mm3    RBC 5.18 4.70 - 6.10 mill/mm3    Hemoglobin 15.3 14.0 - 18.0 gm/dl    Hematocrit 45.9 42.0 - 52.0 %    MCV 88.6 80.0 - 94.0 fL    MCH 29.5 26.0 - 33.0 pg    MCHC 33.3 32.2 - 35.5 gm/dl    RDW-CV 13.5 11.5 - 14.5 %    RDW-SD 44.1 35.0 - 45.0 fL    Platelets 339 944 - 608 thou/mm3    MPV 8.8 (L) 9.4 - 12.4 fL    Seg Neutrophils 75.1 %    Lymphocytes 12.2 %    Monocytes 9.9 %    Eosinophils 1.8 %    Basophils 0.4 %    Immature Granulocytes 0.6 %    Segs Absolute 5.0 1.8 - 7.7 thou/mm3    Lymphocytes Absolute 0.8 (L) 1.0 - 4.8 thou/mm3    Monocytes Absolute 0.7 0.4 - 1.3 thou/mm3    Eosinophils Absolute 0.1 0.0 - 0.4 thou/mm3    Basophils Absolute 0.0 0.0 - 0.1 thou/mm3    Immature Grans (Abs) 0.04 0.00 - 0.07 thou/mm3    nRBC 0 /100 wbc   Basic Metabolic Panel   Result Value Ref Range    Sodium 132 (L) 135 - 145 meq/L    Potassium 3.9 3.5 - 5.2 meq/L    Chloride 97 (L) 98 - 111 meq/L    CO2 23 23 - 33 meq/L    Glucose 109 (H) 70 - 108 mg/dL    BUN 13 7 - 22 mg/dL    CREATININE 0.7 0.4 - 1.2 mg/dL    Calcium 9.1 8.5 - 10.5 mg/dL   Anion Gap   Result Value Ref Range    Anion Gap 12.0 8.0 - 16.0 meq/L   Glomerular Filtration Rate, Estimated   Result Value Ref Range    Est, Glom Filt Rate >90 ml/min/1.73m2   Osmolality   Result Value Ref Range    Osmolality Calc 265.2 (L) 275.0 - 300.0 mOsmol/kg   Comprehensive Metabolic Panel w/ Reflex to MG   Result Value Ref Range    Glucose 110 (H) 70 - 108 mg/dL    CREATININE 2.3 (H) 0.4 - 1.2 mg/dL    BUN 23 (H) 7 - 22 mg/dL    Sodium 136 135 - 145 meq/L    Potassium reflex Magnesium 4.2 3.5 - 5.2 meq/L    Chloride 99 98 - 111 meq/L    CO2 25 23 - 33 meq/L    Calcium 9.0 8.5 - 10.5 mg/dL    AST 17 5 - 40 U/L    Alkaline Phosphatase 79 38 - 126 U/L    Total Protein 6.2 6.1 - 8.0 g/dL    Albumin 3.7 3.5 - 5.1 g/dL    Total Bilirubin 0.8 0.3 - 1.2 mg/dL    ALT 10 (L) 11 - 66 U/L   CBC auto differential   Result Value Ref Range    WBC 7.8 4.8 - 10.8 thou/mm3    RBC 4.84 4.70 - 6.10 mill/mm3    Hemoglobin 14.0 14.0 - 18.0 gm/dl    Hematocrit 43.7 42.0 - 52.0 %    MCV 90.3 80.0 - 94.0 fL    MCH 28.9 26.0 - 33.0 pg    MCHC 32.0 (L) 32.2 - 35.5 gm/dl    RDW-CV 14.0 11.5 - 14.5 %    RDW-SD 46.3 (H) 35.0 - 45.0 fL    Platelets 880 731 - 985 thou/mm3    MPV 9.3 (L) 9.4 - 12.4 fL    Seg Neutrophils 77.0 %    Lymphocytes 10.1 %    Monocytes 11.0 %    Eosinophils 1.0 %    Basophils 0.3 %    Immature Granulocytes 0.6 %    Segs Absolute 6.0 1.8 - 7.7 thou/mm3    Lymphocytes Absolute 0.8 (L) 1.0 - 4.8 thou/mm3    Monocytes Absolute 0.9 0.4 - 1.3 thou/mm3    Eosinophils Absolute 0.1 0.0 - 0.4 thou/mm3    Basophils Absolute 0.0 0.0 - 0.1 thou/mm3    Immature Grans (Abs) 0.05 0.00 - 0.07 thou/mm3    nRBC 0 /100 wbc   Anion Gap   Result Value Ref Range    Anion Gap 12.0 8.0 - 16.0 meq/L   Glomerular Filtration Rate, Estimated   Result Value Ref Range    Est, Glom Filt Rate 27 (A) ml/min/1.73m2   Lactic acid, plasma   Result Value Ref Range    Lactic Acid 0.9 0.5 - 2.0 mmol/L   Procalcitonin   Result Value Ref Range    Procalcitonin 0.80 (H) 0.01 - 0.09 ng/mL   Blood Gas, Arterial   Result Value Ref Range    pH, Blood Gas 7.43 7.35 - 7.45    PCO2 33 (L) 35 - 45 mmhg    PO2 79 71 - 104 mmhg    HCO3 22 (L) 23 - 28 mmol/l    Base Excess (Calculated) -2.0 -2.5 - 2.5 mmol/l    O2 Sat 96 %    DEVICE Room Air     Jason Test Positive     Source: R Radial     COLLECTED BY: [id number]    Ammonia   Result Value Ref Range    Ammonia 33 11 - 60 umol/L   CBC auto differential   Result Value Ref Range    WBC 6.4 4.8 - 10.8 thou/mm3    RBC 4.30 (L) 4.70 - 6.10 mill/mm3    Hemoglobin 12.3 (L) 14.0 - 18.0 gm/dl    Hematocrit 39.0 (L) 42.0 - 52.0 %    MCV 90.7 80.0 - 94.0 fL    MCH 28.6 26.0 - 33.0 pg    MCHC 31.5 (L) 32.2 - 35.5 gm/dl    RDW-CV 14.3 11.5 - 14.5 %    RDW-SD 47.2 (H) 35.0 - 45.0 fL    Platelets 020 596 - 251 thou/mm3    MPV 9.5 9.4 - 12.4 fL    Seg Neutrophils 67.3 %    Lymphocytes 17.9 %    Monocytes 10.9 %    Eosinophils 2.8 %    Basophils 0.5 %    Immature Granulocytes 0.6 %    Segs Absolute 4.3 1.8 - 7.7 thou/mm3    Lymphocytes Absolute 1.1 1.0 - 4.8 thou/mm3    Monocytes Absolute 0.7 0.4 - 1.3 thou/mm3    Eosinophils Absolute 0.2 0.0 - 0.4 thou/mm3    Basophils Absolute 0.0 0.0 - 0.1 thou/mm3    Immature Grans (Abs) 0.04 0.00 - 0.07 thou/mm3    nRBC 0 /100 wbc   Basic Metabolic Panel w/ Reflex to MG   Result Value Ref Range    Sodium 136 135 - 145 meq/L    Potassium reflex Magnesium 4.5 3.5 - 5.2 meq/L    Chloride 102 98 - 111 meq/L    CO2 19 (L) 23 - 33 meq/L    Glucose 99 70 - 108 mg/dL    BUN 40 (H) 7 - 22 mg/dL    CREATININE 5.1 (HH) 0.4 - 1.2 mg/dL    Calcium 8.3 (L) 8.5 - 10.5 mg/dL   Urinalysis with microscopic   Result Value Ref Range    Glucose, Urine NEGATIVE NEGATIVE mg/dl    Bilirubin Urine NEGATIVE NEGATIVE    Ketones, Urine NEGATIVE NEGATIVE    Specific Gravity, UA <1.005 1.002 - 1.030    Blood, Urine MODERATE (A) NEGATIVE    pH, UA 6.0 5.0 - 9.0    Protein, UA TRACE (A) NEGATIVE mg/dl    Urobilinogen, Urine 0.2 0.0 - 1.0 eu/dl    Nitrite, Urine NEGATIVE NEGATIVE    Leukocyte Esterase, Urine LARGE (A) NEGATIVE    Color, UA YELLOW YELLOW-STRAW    Character, Urine CLOUDY (A) [id number]    RBC, UA 3-5 0-2/hpf /hpf    WBC, UA > 200 0-4/hpf /hpf    Epithelial Cells, UA NONE SEEN 3-5/hpf /hpf    Bacteria, UA FEW FEW/NONE SEEN    Casts NONE SEEN NONE SEEN /lpf    Crystals NONE SEEN NONE SEEN    Renal Epithelial, UA NONE SEEN NONE SEEN    Yeast, UA NONE SEEN NONE SEEN    Casts NONE SEEN /lpf    Miscellaneous Lab Test Result NONE SEEN    MRSA by PCR   Result Value Ref Range    MRSA SCREEN RT-PCR NEGATIVE    Anion Gap   Result Value Ref Range    Anion Gap 15.0 8.0 - 16.0 meq/L   Glomerular Filtration Rate, Estimated   Result Value Ref Range    EstCecilia Filt Rate 11 (A) ml/min/1.73m2   Microalbumin / Creatinine Urine Ratio   Result Value Ref Range    Microalbumin, Random Urine 6.29 mg/dL    Creatinine, Urine 29.1 mg/dL    Microalb/Creat Ratio 216 (H) 0 - 30 mg/g   Creatinine, Random Urine   Result Value Ref Range    Creatinine, Urine 29.1 mg/dl   Sodium, urine, random   Result Value Ref Range    Sodium, Ur 30 meq/l   Glucose, CSF   Result Value Ref Range    Glucose, CSF 61 40 - 80 mg/dl   Protein, CSF   Result Value Ref Range    Protein, CSF 25 12 - 60 mg/dl   CSF Protein + Glucose   Result Value Ref Range    Protein, CSF 25 12 - 60 mg/dl    Glucose, CSF 60 40 - 80 mg/dl   CSF Cell Count with Differential   Result Value Ref Range    Color, CSF COLORLESS     Character, CSF CLEAR     Tube Volume Received CSF 15.0 ml    Total Nucleated Cells CSF 2 0 - 5 /cumm    RBC, CSF 2 0/cumm /cumm    Lymphs, CSF 4 0 - 90 %    Pathologist Jun Lema M.D.     Eosinophil smear urine   Result Value Ref Range    Eosinophil Smear No Eos Seen No Eos Seen    Specimen Urine    CBC auto differential   Result Value Ref Range    WBC 8.5 4.8 - 10.8 thou/mm3    RBC 4.70 4.70 - 6.10 mill/mm3    Hemoglobin 13.6 (L) 14.0 - 18.0 gm/dl    Hematocrit 43.1 42.0 - 52.0 %    MCV 91.7 80.0 - 94.0 fL    MCH 28.9 26.0 - 33.0 pg    MCHC 31.6 (L) 32.2 - 35.5 gm/dl    RDW-CV 14.4 11.5 - 14.5 %    RDW-SD 48.4 (H) 35.0 - 45.0 fL    Platelets 090 296 - 095 thou/mm3    MPV 9.2 (L) 9.4 - 12.4 fL    Seg Neutrophils 75.3 %    Lymphocytes 10.7 %    Monocytes 12.1 %    Eosinophils 1.2 %    Basophils 0.2 %    Immature Granulocytes 0.5 %    Segs Absolute 6.4 1.8 - 7.7 thou/mm3    Lymphocytes Absolute 0.9 (L) 1.0 - 4.8 thou/mm3    Monocytes Absolute 1.0 0.4 - 1.3 thou/mm3    Eosinophils Absolute 0.1 0.0 - 0.4 thou/mm3    Basophils Absolute 0.0 0.0 - 0.1 thou/mm3    Immature Grans (Abs) 0.04 0.00 - 0.07 thou/mm3    nRBC 0 /100 wbc   Basic Metabolic Panel w/ Reflex to MG   Result Value Ref Range    Sodium 140 135 - 145 meq/L    Potassium reflex Magnesium 4.7 3.5 - 5.2 meq/L    Chloride 109 98 - 111 meq/L    CO2 19 (L) 23 - 33 meq/L    Glucose 106 70 - 108 mg/dL    BUN 41 (H) 7 - 22 mg/dL    CREATININE 5.5 (HH) 0.4 - 1.2 mg/dL    Calcium 8.1 (L) 8.5 - 10.5 mg/dL   SPECIMEN REJECTION   Result Value Ref Range    Rejected Test bmp     Reason for Rejection see below    Basic Metabolic Panel   Result Value Ref Range    Sodium 138 135 - 145 meq/L    Potassium 4.2 3.5 - 5.2 meq/L    Chloride 105 98 - 111 meq/L    CO2 21 (L) 23 - 33 meq/L    Glucose 100 70 - 108 mg/dL    BUN 42 (H) 7 - 22 mg/dL    CREATININE 5.2 (HH) 0.4 - 1.2 mg/dL    Calcium 8.4 (L) 8.5 - 10.5 mg/dL   Anion Gap   Result Value Ref Range    Anion Gap 12.0 8.0 - 16.0 meq/L   Glomerular Filtration Rate, Estimated   Result Value Ref Range    Est, Glom Filt Rate 11 (A) ml/min/1.73m2   Magnesium   Result Value Ref Range    Magnesium 2.5 (H) 1.6 - 2.4 mg/dL   Prolactin   Result Value Ref Range    Prolactin 26.8 ng/ml   Calcium, Ionized   Result Value Ref Range    Calcium, Ion 1.06 (L) 1.12 - 1.32 mmol/L   Lactic acid, plasma   Result Value Ref Range    Lactic Acid 1.1 0.5 - 2.0 mmol/L   Phosphorus   Result Value Ref Range    Phosphorus 5.6 (H) 2.4 - 4.7 mg/dL   TSH with Reflex   Result Value Ref Range    TSH 0.792 0.400 - 4.200 uIU/mL   Anion Gap   Result Value Ref Range    Anion Gap 12.0 8.0 - 16.0 meq/L   Glomerular Filtration Rate, Estimated   Result Value Ref Range    Est, Glom Filt Rate 10 (A) ml/min/1.73m2   Hepatitis B surface antigen   Result Value Ref Range    Hepatitis B Surface Ag Negative    Hepatitis B surface antibody   Result Value Ref Range    Hep B S Ab Negative    Hepatitis B core antibody, IgM   Result Value Ref Range    Hep B Core Ab, IgM Negative    Basic Metabolic Panel w/ Reflex to MG   Result Value Ref Range    Sodium 147 (H) 135 - 145 meq/L    Potassium reflex Magnesium 3.8 3.5 - 5.2 meq/L Chloride 113 (H) 98 - 111 meq/L    CO2 22 (L) 23 - 33 meq/L    Glucose 119 (H) 70 - 108 mg/dL    BUN 24 (H) 7 - 22 mg/dL    CREATININE 2.9 (H) 0.4 - 1.2 mg/dL    Calcium 9.1 8.5 - 10.5 mg/dL   CBC Auto Differential   Result Value Ref Range    WBC 8.3 4.8 - 10.8 thou/mm3    RBC 4.64 (L) 4.70 - 6.10 mill/mm3    Hemoglobin 13.7 (L) 14.0 - 18.0 gm/dl    Hematocrit 41.5 (L) 42.0 - 52.0 %    MCV 89.4 80.0 - 94.0 fL    MCH 29.5 26.0 - 33.0 pg    MCHC 33.0 32.2 - 35.5 gm/dl    RDW-CV 14.6 (H) 11.5 - 14.5 %    RDW-SD 47.3 (H) 35.0 - 45.0 fL    Platelets 824 140 - 602 thou/mm3    MPV 8.9 (L) 9.4 - 12.4 fL    Seg Neutrophils 78.2 %    Lymphocytes 10.3 %    Monocytes 8.6 %    Eosinophils 1.9 %    Basophils 0.4 %    Immature Granulocytes 0.6 %    Segs Absolute 6.5 1.8 - 7.7 thou/mm3    Lymphocytes Absolute 0.9 (L) 1.0 - 4.8 thou/mm3    Monocytes Absolute 0.7 0.4 - 1.3 thou/mm3    Eosinophils Absolute 0.2 0.0 - 0.4 thou/mm3    Basophils Absolute 0.0 0.0 - 0.1 thou/mm3    Immature Grans (Abs) 0.05 0.00 - 0.07 thou/mm3    nRBC 0 /100 wbc   Anion Gap   Result Value Ref Range    Anion Gap 12.0 8.0 - 16.0 meq/L   Glomerular Filtration Rate, Estimated   Result Value Ref Range    Est, Glom Filt Rate 21 (A) DF/DAA/9.79L2   Basic Metabolic Panel w/ Reflex to MG   Result Value Ref Range    Sodium 140 135 - 145 meq/L    Potassium reflex Magnesium 3.2 (L) 3.5 - 5.2 meq/L    Chloride 105 98 - 111 meq/L    CO2 24 23 - 33 meq/L    Glucose 161 (H) 70 - 108 mg/dL    BUN 19 7 - 22 mg/dL    CREATININE 1.9 (H) 0.4 - 1.2 mg/dL    Calcium 9.3 8.5 - 10.5 mg/dL   CBC auto differential   Result Value Ref Range    WBC 9.7 4.8 - 10.8 thou/mm3    RBC 4.82 4.70 - 6.10 mill/mm3    Hemoglobin 13.9 (L) 14.0 - 18.0 gm/dl    Hematocrit 42.9 42.0 - 52.0 %    MCV 89.0 80.0 - 94.0 fL    MCH 28.8 26.0 - 33.0 pg    MCHC 32.4 32.2 - 35.5 gm/dl    RDW-CV 14.3 11.5 - 14.5 %    RDW-SD 46.2 (H) 35.0 - 45.0 fL    Platelets 227 870 - 941 thou/mm3    MPV 8.8 (L) 9.4 - 12.4 fL Seg Neutrophils 78.6 %    Lymphocytes 10.6 %    Monocytes 9.2 %    Eosinophils 0.9 %    Basophils 0.3 %    Immature Granulocytes 0.4 %    Segs Absolute 7.6 1.8 - 7.7 thou/mm3    Lymphocytes Absolute 1.0 1.0 - 4.8 thou/mm3    Monocytes Absolute 0.9 0.4 - 1.3 thou/mm3    Eosinophils Absolute 0.1 0.0 - 0.4 thou/mm3    Basophils Absolute 0.0 0.0 - 0.1 thou/mm3    Immature Grans (Abs) 0.04 0.00 - 0.07 thou/mm3    nRBC 0 /100 wbc   Phosphorus   Result Value Ref Range    Phosphorus 3.1 2.4 - 4.7 mg/dL   Basic Metabolic Panel   Result Value Ref Range    Sodium 145 135 - 145 meq/L    Potassium 3.9 3.5 - 5.2 meq/L    Chloride 110 98 - 111 meq/L    CO2 21 (L) 23 - 33 meq/L    Glucose 144 (H) 70 - 108 mg/dL    BUN 22 7 - 22 mg/dL    CREATININE 2.3 (H) 0.4 - 1.2 mg/dL    Calcium 9.3 8.5 - 10.5 mg/dL   Anion Gap   Result Value Ref Range    Anion Gap 14.0 8.0 - 16.0 meq/L   Glomerular Filtration Rate, Estimated   Result Value Ref Range    Est, Glom Filt Rate 27 (A) ml/min/1.73m2   Anion Gap   Result Value Ref Range    Anion Gap 11.0 8.0 - 16.0 meq/L   Magnesium   Result Value Ref Range    Magnesium 2.0 1.6 - 2.4 mg/dL   Glomerular Filtration Rate, Estimated   Result Value Ref Range    Est, Glom Filt Rate 34 (A) ml/min/1.73m2   Potassium   Result Value Ref Range    Potassium 3.7 3.5 - 5.2 meq/L   Sodium   Result Value Ref Range    Sodium 139 135 - 145 meq/L   Basic Metabolic Panel w/ Reflex to MG   Result Value Ref Range    Sodium 134 (L) 135 - 145 meq/L    Potassium reflex Magnesium 3.3 (L) 3.5 - 5.2 meq/L    Chloride 98 98 - 111 meq/L    CO2 24 23 - 33 meq/L    Glucose 127 (H) 70 - 108 mg/dL    BUN 18 7 - 22 mg/dL    CREATININE 1.4 (H) 0.4 - 1.2 mg/dL    Calcium 8.9 8.5 - 10.5 mg/dL   CBC auto differential   Result Value Ref Range    WBC 12.3 (H) 4.8 - 10.8 thou/mm3    RBC 4.52 (L) 4.70 - 6.10 mill/mm3    Hemoglobin 13.2 (L) 14.0 - 18.0 gm/dl    Hematocrit 40.1 (L) 42.0 - 52.0 %    MCV 88.7 80.0 - 94.0 fL    MCH 29.2 26.0 - 33.0 pg    MCHC 32.9 32.2 - 35.5 gm/dl    RDW-CV 14.1 11.5 - 14.5 %    RDW-SD 45.3 (H) 35.0 - 45.0 fL    Platelets 592 643 - 405 thou/mm3    MPV 8.8 (L) 9.4 - 12.4 fL    Seg Neutrophils 77.0 %    Lymphocytes 12.6 %    Monocytes 7.2 %    Eosinophils 1.9 %    Basophils 0.3 %    Immature Granulocytes 1.0 %    Segs Absolute 9.5 (H) 1.8 - 7.7 thou/mm3    Lymphocytes Absolute 1.5 1.0 - 4.8 thou/mm3    Monocytes Absolute 0.9 0.4 - 1.3 thou/mm3    Eosinophils Absolute 0.2 0.0 - 0.4 thou/mm3    Basophils Absolute 0.0 0.0 - 0.1 thou/mm3    Immature Grans (Abs) 0.12 (H) 0.00 - 0.07 thou/mm3    nRBC 0 /100 wbc   Anion Gap   Result Value Ref Range    Anion Gap 12.0 8.0 - 16.0 meq/L   Magnesium   Result Value Ref Range    Magnesium 1.9 1.6 - 2.4 mg/dL   Glomerular Filtration Rate, Estimated   Result Value Ref Range    Est, Glom Filt Rate 49 (A) ml/min/1.73m2   Potassium   Result Value Ref Range    Potassium 3.6 3.5 - 5.2 meq/L   Basic Metabolic Panel w/ Reflex to MG   Result Value Ref Range    Sodium 135 135 - 145 meq/L    Potassium reflex Magnesium 3.7 3.5 - 5.2 meq/L    Chloride 99 98 - 111 meq/L    CO2 25 23 - 33 meq/L    Glucose 113 (H) 70 - 108 mg/dL    BUN 19 7 - 22 mg/dL    CREATININE 0.9 0.4 - 1.2 mg/dL    Calcium 9.1 8.5 - 10.5 mg/dL   CBC auto differential   Result Value Ref Range    WBC 10.4 4.8 - 10.8 thou/mm3    RBC 4.57 (L) 4.70 - 6.10 mill/mm3    Hemoglobin 13.2 (L) 14.0 - 18.0 gm/dl    Hematocrit 40.9 (L) 42.0 - 52.0 %    MCV 89.5 80.0 - 94.0 fL    MCH 28.9 26.0 - 33.0 pg    MCHC 32.3 32.2 - 35.5 gm/dl    RDW-CV 13.8 11.5 - 14.5 %    RDW-SD 44.6 35.0 - 45.0 fL    Platelets 176 316 - 072 thou/mm3    MPV 9.1 (L) 9.4 - 12.4 fL    Seg Neutrophils 71.6 %    Lymphocytes 14.5 %    Monocytes 9.3 %    Eosinophils 2.5 %    Basophils 0.7 %    Immature Granulocytes 1.4 %    Segs Absolute 7.4 1.8 - 7.7 thou/mm3    Lymphocytes Absolute 1.5 1.0 - 4.8 thou/mm3    Monocytes Absolute 1.0 0.4 - 1.3 thou/mm3    Eosinophils Absolute 0.3 0.0 - 0.4 thou/mm3    Basophils Absolute 0.1 0.0 - 0.1 thou/mm3    Immature Grans (Abs) 0.15 (H) 0.00 - 0.07 thou/mm3    nRBC 0 /100 wbc   Anion Gap   Result Value Ref Range    Anion Gap 11.0 8.0 - 16.0 meq/L   Glomerular Filtration Rate, Estimated   Result Value Ref Range    Est, Glom Filt Rate 81 (A) VK/LYW/7.05G5   Basic Metabolic Panel w/ Reflex to MG   Result Value Ref Range    Sodium 140 135 - 145 meq/L    Potassium reflex Magnesium 3.3 (L) 3.5 - 5.2 meq/L    Chloride 102 98 - 111 meq/L    CO2 25 23 - 33 meq/L    Glucose 96 70 - 108 mg/dL    BUN 21 7 - 22 mg/dL    CREATININE 1.1 0.4 - 1.2 mg/dL    Calcium 9.1 8.5 - 10.5 mg/dL   CBC auto differential   Result Value Ref Range    WBC 9.7 4.8 - 10.8 thou/mm3    RBC 4.62 (L) 4.70 - 6.10 mill/mm3    Hemoglobin 13.8 (L) 14.0 - 18.0 gm/dl    Hematocrit 41.1 (L) 42.0 - 52.0 %    MCV 89.0 80.0 - 94.0 fL    MCH 29.9 26.0 - 33.0 pg    MCHC 33.6 32.2 - 35.5 gm/dl    RDW-CV 14.0 11.5 - 14.5 %    RDW-SD 44.8 35.0 - 45.0 fL    Platelets 096 692 - 939 thou/mm3    MPV 8.9 (L) 9.4 - 12.4 fL    Seg Neutrophils 71.1 %    Lymphocytes 13.9 %    Monocytes 9.8 %    Eosinophils 3.5 %    Basophils 0.5 %    Immature Granulocytes 1.2 %    Segs Absolute 6.9 1.8 - 7.7 thou/mm3    Lymphocytes Absolute 1.3 1.0 - 4.8 thou/mm3    Monocytes Absolute 1.0 0.4 - 1.3 thou/mm3    Eosinophils Absolute 0.3 0.0 - 0.4 thou/mm3    Basophils Absolute 0.0 0.0 - 0.1 thou/mm3    Immature Grans (Abs) 0.12 (H) 0.00 - 0.07 thou/mm3    nRBC 0 /100 wbc   Anion Gap   Result Value Ref Range    Anion Gap 13.0 8.0 - 16.0 meq/L   Glomerular Filtration Rate, Estimated   Result Value Ref Range    Est, Glom Filt Rate 64 (A) ml/min/1.73m2   Magnesium   Result Value Ref Range    Magnesium 1.8 1.6 - 2.4 mg/dL   Basic Metabolic Panel w/ Reflex to MG   Result Value Ref Range    Sodium 140 135 - 145 meq/L    Potassium reflex Magnesium 3.3 (L) 3.5 - 5.2 meq/L    Chloride 104 98 - 111 meq/L    CO2 23 23 - 33 meq/L    Glucose 121 (H) 70 - 108 mg/dL    BUN 23 (H) 7 - 22 mg/dL    CREATININE 1.1 0.4 - 1.2 mg/dL    Calcium 9.3 8.5 - 10.5 mg/dL   CBC auto differential   Result Value Ref Range    WBC 11.6 (H) 4.8 - 10.8 thou/mm3    RBC 4.33 (L) 4.70 - 6.10 mill/mm3    Hemoglobin 12.5 (L) 14.0 - 18.0 gm/dl    Hematocrit 39.6 (L) 42.0 - 52.0 %    MCV 91.5 80.0 - 94.0 fL    MCH 28.9 26.0 - 33.0 pg    MCHC 31.6 (L) 32.2 - 35.5 gm/dl    RDW-CV 13.8 11.5 - 14.5 %    RDW-SD 46.3 (H) 35.0 - 45.0 fL    Platelets 641 545 - 341 thou/mm3    MPV 8.8 (L) 9.4 - 12.4 fL    Seg Neutrophils 75.3 %    Lymphocytes 11.6 %    Monocytes 10.5 %    Eosinophils 1.1 %    Basophils 0.3 %    Immature Granulocytes 1.2 %    Segs Absolute 8.7 (H) 1.8 - 7.7 thou/mm3    Lymphocytes Absolute 1.3 1.0 - 4.8 thou/mm3    Monocytes Absolute 1.2 0.4 - 1.3 thou/mm3    Eosinophils Absolute 0.1 0.0 - 0.4 thou/mm3    Basophils Absolute 0.0 0.0 - 0.1 thou/mm3    Immature Grans (Abs) 0.14 (H) 0.00 - 0.07 thou/mm3    nRBC 0 /100 wbc   Basic Metabolic Panel   Result Value Ref Range    Sodium 140 135 - 145 meq/L    Potassium 3.3 (L) 3.5 - 5.2 meq/L    Chloride 105 98 - 111 meq/L    CO2 25 23 - 33 meq/L    Glucose 118 (H) 70 - 108 mg/dL    BUN 23 (H) 7 - 22 mg/dL    CREATININE 1.0 0.4 - 1.2 mg/dL    Calcium 8.7 8.5 - 10.5 mg/dL   Anion Gap   Result Value Ref Range    Anion Gap 10.0 8.0 - 16.0 meq/L   Glomerular Filtration Rate, Estimated   Result Value Ref Range    Est, Glom Filt Rate 72 (A) ml/min/1.73m2   Magnesium   Result Value Ref Range    Magnesium 1.8 1.6 - 2.4 mg/dL   Anion Gap   Result Value Ref Range    Anion Gap 13.0 8.0 - 16.0 meq/L   Glomerular Filtration Rate, Estimated   Result Value Ref Range    Est, Glom Filt Rate 64 (A) FT/CFQ/9.61B6   Basic Metabolic Panel w/ Reflex to MG   Result Value Ref Range    Sodium 145 135 - 145 meq/L    Potassium reflex Magnesium 3.1 (L) 3.5 - 5.2 meq/L    Chloride 107 98 - 111 meq/L    CO2 25 23 - 33 meq/L    Glucose 100 70 - 108 mg/dL    BUN 23 (H) 7 - 22 mg/dL    CREATININE 0.9 0.4 - 1.2 mg/dL    Calcium 9.4 8.5 - 10.5 mg/dL   CBC auto differential   Result Value Ref Range    WBC 9.2 4.8 - 10.8 thou/mm3    RBC 4.27 (L) 4.70 - 6.10 mill/mm3    Hemoglobin 12.2 (L) 14.0 - 18.0 gm/dl    Hematocrit 39.0 (L) 42.0 - 52.0 %    MCV 91.3 80.0 - 94.0 fL    MCH 28.6 26.0 - 33.0 pg    MCHC 31.3 (L) 32.2 - 35.5 gm/dl    RDW-CV 13.8 11.5 - 14.5 %    RDW-SD 45.8 (H) 35.0 - 45.0 fL    Platelets 321 910 - 494 thou/mm3    MPV 9.0 (L) 9.4 - 12.4 fL    Seg Neutrophils 69.4 %    Lymphocytes 16.8 %    Monocytes 9.9 %    Eosinophils 2.0 %    Basophils 0.8 %    Immature Granulocytes 1.1 %    Segs Absolute 6.4 1.8 - 7.7 thou/mm3    Lymphocytes Absolute 1.5 1.0 - 4.8 thou/mm3    Monocytes Absolute 0.9 0.4 - 1.3 thou/mm3    Eosinophils Absolute 0.2 0.0 - 0.4 thou/mm3    Basophils Absolute 0.1 0.0 - 0.1 thou/mm3    Immature Grans (Abs) 0.10 (H) 0.00 - 0.07 thou/mm3    nRBC 0 /100 wbc   Anion Gap   Result Value Ref Range    Anion Gap 13.0 8.0 - 16.0 meq/L   Magnesium   Result Value Ref Range    Magnesium 2.1 1.6 - 2.4 mg/dL   Glomerular Filtration Rate, Estimated   Result Value Ref Range    Est, Glom Filt Rate 81 (A) FY/NDB/5.30Z3   Basic Metabolic Panel w/ Reflex to MG   Result Value Ref Range    Sodium 142 135 - 145 meq/L    Potassium reflex Magnesium 2.8 (L) 3.5 - 5.2 meq/L    Chloride 107 98 - 111 meq/L    CO2 24 23 - 33 meq/L    Glucose 107 70 - 108 mg/dL    BUN 21 7 - 22 mg/dL    CREATININE 0.9 0.4 - 1.2 mg/dL    Calcium 9.1 8.5 - 10.5 mg/dL   CBC auto differential   Result Value Ref Range    WBC 9.9 4.8 - 10.8 thou/mm3    RBC 4.20 (L) 4.70 - 6.10 mill/mm3    Hemoglobin 12.1 (L) 14.0 - 18.0 gm/dl    Hematocrit 38.6 (L) 42.0 - 52.0 %    MCV 91.9 80.0 - 94.0 fL    MCH 28.8 26.0 - 33.0 pg    MCHC 31.3 (L) 32.2 - 35.5 gm/dl    RDW-CV 13.8 11.5 - 14.5 %    RDW-SD 46.5 (H) 35.0 - 45.0 fL    Platelets 484 704 - 428 thou/mm3    MPV 8.9 (L) 9.4 - 12.4 fL    Seg Neutrophils 70.1 % Lymphocytes 16.1 %    Monocytes 9.9 %    Eosinophils 2.5 %    Basophils 0.6 %    Immature Granulocytes 0.8 %    Segs Absolute 6.9 1.8 - 7.7 thou/mm3    Lymphocytes Absolute 1.6 1.0 - 4.8 thou/mm3    Monocytes Absolute 1.0 0.4 - 1.3 thou/mm3    Eosinophils Absolute 0.2 0.0 - 0.4 thou/mm3    Basophils Absolute 0.1 0.0 - 0.1 thou/mm3    Immature Grans (Abs) 0.08 (H) 0.00 - 0.07 thou/mm3    nRBC 0 /100 wbc   Anion Gap   Result Value Ref Range    Anion Gap 11.0 8.0 - 16.0 meq/L   Glomerular Filtration Rate, Estimated   Result Value Ref Range    Est, Glom Filt Rate 81 (A) ml/min/1.73m2   Magnesium   Result Value Ref Range    Magnesium 2.1 1.6 - 2.4 mg/dL   Valproic acid level, total   Result Value Ref Range    Valproic Acid Lvl 46.1 (L) 50.0 - 100.0 ug/mL   Basic Metabolic Panel w/ Reflex to MG   Result Value Ref Range    Sodium 144 135 - 145 meq/L    Potassium reflex Magnesium 3.2 (L) 3.5 - 5.2 meq/L    Chloride 108 98 - 111 meq/L    CO2 27 23 - 33 meq/L    Glucose 105 70 - 108 mg/dL    BUN 23 (H) 7 - 22 mg/dL    CREATININE 0.9 0.4 - 1.2 mg/dL    Calcium 8.8 8.5 - 10.5 mg/dL   CBC auto differential   Result Value Ref Range    WBC 8.6 4.8 - 10.8 thou/mm3    RBC 3.88 (L) 4.70 - 6.10 mill/mm3    Hemoglobin 11.3 (L) 14.0 - 18.0 gm/dl    Hematocrit 35.8 (L) 42.0 - 52.0 %    MCV 92.3 80.0 - 94.0 fL    MCH 29.1 26.0 - 33.0 pg    MCHC 31.6 (L) 32.2 - 35.5 gm/dl    RDW-CV 13.8 11.5 - 14.5 %    RDW-SD 46.2 (H) 35.0 - 45.0 fL    Platelets 895 554 - 063 thou/mm3    MPV 9.0 (L) 9.4 - 12.4 fL    Seg Neutrophils 61.1 %    Lymphocytes 24.9 %    Monocytes 7.6 %    Eosinophils 4.1 %    Basophils 0.9 %    Immature Granulocytes 1.4 %    Segs Absolute 5.3 1.8 - 7.7 thou/mm3    Lymphocytes Absolute 2.1 1.0 - 4.8 thou/mm3    Monocytes Absolute 0.7 0.4 - 1.3 thou/mm3    Eosinophils Absolute 0.4 0.0 - 0.4 thou/mm3    Basophils Absolute 0.1 0.0 - 0.1 thou/mm3    Immature Grans (Abs) 0.12 (H) 0.00 - 0.07 thou/mm3    nRBC 0 /100 wbc   Anion Gap Result Value Ref Range    Anion Gap 9.0 8.0 - 16.0 meq/L   Glomerular Filtration Rate, Estimated   Result Value Ref Range    Est, Glom Filt Rate 81 (A) ml/min/1.73m2   Magnesium   Result Value Ref Range    Magnesium 2.1 1.6 - 2.4 mg/dL   Basic Metabolic Panel w/ Reflex to MG   Result Value Ref Range    Sodium 141 135 - 145 meq/L    Potassium reflex Magnesium 3.6 3.5 - 5.2 meq/L    Chloride 109 98 - 111 meq/L    CO2 25 23 - 33 meq/L    Glucose 110 (H) 70 - 108 mg/dL    BUN 22 7 - 22 mg/dL    CREATININE 0.9 0.4 - 1.2 mg/dL    Calcium 8.7 8.5 - 10.5 mg/dL   CBC auto differential   Result Value Ref Range    WBC 8.7 4.8 - 10.8 thou/mm3    RBC 3.87 (L) 4.70 - 6.10 mill/mm3    Hemoglobin 11.5 (L) 14.0 - 18.0 gm/dl    Hematocrit 35.6 (L) 42.0 - 52.0 %    MCV 92.0 80.0 - 94.0 fL    MCH 29.7 26.0 - 33.0 pg    MCHC 32.3 32.2 - 35.5 gm/dl    RDW-CV 13.7 11.5 - 14.5 %    RDW-SD 45.3 (H) 35.0 - 45.0 fL    Platelets 414 381 - 209 thou/mm3    MPV 9.2 (L) 9.4 - 12.4 fL    Seg Neutrophils 63.8 %    Lymphocytes 22.8 %    Monocytes 6.6 %    Eosinophils 4.6 %    Basophils 0.9 %    Immature Granulocytes 1.3 %    Segs Absolute 5.6 1.8 - 7.7 thou/mm3    Lymphocytes Absolute 2.0 1.0 - 4.8 thou/mm3    Monocytes Absolute 0.6 0.4 - 1.3 thou/mm3    Eosinophils Absolute 0.4 0.0 - 0.4 thou/mm3    Basophils Absolute 0.1 0.0 - 0.1 thou/mm3    Immature Grans (Abs) 0.11 (H) 0.00 - 0.07 thou/mm3    nRBC 0 /100 wbc   Valproic acid level, total   Result Value Ref Range    Valproic Acid Lvl 53.1 50.0 - 100.0 ug/mL   Anion Gap   Result Value Ref Range    Anion Gap 7.0 (L) 8.0 - 16.0 meq/L   Glomerular Filtration Rate, Estimated   Result Value Ref Range    Est, Glom Filt Rate 81 (A) KD/WXH/2.86U5   Basic Metabolic Panel w/ Reflex to MG   Result Value Ref Range    Sodium 141 135 - 145 meq/L    Potassium reflex Magnesium 3.8 3.5 - 5.2 meq/L    Chloride 105 98 - 111 meq/L    CO2 27 23 - 33 meq/L    Glucose 99 70 - 108 mg/dL    BUN 18 7 - 22 mg/dL CREATININE 0.8 0.4 - 1.2 mg/dL    Calcium 8.8 8.5 - 10.5 mg/dL   CBC auto differential   Result Value Ref Range    WBC 8.8 4.8 - 10.8 thou/mm3    RBC 3.99 (L) 4.70 - 6.10 mill/mm3    Hemoglobin 11.6 (L) 14.0 - 18.0 gm/dl    Hematocrit 36.7 (L) 42.0 - 52.0 %    MCV 92.0 80.0 - 94.0 fL    MCH 29.1 26.0 - 33.0 pg    MCHC 31.6 (L) 32.2 - 35.5 gm/dl    RDW-CV 13.8 11.5 - 14.5 %    RDW-SD 45.4 (H) 35.0 - 45.0 fL    Platelets 758 321 - 065 thou/mm3    MPV 9.1 (L) 9.4 - 12.4 fL    Seg Neutrophils 66.2 %    Lymphocytes 21.2 %    Monocytes 6.7 %    Eosinophils 4.3 %    Basophils 0.7 %    Immature Granulocytes 0.9 %    Segs Absolute 5.8 1.8 - 7.7 thou/mm3    Lymphocytes Absolute 1.9 1.0 - 4.8 thou/mm3    Monocytes Absolute 0.6 0.4 - 1.3 thou/mm3    Eosinophils Absolute 0.4 0.0 - 0.4 thou/mm3    Basophils Absolute 0.1 0.0 - 0.1 thou/mm3    Immature Grans (Abs) 0.08 (H) 0.00 - 0.07 thou/mm3    nRBC 0 /100 wbc   Anion Gap   Result Value Ref Range    Anion Gap 9.0 8.0 - 16.0 meq/L   Glomerular Filtration Rate, Estimated   Result Value Ref Range    Est, Glom Filt Rate >90 ml/min/1.73m2   POCT Glucose   Result Value Ref Range    POC Glucose 96 70 - 108 mg/dl          No results found for this or any previous visit. No results found for this or any previous visit. No results found for this or any previous visit. No results found for this or any previous visit. Results for orders placed during the hospital encounter of 12/13/21    MRI BRAIN WO CONTRAST    Narrative  PROCEDURE: MRI BRAIN WO CONTRAST    INDICATION:  Seizure ([address] Varicella encephalitis. Follow-up. COMPARISON: MRI brain dated 9/8/2021. TECHNIQUE: Multiplanar and multiple spin echo fast sequence MRI images were obtained of the brain without contrast.    FINDINGS:  Images are motion degraded despite fast sequences. There is stable mild to moderate volume loss.  There are mild scattered focal areas of T2/FLAIR prolongation in the periventricular, subcortical and deep white matter, better seen on prior exam. These are  grossly stable. No intra or extra-axial mass is identified. No focal areas of restricted diffusion are present. The major vascular flow voids appear patent. The patient is status post bilateral lens extractions. Orbits are otherwise unremarkable. Paranasal sinuses are clear. There is a small left mastoid effusion. Impression  1. No evidence of acute intracranial abnormality. 2. Stable mild severity chronic microvascular angiopathy. 3. Small left mastoid effusion. **This report has been created using voice recognition software. It may contain minor errors which are inherent in voice recognition technology. **    Final report electronically signed by Dr. Boubacar Navarrete MD on 12/13/2021 5:03 PM    No results found for this or any previous visit. No results found for this or any previous visit. Results for orders placed during the hospital encounter of 09/06/21    CT HEAD WO CONTRAST    Narrative  Exam: CT brain without contrast    Comparison: None    Clinical history: Acute mental status change    Findings:  The ventricles are normal in size and position. No intracranial masses or midline shift identified. No abnormal extra-axial fluid collections are seen. No acute intracranial hemorrhage. The visualized paranasal sinuses are clear. No skull fracture identified. Nonspecific tissue swelling at the right forehead and in the right malar  region. Impression  Impression:  1. No acute intracranial hemorrhage or process identified. 2. Nonspecific tissue swelling at the right forehead and in the right  malar region. This document has been electronically signed by: Malachi Beth MD on  09/07/2021 11:02 PM    All CTs at this facility use dose modulation techniques and iterative  reconstructions, and/or weight-based dosing  when appropriate to reduce radiation to a low as reasonably achievable. Assessment:     Diagnosis Orders   1. 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 He is doing well. He denies any confusion or episodes of altered mental status. He has occasional headache. He appears more alert and is brighter with his expressions and appearance. He is on Depakote and is tolerating the medication well. Most recent Depakote level done 3/22/22=63.5. After detailed discussion with patient and his son we agreed on the following plan. Plan:  1. Continue with Depakote at current dose  2. Obtain CBC, Hepatic panel Depakote level 9/2022  3. Report any new seizure. 4. Continue following with speech, physical, and occupational therapy recommendations  5. No driving, swimming, operating heavy machinery or compromising heights until event free for 6 months. Report any new events. Call if any questions. 6. Call with any new symptoms or concerns. 7. Follow up in 6 months.        Total time 24 min    BRONWYN Wick - CNP negative

## 2022-06-10 NOTE — PATIENT INSTRUCTIONS
1. Continue with Depakote at current dose  2. Obtain CBC, Hepatic panel Depakote level 9/2022  3. Report any new seizure. 4. Continue following with speech, physical, and occupational therapy recommendations  5. No driving, swimming, operating heavy machinery or compromising heights until event free for 6 months. Report any new events. Call if any questions. 6. Call with any new symptoms or concerns. 7. Follow up in 6 months.

## 2022-08-17 NOTE — PROGRESS NOTES
Progress note: Infectious diseases    Patient - Pallavi Armendariz,  Age - [de-identified] y.o.    - 1940      Room Number - 4D-17/017-A   MRN -  [id number]   Acct # - [de-identified]  Date of Admission -  2021 11:26 AM    SUBJECTIVE:   He is still in ICU. Intubated  Responds to questions  Noted to have papular lesions on chest and flank area  OBJECTIVE   VITALS    height is 5' 7\" (1.702 m) and weight is 213 lb 10 oz (96.9 kg). His axillary temperature is 98.8 °F (37.1 °C). His blood pressure is 95/82 and his pulse is 83. His respiration is 17 and oxygen saturation is 97%. Wt Readings from Last 3 Encounters:   21 213 lb 10 oz (96.9 kg)   21 210 lb (95.3 kg)   19 207 lb 3.2 oz (94 kg)       I/O (24 Hours)    Intake/Output Summary (Last 24 hours) at 2021 1037  Last data filed at 2021 0600  Gross per 24 hour   Intake 5276 ml   Output 2150 ml   Net 3126 ml       General Appearance: Intubated, responds by nodding  HEENT - scabbed area over the right V1 distribution  Neck - Supple, no mass  Lungs - On mechanical ventilation. Bilateral good air entry, no rhonchi, no wheeze  Cardiovascular - Heart sounds are normal.  Regular rate and rhythm without murmur, gallop or rub.   Abdomen - soft, not distended, nontender  Neurologic - Responsive to painful stimuli only  Skin - papular lesions on the chest and flank area  Extremities - No edema, no cyanosis, clubbing     MEDICATIONS:      acyclovir  10 mg/kg (Ideal) IntraVENous Q12H    LORazepam  2 mg IntraVENous Once    levetiracetam  1,000 mg IntraVENous Q12H    midazolam  2 mg IntraVENous Once    prednisoLONE acetate  1 drop Right Eye 4 times per day    [Held by provider] QUEtiapine  25 mg Oral BID    [Held by provider] baclofen  20 mg Oral TID    calcium elemental  500 mg Oral BID    [Held by provider] lisinopril  20 mg Oral Daily    [Held by provider] magnesium oxide  400 mg Oral 4x Daily AC & HS    vitamin B-6  200 mg Oral Daily    [Held by provider] tamsulosin  0.4 mg Oral Daily    Vitamin D  1,000 Units Oral BID AC    zinc sulfate  50 mg Oral Daily    sodium chloride flush  5-40 mL IntraVENous 2 times per day      niCARdipine Stopped (09/11/21 2321)    dextrose 75 mL/hr (09/12/21 0225)    propofol 7.5 mcg/kg/min (09/12/21 0627)    sodium chloride       potassium chloride **OR** potassium alternative oral replacement **OR** potassium chloride, sodium chloride flush, sodium chloride, ondansetron **OR** ondansetron, polyethylene glycol, acetaminophen **OR** acetaminophen, gabapentin, morphine      LABS:     CBC:   Recent Labs     09/10/21  1030 09/11/21 0413 09/12/21 0446   WBC 8.3 9.7 12.3*   HGB 13.7* 13.9* 13.2*    202 214     BMP:    Recent Labs     09/10/21  1030 09/10/21  2045 09/11/21  0413 09/11/21  1615 09/12/21 0446   NA   < > 145 140 139 134*   K   < > 3.9 3.2* 3.7 3.3*   CL  --  110 105  --  98   CO2  --  21* 24  --  24   BUN  --  22 19  --  18   CREATININE  --  2.3* 1.9*  --  1.4*   GLUCOSE  --  144* 161*  --  127*    < > = values in this interval not displayed. Calcium:  Recent Labs     09/12/21 0446   CALCIUM 8.9     Ionized Calcium:No results for input(s): IONCA in the last 72 hours. Magnesium:  Recent Labs     09/12/21 0446   MG 1.9     Phosphorus:  Recent Labs     09/11/21 0413   PHOS 3.1        CULTURES:   UA:   No results for input(s): SPECGRAV, PHUR, COLORU, CLARITYU, MUCUS, PROTEINU, BLOODU, RBCUA, WBCUA, BACTERIA, NITRU, GLUCOSEU, BILIRUBINUR, UROBILINOGEN, KETUA, LABCAST, LABCASTTY, AMORPHOS in the last 72 hours.     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         ASSESSMENT/PLAN   Herpes zoster ophthalmicus -right V1 distribution, it is getting scabbed  Acute renal failure - Likely secondary to acyclovir nephrotoxicity. US renal normal. Patient received HD yesterday (9/9/21) with o net ultrafiltration.  - Nephrology following with continuation of hemodialysis.     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 It is unknown if the medication is excreted in breast milk.

## 2022-12-14 NOTE — PROGRESS NOTES
[facility] GENERAL PATIENT MESSAGE    Caller name: Becca    Contact number: [phone]    Relationship to patient: self    On patient contact list? Yes    Provider name:Dr. Farhad Mabry     Reason for call: Returning Mary's call. Warm transfer    Is this an urgent message (e.g., does caller require a response today?): No - use script below    Turnaround time given to caller:   \"This message will be sent to [state provider's full name]. The clinical team will return your call as soon as they review your message. Urgent messages will be returned by the end of the day. Non-urgent messages will be returned within 2 business days.\"           NEUROLOGY OUT PATIENT FOLLOW UP NOTE:  1/31/202210:04 AM    Lashay Figueroa is here for follow up for   Patient Active Problem List   Diagnosis    Nocturia    Intractable episodic paroxysmal hemicrania    Slow transit constipation    Aches    Memory difficulties    Bruise    Sleep difficulties    Intestinal malabsorption    Elevated lipids    Blood glucose elevated    Herpes zoster ophthalmicus    Encephalopathy    DEANDRE (acute kidney injury) ([facility] [address]    ATN (acute tubular necrosis) ([facility])    Hypokalemia     Follow up for varciella encephalitis. She is here to go over plan.        No Known Allergies    Current Outpatient Medications:     melatonin 3 MG TABS tablet, Take 3 mg by mouth nightly as needed, Disp: , Rfl:     divalproex (DEPAKOTE) 250 MG DR tablet, Take 3 tablets by mouth every 12 hours, Disp: 90 tablet, Rfl: 3    Cyanocobalamin (VITAMIN B 12 PO), Take by mouth daily, Disp: , Rfl:     vitamin D3 (CHOLECALCIFEROL) 400 units TABS tablet, Take 800 Units by mouth 2 times daily (before meals) , Disp: , Rfl:     calcium carbonate (OSCAL) 500 MG TABS tablet, Take 500 mg by mouth 2 times daily , Disp: , Rfl:     Omega-3 Fatty Acids (FISH OIL) 1000 MG CAPS, Take 3,000 mg by mouth 3 times daily , Disp: , Rfl:     ascorbic acid (VITAMIN C) 1000 MG tablet, Take 1,000 mg by mouth 4 times daily (before meals and nightly) For cholesterols, Disp: , Rfl:     Lysine 500 MG TABS, Take 1 tablet by mouth 4 times daily (before meals and nightly) For cholesterols , Disp: , Rfl:     B Complex-Folic Acid (L-090 BALANCED TR PO), Take 1 tablet by mouth 4 times daily (before meals and nightly) Natures Made B-100 time released, Disp: , Rfl:     DHEA 10 MG TABS, Take 50 mg by mouth daily , Disp: , Rfl:     lisinopril (PRINIVIL;ZESTRIL) 20 MG tablet, Take 1 tablet by mouth daily, Disp: , Rfl:     tamsulosin (FLOMAX) 0.4 MG capsule, Take 1 capsule by mouth daily, Disp: , Rfl:     Magnesium 400 MG CAPS, Take 1 capsule by mouth 4 times daily (before meals and nightly) , Disp: , Rfl:     Pyridoxine HCl (VITAMIN B-6) 100 MG tablet, Take 200 mg by mouth daily , Disp: , Rfl:     NONFORMULARY, Take 2 tablets by mouth daily LBS II , Disp: , Rfl:     zinc gluconate 50 MG tablet, Take 50 mg by mouth daily , Disp: , Rfl:     Carboxymethylcellulose Sodium (ARTIFICIAL TEARS OP), Apply to eye as needed (Patient not taking: Reported on 1/31/2022), Disp: , Rfl:     gabapentin (NEURONTIN) 300 MG capsule, Take 1 capsule by mouth 3 times daily as needed (pain) for up to 30 days. (Patient not taking: Reported on 10/6/2021), Disp: 90 capsule, Rfl: 3    baclofen (LIORESAL) 10 MG tablet, Take 20 mg by mouth 3 times daily  (Patient not taking: Reported on 1/31/2022), Disp: , Rfl:     Cinnamon 500 MG CAPS, Take 3 capsules by mouth 4 times daily Wolf with 2 ounces of half and half from [address] (Patient not taking: Reported on 10/6/2021), Disp: , Rfl:     Levomefolic Acid (5-MTHF PO), Take 10 mg by mouth every morning (before breakfast) Met[facility] Maintenance at [facility] (Patient not taking: Reported on 10/6/2021), Disp: , Rfl:     I reviewed the past medical history, allergies, medications, social history and family history. PE:   Vitals:    01/31/22 0955   BP: 120/88   Pulse: 76   Weight: 189 lb (85.7 kg)   Height: 5' 7\" (1.702 m)     General Appearance:  alert and cooperative  Gen: NAD, Language is Intact. Skin: no rash, lesion,  moist to touch. warm  Head: no rash, no icterus  Neck: There is no carotid bruits. The Neck is supple. There is no neck lymphadenopathy.    Neuro: reactive to light, vision is hazy secondary to corneal irritation  Cranial nerves III, IV, VI: Extraocular Movements: intact   Cranial nerve V: Facial sensation: intact   Cranial nerve VII:Facial strength: intact   Cranial nerve VIII: Hearing: intact   Cranial nerve IX: Palate Elevation intact bilaterally  Cranial nerve XI: Shoulder shrug intact bilaterally  Cranial nerve XII: Tongue midline   neck supple without rigidity, there is no limitation of range of motion of the neck. DTR's are brisk distal and symmetric  Babinski sign negative  Motor exam is 5/5 in the bilateral  upper and 4/5 in bilateral  lower extremities . Normal muscle tone. There is no muscle atrophy. Sensory is intact for light touch. Coordination: finger to nose, intact  Gait and station not tested, he can stand with minimal assistance he is in wheelchair   Abnormal movement none, vibration reduced distally  Skin - warm, dry to touch, normal coloration, no rashes, no suspicious skin lesions  Superficial temporal artery pulses are normal.   There is no limitation of range of motion of the neck. There is no resting tremor, no pin rolling, no bradykinesia, no Hypohonia, normal blink rate. Musculoskeletal: Has no hand arthritis, no limitation of ROM in any of the four extremities. There is no leg edema. DATA:              MRI BRAIN WO CONTRAST    Narrative  PROCEDURE: MRI BRAIN WO CONTRAST    INDICATION:  Seizure ([address] Varicella encephalitis. Follow-up. COMPARISON: MRI brain dated 9/8/2021. TECHNIQUE: Multiplanar and multiple spin echo fast sequence MRI images were obtained of the brain without contrast.    FINDINGS:  Images are motion degraded despite fast sequences. There is stable mild to moderate volume loss. There are mild scattered focal areas of T2/FLAIR prolongation in the periventricular, subcortical and deep white matter, better seen on prior exam. These are  grossly stable. No intra or extra-axial mass is identified. No focal areas of restricted diffusion are present. The major vascular flow voids appear patent. The patient is status post bilateral lens extractions. Orbits are otherwise unremarkable. Paranasal sinuses are clear. There is a small left mastoid effusion. Impression  1. No evidence of acute intracranial abnormality.   2. Stable mild severity chronic microvascular angiopathy. 3. Small left mastoid effusion. **This report has been created using voice recognition software. It may contain minor errors which are inherent in voice recognition technology. **    Final report electronically signed by Dr. Zane Ramírez MD on 12/13/2021 5:03 PM        CT HEAD WO CONTRAST    Narrative  Exam: CT brain without contrast    Comparison: None    Clinical history: Acute mental status change    Findings:  The ventricles are normal in size and position. No intracranial masses or midline shift identified. No abnormal extra-axial fluid collections are seen. No acute intracranial hemorrhage. The visualized paranasal sinuses are clear. No skull fracture identified. Nonspecific tissue swelling at the right forehead and in the right malar  region. Impression  Impression:  1. No acute intracranial hemorrhage or process identified. 2. Nonspecific tissue swelling at the right forehead and in the right  malar region. This document has been electronically signed by: Debbie Jenkins MD on  09/07/2021 11:02 PM    All CTs at this facility use dose modulation techniques and iterative  reconstructions, and/or weight-based dosing  when appropriate to reduce radiation to a low as reasonably achievable. EEG 12/2021:  IMPRESSION:  This is a normal awake and sleep EEG. There was no  evidence of epileptiform activity appreciated. Clinical correlation is  recommended.           CHANCE Gutierrez MD     D: 12/14/2021 8:55:03       Assessment:     Diagnosis Orders   1. Seizure ([facility] [address]     2. Varicella encephalitis        Follow up for varicella encephalitis, Seizure. The patient reports he is doing some better. He had testing performed. Repeat MRI brain 12/13/2021 shows no concerning findings. EEG 12/2021 was normal. Depakote level 10/2021=82.3, vitamin B6 level is low=4. 1. he denies seizure, his vision is not as blurry with the right. He can have frontal headache still.  He has non focal exam with CN and needs assistance to stand from wheelchair. He denies any seizure. After detailed discussion with patient and his family we agreed on the following plan. Plan:  1. Continue with Depakote at current dose  2. Obtain CBC, Hepatic panel Depakote level 4/2022  3. Report any new seizure. 4. Continue following with speech, physical, and occupational therapy recommendations  5. No driving, swimming, operating heavy machinery or compromising heights until event free for 6 months. Report any new events. Call if any questions. 6. Take Vitamin B6 over the counter supplements. 7. Call with any new symptoms or concerns. 8. Follow up in 4 months.        Total time 32 min    Regulo Aguirre MD

## 2023-01-13 ENCOUNTER — OFFICE VISIT (OUTPATIENT)
Dept: NEUROLOGY | Age: 83
End: 2023-01-13
Payer: MEDICARE

## 2023-01-13 VITALS
BODY MASS INDEX: 31.79 KG/M2 | HEART RATE: 72 BPM | HEIGHT: 67 IN | OXYGEN SATURATION: 97 % | DIASTOLIC BLOOD PRESSURE: 82 MMHG | SYSTOLIC BLOOD PRESSURE: 124 MMHG

## 2023-01-13 DIAGNOSIS — R56.9 SEIZURE (HCC): Primary | ICD-10-CM

## 2023-01-13 DIAGNOSIS — B01.11 VARICELLA ENCEPHALITIS: ICD-10-CM

## 2023-01-13 PROCEDURE — 1123F ACP DISCUSS/DSCN MKR DOCD: CPT | Performed by: PSYCHIATRY & NEUROLOGY

## 2023-01-13 PROCEDURE — 99213 OFFICE O/P EST LOW 20 MIN: CPT | Performed by: PSYCHIATRY & NEUROLOGY

## 2023-01-13 RX ORDER — DIVALPROEX SODIUM 250 MG/1
750 TABLET, DELAYED RELEASE ORAL EVERY 12 HOURS SCHEDULED
Qty: 180 TABLET | Refills: 5 | Status: SHIPPED | OUTPATIENT
Start: 2023-01-13

## 2023-01-13 NOTE — PROGRESS NOTES
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 No Known Allergies    Current Outpatient Medications:     ferrous sulfate (IRON 325) 325 (65 Fe) MG tablet, Take 325 mg by mouth daily (with breakfast), Disp: , Rfl:     divalproex (DEPAKOTE) 250 MG DR tablet, Take 3 tablets by mouth every 12 hours, Disp: 180 tablet, Rfl: 5    melatonin 3 MG TABS tablet, Take 3 mg by mouth nightly as needed, Disp: , Rfl:     Cyanocobalamin (VITAMIN B 12 PO), Take by mouth daily, Disp: , Rfl:     baclofen (LIORESAL) 10 MG tablet, Take 20 mg by mouth 3 times daily, Disp: , Rfl:     ascorbic acid (VITAMIN C) 1000 MG tablet, Take 1,000 mg by mouth 4 times daily (before meals and nightly) For cholesterols, Disp: , Rfl:     B Complex-Folic Acid (A-020 BALANCED TR PO), Take 1 tablet by mouth 4 times daily (before meals and nightly) Natures Made B-100 time released, Disp: , Rfl:     lisinopril (PRINIVIL;ZESTRIL) 20 MG tablet, Take 1 tablet by mouth daily, Disp: , Rfl:     magnesium 200 MG TABS tablet, Take 1 capsule by mouth 2 times daily (before meals) , Disp: , Rfl:     Carboxymethylcellulose Sodium (ARTIFICIAL TEARS OP), Apply to eye as needed (Patient not taking: No sig reported), Disp: , Rfl:     gabapentin (NEURONTIN) 300 MG capsule, Take 1 capsule by mouth 3 times daily as needed (pain) for up to 30 days.  (Patient not taking: Reported on 10/6/2021), Disp: 90 capsule, Rfl: 3    vitamin D3 (CHOLECALCIFEROL) 400 units TABS tablet, Take 800 Units by mouth 2 times daily (before meals)  (Patient not taking: No sig reported), Disp: , Rfl:     calcium carbonate (OSCAL) 500 MG TABS tablet, Take 500 mg by mouth 2 times daily  (Patient not taking: No sig reported), Disp: , Rfl:     Omega-3 Fatty Acids (FISH OIL) 1000 MG CAPS, Take 3,000 mg by mouth 3 times daily  (Patient not taking: No sig reported), Disp: , Rfl:     Lysine 500 MG TABS, Take 1 tablet by mouth 4 times daily (before meals and nightly) For cholesterols  (Patient not taking: No sig reported), Disp: , Rfl:     DHEA 10 MG TABS, Take 50 mg by mouth daily  (Patient not taking: No sig reported), Disp: , Rfl:     Cinnamon 500 MG CAPS, Take 3 capsules by mouth 4 times daily Wolf with 2 ounces of half and half from [address] (Patient not taking: No sig reported), Disp: , Rfl:     Levomefolic Acid (5-MTHF PO), Take 10 mg by mouth every morning (before breakfast) Metabolic Maintenance at [facility] (Patient not taking: No sig reported), Disp: , Rfl:     tamsulosin (FLOMAX) 0.4 MG capsule, Take 1 capsule by mouth daily (Patient not taking: No sig reported), Disp: , Rfl:     Pyridoxine HCl (VITAMIN B-6) 100 MG tablet, Take 200 mg by mouth daily  (Patient not taking: Reported on 1/13/2023), Disp: , Rfl:     NONFORMULARY, Take 2 tablets by mouth daily LBS II  (Patient not taking: No sig reported), Disp: , Rfl:     zinc gluconate 50 MG tablet, Take 50 mg by mouth daily  (Patient not taking: No sig reported), Disp: , Rfl:     I reviewed the past medical history, allergies, medications, social history and family history. PE:   Vitals:    01/13/23 1232   BP: 124/82   Site: Right Upper Arm   Position: Sitting   Cuff Size: Large Adult   Pulse: 72   SpO2: 97%   Height: 5' 7\" (1.702 m)     General Appearance:  awake, alert, oriented, in no acute distress  Gen: NAD, Language is Intact. Skin: no rash, lesion, dry  to touch. warm  Head: no rash, no icterus  Neck: There is no carotid bruits. The Neck is supple. There is no neck lymphadenopathy. Neuro: CN 2-12 grossly intact with no focal deficits. Power 5/5 Throughout symmetric, Reflexes are  symmetric. Long tracts are intact. Cerebellar exam is Intact. Sensory exam is intact to light touch. Gait is not tested he is in wheelchair  Musculoskeletal:  Has no hand arthritis, no limitation of ROM in any of the four extremities.   Lower extremities no edema          DATA:      Results for orders placed or performed during the hospital encounter of 09/06/21   Culture, Blood 1    Specimen: Blood   Result Value Ref Range    Blood Culture, Routine No growth-preliminary No growth    Culture, Blood 2    Specimen: Blood   Result Value Ref Range    Blood Culture, Routine No growth-preliminary No growth    Culture, MRSA, Screening    Specimen: Rectum   Result Value Ref Range    MRSA SCREEN No MRSA isolated    VRE Screen by PCR    Specimen: Rectal Swab   Result Value Ref Range    Vancomycin Resistant Enterococcus NEGATIVE    Culture, Respiratory    Specimen: Esophageal Brush Specimen Quantitative Count   Result Value Ref Range    Respiratory Culture       Endotracheal tube cultures do not correlate well with chest x-ray results and are a poor predictor of true pulmonary infections. Normal live    Gram Stain Result       Few segmented neutrophils observed. Few epithelial cells observed. Few gram positive cocci in pairs and chains.    Culture, Urine    Specimen: Urine, catheter   Result Value Ref Range    Organism Enterococcus faecalis - (Group D) (A)     Urine Culture, Routine [place] count: >100,000 CFU/mL        Susceptibility    Enterococcus faecalis - BACTERIAL SUSCEPTIBILITY PANEL BY [facility]     Penicillin G 2 Sensitive mcg/mL     ampicillin <=2 Sensitive mcg/mL     nitrofurantoin <=16 Sensitive mcg/mL   Meningitis Encephalitis Panel CSF, Molecular    Specimen: CSF   Result Value Ref Range    ESCHERICHIA COLI K1 CSF FILM ARRAY Not Detected Not Detected    HAEMOPHILUS INFLUENZA CSF FILM ARRAY Not Detected Not Detected    LISTERIA MONOCYTOGENES CSF FILM ARRAY Not Detected Not Detected    NEISSERIA MENIGITIDIS CSF FILM ARRAY Not Detected Not Detected    STREPTOCOCCUS AGALACTIAE CSF FILM ARRAY Not Detected Not Detected    STREPTOCOCCUS PNEUMONIAE CSF FILM ARRAY Not Detected Not Detected    CYTOMEGALOVIRUS (CMV) CSF FILM ARRAY Not Detected Not Detected    Enterovirus Detect PCR Not Detected Not Detected    HSV-1 CSF FILM ARRAY Not Detected Not Detected    HSV-2 CSF FILM ARRAY Not Detected Not Detected    HHV-6 (HERPESVIRUS 6) CSF FILM ARRAY Not Detected Not Detected    PARECHOVIRUS CSF FILM ARRAY Not Detected Not Detected    Varicella-Zoster, PCR Detected (AA) Not Detected    CRYPTOCOCCUS NEOFORMANS/CHUYITA CSF FILM ARR. Not Detected Not Detected   Culture, Blood 2    Specimen: Blood   Result Value Ref Range    Blood Culture, Routine No growth-preliminary No growth    Clostridium Difficile Toxin/Antigen    Specimen: Stool   Result Value Ref Range    C.diff Toxin/Antigen NEGATIVE    Culture, Genital    Specimen: Penis Male Patient; Genital   Result Value Ref Range    Genital Culture, Routine (A)      Culture also yielded heavy growth of alpha hemolytic Streptococcus species, gram positive bacilli most consistent with Corynebacterium species, and very light growth of additional enteric gram negative bacilli. No GC isolated Current therapy ineffective in vitro for isolate.    Gram Stain Result       Rare segmented neutrophils observed. Few epithelial cells observed. Many gram positive cocci occurring singly and in pairs. Many small gram positive bacilli.    Organism Klebsiella aerogenes (A)     Genital Culture, Routine       light growth Klebsiella aerogenes which may be initially susceptible to third generation cephalosporins may develop resistance within three to four days of initiation of this antimicrobial therapy.       Susceptibility    Klebsiella aerogenes - BACTERIAL SUSCEPTIBILITY PANEL BY [facility]     gentamicin <=1 Sensitive mcg/mL     cefepime <=1 Sensitive mcg/mL     ciprofloxacin <=0.25 Sensitive mcg/mL     trimethoprim-sulfamethoxazole <=20 Sensitive mcg/mL   COVID-19, Rapid    Specimen: Nasopharyngeal Swab   Result Value Ref Range    SARS-CoV-2, NAAT NOT DETECTED NOT DETECTED   CBC auto differential   Result Value Ref Range    WBC 6.7 4.8 - 10.8 thou/mm3    RBC 5.18 4.70 - 6.10  mill/mm3    Hemoglobin 15.3 14.0 - 18.0 gm/dl    Hematocrit 45.9 42.0 - 52.0 %    MCV 88.6 80.0 - 94.0 fL    MCH 29.5 26.0 - 33.0 pg    MCHC 33.3 32.2 - 35.5 gm/dl    RDW-CV 13.5 11.5 - 14.5 %    RDW-SD 44.1 35.0 - 45.0 fL    Platelets 029 133 - 905 thou/mm3    MPV 8.8 (L) 9.4 - 12.4 fL    Seg Neutrophils 75.1 %    Lymphocytes 12.2 %    Monocytes 9.9 %    Eosinophils 1.8 %    Basophils 0.4 %    Immature Granulocytes 0.6 %    Segs Absolute 5.0 1.8 - 7.7 thou/mm3    Lymphocytes Absolute 0.8 (L) 1.0 - 4.8 thou/mm3    Monocytes Absolute 0.7 0.4 - 1.3 thou/mm3    Eosinophils Absolute 0.1 0.0 - 0.4 thou/mm3    Basophils Absolute 0.0 0.0 - 0.1 thou/mm3    Immature Grans (Abs) 0.04 0.00 - 0.07 thou/mm3    nRBC 0 /100 wbc   Basic Metabolic Panel   Result Value Ref Range    Sodium 132 (L) 135 - 145 meq/L    Potassium 3.9 3.5 - 5.2 meq/L    Chloride 97 (L) 98 - 111 meq/L    CO2 23 23 - 33 meq/L    Glucose 109 (H) 70 - 108 mg/dL    BUN 13 7 - 22 mg/dL    Creatinine 0.7 0.4 - 1.2 mg/dL    Calcium 9.1 8.5 - 10.5 mg/dL   Anion Gap   Result Value Ref Range    Anion Gap 12.0 8.0 - 16.0 meq/L   Glomerular Filtration Rate, Estimated   Result Value Ref Range    Est, Glom Filt Rate >90 ml/min/1.73m2   Osmolality   Result Value Ref Range    Osmolality Calc 265.2 (L) 275.0 - 300.0 mOsmol/kg   Comprehensive Metabolic Panel w/ Reflex to MG   Result Value Ref Range    Glucose 110 (H) 70 - 108 mg/dL    Creatinine 2.3 (H) 0.4 - 1.2 mg/dL    BUN 23 (H) 7 - 22 mg/dL    Sodium 136 135 - 145 meq/L    Potassium reflex Magnesium 4.2 3.5 - 5.2 meq/L    Chloride 99 98 - 111 meq/L    CO2 25 23 - 33 meq/L    Calcium 9.0 8.5 - 10.5 mg/dL    AST 17 5 - 40 U/L    Alkaline Phosphatase 79 38 - 126 U/L    Total Protein 6.2 6.1 - 8.0 g/dL    Albumin 3.7 3.5 - 5.1 g/dL    Total Bilirubin 0.8 0.3 - 1.2 mg/dL    ALT 10 (L) 11 - 66 U/L   CBC auto differential   Result Value Ref Range    WBC 7.8 4.8 - 10.8 thou/mm3    RBC 4.84 4.70 - 6.10 mill/mm3    Hemoglobin 14.0 14.0 - 18.0 gm/dl    Hematocrit 43.7 42.0 - 52.0 %    MCV 90.3 80.0 - 94.0 fL    MCH 28.9 26.0 - 33.0 pg    MCHC 32.0 (L) 32.2 - 35.5 gm/dl    RDW-CV 14.0 11.5 - 14.5 %    RDW-SD 46.3 (H) 35.0 - 45.0 fL    Platelets 250 293 - 566 thou/mm3    MPV 9.3 (L) 9.4 - 12.4 fL    Seg Neutrophils 77.0 %    Lymphocytes 10.1 %    Monocytes 11.0 %    Eosinophils 1.0 %    Basophils 0.3 %    Immature Granulocytes 0.6 %    Segs Absolute 6.0 1.8 - 7.7 thou/mm3    Lymphocytes Absolute 0.8 (L) 1.0 - 4.8 thou/mm3    Monocytes Absolute 0.9 0.4 - 1.3 thou/mm3    Eosinophils Absolute 0.1 0.0 - 0.4 thou/mm3    Basophils Absolute 0.0 0.0 - 0.1 thou/mm3    Immature Grans (Abs) 0.05 0.00 - 0.07 thou/mm3    nRBC 0 /100 wbc   Anion Gap   Result Value Ref Range    Anion Gap 12.0 8.0 - 16.0 meq/L   Glomerular Filtration Rate, Estimated   Result Value Ref Range    Est, Glom Filt Rate 27 (A) ml/min/1.73m2   Lactic acid, plasma   Result Value Ref Range    Lactic Acid 0.9 0.5 - 2.0 mmol/L   Procalcitonin   Result Value Ref Range    Procalcitonin 0.80 (H) 0.01 - 0.09 ng/mL   Blood Gas, Arterial   Result Value Ref Range    pH, Blood Gas 7.43 7.35 - 7.45    PCO2 33 (L) 35 - 45 mmhg    PO2 79 71 - 104 mmhg    HCO3 22 (L) 23 - 28 mmol/l    Base Excess (Calculated) -2.0 -2.5 - 2.5 mmol/l    O2 Sat 96 %    DEVICE Room Air     Jason Test Positive     Source: R Radial     COLLECTED BY: [id number]    Ammonia   Result Value Ref Range    Ammonia 33 11 - 60 umol/L   CBC auto differential   Result Value Ref Range    WBC 6.4 4.8 - 10.8 thou/mm3    RBC 4.30 (L) 4.70 - 6.10 mill/mm3    Hemoglobin 12.3 (L) 14.0 - 18.0 gm/dl    Hematocrit 39.0 (L) 42.0 - 52.0 %    MCV 90.7 80.0 - 94.0 fL    MCH 28.6 26.0 - 33.0 pg    MCHC 31.5 (L) 32.2 - 35.5 gm/dl    RDW-CV 14.3 11.5 - 14.5 %    RDW-SD 47.2 (H) 35.0 - 45.0 fL    Platelets 163 710 - 143 thou/mm3    MPV 9.5 9.4 - 12.4 fL    Seg Neutrophils 67.3 %    Lymphocytes 17.9 %    Monocytes 10.9 %    Eosinophils 2.8 % Basophils 0.5 %    Immature Granulocytes 0.6 %    Segs Absolute 4.3 1.8 - 7.7 thou/mm3    Lymphocytes Absolute 1.1 1.0 - 4.8 thou/mm3    Monocytes Absolute 0.7 0.4 - 1.3 thou/mm3    Eosinophils Absolute 0.2 0.0 - 0.4 thou/mm3    Basophils Absolute 0.0 0.0 - 0.1 thou/mm3    Immature Grans (Abs) 0.04 0.00 - 0.07 thou/mm3    nRBC 0 /100 wbc   Basic Metabolic Panel w/ Reflex to MG   Result Value Ref Range    Sodium 136 135 - 145 meq/L    Potassium reflex Magnesium 4.5 3.5 - 5.2 meq/L    Chloride 102 98 - 111 meq/L    CO2 19 (L) 23 - 33 meq/L    Glucose 99 70 - 108 mg/dL    BUN 40 (H) 7 - 22 mg/dL    Creatinine 5.1 (HH) 0.4 - 1.2 mg/dL    Calcium 8.3 (L) 8.5 - 10.5 mg/dL   Urinalysis with microscopic   Result Value Ref Range    Glucose, Urine NEGATIVE NEGATIVE mg/dl    Bilirubin Urine NEGATIVE NEGATIVE    Ketones, Urine NEGATIVE NEGATIVE    Specific Gravity, UA <1.005 1.002 - 1.030    Blood, Urine MODERATE (A) NEGATIVE    pH, UA 6.0 5.0 - 9.0    Protein, UA TRACE (A) NEGATIVE mg/dl    Urobilinogen, Urine 0.2 0.0 - 1.0 eu/dl    Nitrite, Urine NEGATIVE NEGATIVE    Leukocyte Esterase, Urine LARGE (A) NEGATIVE    Color, UA YELLOW YELLOW-STRAW    Character, Urine CLOUDY (A) [id number]    RBC, UA 3-5 0-2/hpf /hpf    WBC, UA > 200 0-4/hpf /hpf    Epithelial Cells, UA NONE SEEN 3-5/hpf /hpf    Bacteria, UA FEW FEW/NONE SEEN    Casts NONE SEEN NONE SEEN /lpf    Crystals NONE SEEN NONE SEEN    Renal Epithelial, UA NONE SEEN NONE SEEN    Yeast, UA NONE SEEN NONE SEEN    Casts NONE SEEN /lpf    Miscellaneous Lab Test Result NONE SEEN    MRSA by PCR    Specimen: Nares   Result Value Ref Range    MRSA SCREEN RT-PCR NEGATIVE    Anion Gap   Result Value Ref Range    Anion Gap 15.0 8.0 - 16.0 meq/L   Glomerular Filtration Rate, Estimated   Result Value Ref Range    Est, Glom Filt Rate 11 (A) ml/min/1.73m2   Microalbumin / Creatinine Urine Ratio   Result Value Ref Range    Microalbumin, Random Urine 6.29 mg/dL    Creatinine, Urine 29.1 mg/dL    Microalb/Creat Ratio 216 (H) 0 - 30 mg/g   Creatinine, Random Urine   Result Value Ref Range    Creatinine, Urine 29.1 mg/dl   Sodium, urine, random   Result Value Ref Range    Sodium, Ur 30 meq/l   Glucose, CSF   Result Value Ref Range    Glucose, CSF 61 40 - 80 mg/dl   Protein, CSF   Result Value Ref Range    Protein, CSF 25 12 - 60 mg/dl   CSF Protein + Glucose   Result Value Ref Range    Protein, CSF 25 12 - 60 mg/dl    Glucose, CSF 60 40 - 80 mg/dl   CSF Cell Count with Differential   Result Value Ref Range    Color, CSF COLORLESS     Character, CSF CLEAR     Tube Volume Received CSF 15.0 ml    Total Nucleated Cells CSF 2 0 - 5 /cumm    RBC, CSF 2 0/cumm /cumm    Lymphs, CSF 4 0 - 90 %    Pathologist Kisha Cifuentes M.D.     Eosinophil smear urine   Result Value Ref Range    Eosinophil Smear No Eos Seen No Eos Seen    Specimen Urine    CBC auto differential   Result Value Ref Range    WBC 8.5 4.8 - 10.8 thou/mm3    RBC 4.70 4.70 - 6.10 mill/mm3    Hemoglobin 13.6 (L) 14.0 - 18.0 gm/dl    Hematocrit 43.1 42.0 - 52.0 %    MCV 91.7 80.0 - 94.0 fL    MCH 28.9 26.0 - 33.0 pg    MCHC 31.6 (L) 32.2 - 35.5 gm/dl    RDW-CV 14.4 11.5 - 14.5 %    RDW-SD 48.4 (H) 35.0 - 45.0 fL    Platelets 312 140 - 392 thou/mm3    MPV 9.2 (L) 9.4 - 12.4 fL    Seg Neutrophils 75.3 %    Lymphocytes 10.7 %    Monocytes 12.1 %    Eosinophils 1.2 %    Basophils 0.2 %    Immature Granulocytes 0.5 %    Segs Absolute 6.4 1.8 - 7.7 thou/mm3    Lymphocytes Absolute 0.9 (L) 1.0 - 4.8 thou/mm3    Monocytes Absolute 1.0 0.4 - 1.3 thou/mm3    Eosinophils Absolute 0.1 0.0 - 0.4 thou/mm3    Basophils Absolute 0.0 0.0 - 0.1 thou/mm3    Immature Grans (Abs) 0.04 0.00 - 0.07 thou/mm3    nRBC 0 /100 wbc   Basic Metabolic Panel w/ Reflex to MG   Result Value Ref Range    Sodium 140 135 - 145 meq/L    Potassium reflex Magnesium 4.7 3.5 - 5.2 meq/L    Chloride 109 98 - 111 meq/L    CO2 19 (L) 23 - 33 meq/L    Glucose 106 70 - 108 mg/dL    BUN 41 (H) 7 - 22 mg/dL    Creatinine 5.5 (HH) 0.4 - 1.2 mg/dL    Calcium 8.1 (L) 8.5 - 10.5 mg/dL   SPECIMEN REJECTION   Result Value Ref Range    Rejected Test bmp     Reason for Rejection see below    Basic Metabolic Panel   Result Value Ref Range    Sodium 138 135 - 145 meq/L    Potassium 4.2 3.5 - 5.2 meq/L    Chloride 105 98 - 111 meq/L    CO2 21 (L) 23 - 33 meq/L    Glucose 100 70 - 108 mg/dL    BUN 42 (H) 7 - 22 mg/dL    Creatinine 5.2 (HH) 0.4 - 1.2 mg/dL    Calcium 8.4 (L) 8.5 - 10.5 mg/dL   Anion Gap   Result Value Ref Range    Anion Gap 12.0 8.0 - 16.0 meq/L   Glomerular Filtration Rate, Estimated   Result Value Ref Range    Est, Glom Filt Rate 11 (A) ml/min/1.73m2   Magnesium   Result Value Ref Range    Magnesium 2.5 (H) 1.6 - 2.4 mg/dL   Prolactin   Result Value Ref Range    Prolactin 26.8 ng/ml   Calcium, Ionized   Result Value Ref Range    Calcium, Ionized 1.06 (L) 1.12 - 1.32 mmol/L   Lactic acid, plasma   Result Value Ref Range    Lactic Acid 1.1 0.5 - 2.0 mmol/L   Phosphorus   Result Value Ref Range    Phosphorus 5.6 (H) 2.4 - 4.7 mg/dL   TSH with Reflex   Result Value Ref Range    TSH 0.792 0.400 - 4.200 uIU/mL   Anion Gap   Result Value Ref Range    Anion Gap 12.0 8.0 - 16.0 meq/L   Glomerular Filtration Rate, Estimated   Result Value Ref Range    Est, Glom Filt Rate 10 (A) ml/min/1.73m2   Hepatitis B surface antigen   Result Value Ref Range    Hepatitis B Surface Ag Negative    Hepatitis B surface antibody   Result Value Ref Range    Hep B S Ab Negative    Hepatitis B core antibody, IgM   Result Value Ref Range    Hep B Core Ab, IgM Negative    Basic Metabolic Panel w/ Reflex to MG   Result Value Ref Range    Sodium 147 (H) 135 - 145 meq/L    Potassium reflex Magnesium 3.8 3.5 - 5.2 meq/L    Chloride 113 (H) 98 - 111 meq/L    CO2 22 (L) 23 - 33 meq/L    Glucose 119 (H) 70 - 108 mg/dL    BUN 24 (H) 7 - 22 mg/dL    Creatinine 2.9 (H) 0.4 - 1.2 mg/dL    Calcium 9.1 8.5 - 10.5 mg/dL   CBC Auto Differential Result Value Ref Range    WBC 8.3 4.8 - 10.8 thou/mm3    RBC 4.64 (L) 4.70 - 6.10 mill/mm3    Hemoglobin 13.7 (L) 14.0 - 18.0 gm/dl    Hematocrit 41.5 (L) 42.0 - 52.0 %    MCV 89.4 80.0 - 94.0 fL    MCH 29.5 26.0 - 33.0 pg    MCHC 33.0 32.2 - 35.5 gm/dl    RDW-CV 14.6 (H) 11.5 - 14.5 %    RDW-SD 47.3 (H) 35.0 - 45.0 fL    Platelets 714 605 - 899 thou/mm3    MPV 8.9 (L) 9.4 - 12.4 fL    Seg Neutrophils 78.2 %    Lymphocytes 10.3 %    Monocytes 8.6 %    Eosinophils 1.9 %    Basophils 0.4 %    Immature Granulocytes 0.6 %    Segs Absolute 6.5 1.8 - 7.7 thou/mm3    Lymphocytes Absolute 0.9 (L) 1.0 - 4.8 thou/mm3    Monocytes Absolute 0.7 0.4 - 1.3 thou/mm3    Eosinophils Absolute 0.2 0.0 - 0.4 thou/mm3    Basophils Absolute 0.0 0.0 - 0.1 thou/mm3    Immature Grans (Abs) 0.05 0.00 - 0.07 thou/mm3    nRBC 0 /100 wbc   Anion Gap   Result Value Ref Range    Anion Gap 12.0 8.0 - 16.0 meq/L   Glomerular Filtration Rate, Estimated   Result Value Ref Range    Est, Glom Filt Rate 21 (A) YL/FHD/9.50O0   Basic Metabolic Panel w/ Reflex to MG   Result Value Ref Range    Sodium 140 135 - 145 meq/L    Potassium reflex Magnesium 3.2 (L) 3.5 - 5.2 meq/L    Chloride 105 98 - 111 meq/L    CO2 24 23 - 33 meq/L    Glucose 161 (H) 70 - 108 mg/dL    BUN 19 7 - 22 mg/dL    Creatinine 1.9 (H) 0.4 - 1.2 mg/dL    Calcium 9.3 8.5 - 10.5 mg/dL   CBC auto differential   Result Value Ref Range    WBC 9.7 4.8 - 10.8 thou/mm3    RBC 4.82 4.70 - 6.10 mill/mm3    Hemoglobin 13.9 (L) 14.0 - 18.0 gm/dl    Hematocrit 42.9 42.0 - 52.0 %    MCV 89.0 80.0 - 94.0 fL    MCH 28.8 26.0 - 33.0 pg    MCHC 32.4 32.2 - 35.5 gm/dl    RDW-CV 14.3 11.5 - 14.5 %    RDW-SD 46.2 (H) 35.0 - 45.0 fL    Platelets 726 147 - 197 thou/mm3    MPV 8.8 (L) 9.4 - 12.4 fL    Seg Neutrophils 78.6 %    Lymphocytes 10.6 %    Monocytes 9.2 %    Eosinophils 0.9 %    Basophils 0.3 %    Immature Granulocytes 0.4 %    Segs Absolute 7.6 1.8 - 7.7 thou/mm3    Lymphocytes Absolute 1.0 1.0 - 4.8 thou/mm3    Monocytes Absolute 0.9 0.4 - 1.3 thou/mm3    Eosinophils Absolute 0.1 0.0 - 0.4 thou/mm3    Basophils Absolute 0.0 0.0 - 0.1 thou/mm3    Immature Grans (Abs) 0.04 0.00 - 0.07 thou/mm3    nRBC 0 /100 wbc   Phosphorus   Result Value Ref Range    Phosphorus 3.1 2.4 - 4.7 mg/dL   Basic Metabolic Panel   Result Value Ref Range    Sodium 145 135 - 145 meq/L    Potassium 3.9 3.5 - 5.2 meq/L    Chloride 110 98 - 111 meq/L    CO2 21 (L) 23 - 33 meq/L    Glucose 144 (H) 70 - 108 mg/dL    BUN 22 7 - 22 mg/dL    Creatinine 2.3 (H) 0.4 - 1.2 mg/dL    Calcium 9.3 8.5 - 10.5 mg/dL   Anion Gap   Result Value Ref Range    Anion Gap 14.0 8.0 - 16.0 meq/L   Glomerular Filtration Rate, Estimated   Result Value Ref Range    Est, Glom Filt Rate 27 (A) ml/min/1.73m2   Anion Gap   Result Value Ref Range    Anion Gap 11.0 8.0 - 16.0 meq/L   Magnesium   Result Value Ref Range    Magnesium 2.0 1.6 - 2.4 mg/dL   Glomerular Filtration Rate, Estimated   Result Value Ref Range    Est, Glom Filt Rate 34 (A) ml/min/1.73m2   Potassium   Result Value Ref Range    Potassium 3.7 3.5 - 5.2 meq/L   Sodium   Result Value Ref Range    Sodium 139 135 - 145 meq/L   Basic Metabolic Panel w/ Reflex to MG   Result Value Ref Range    Sodium 134 (L) 135 - 145 meq/L    Potassium reflex Magnesium 3.3 (L) 3.5 - 5.2 meq/L    Chloride 98 98 - 111 meq/L    CO2 24 23 - 33 meq/L    Glucose 127 (H) 70 - 108 mg/dL    BUN 18 7 - 22 mg/dL    Creatinine 1.4 (H) 0.4 - 1.2 mg/dL    Calcium 8.9 8.5 - 10.5 mg/dL   CBC auto differential   Result Value Ref Range    WBC 12.3 (H) 4.8 - 10.8 thou/mm3    RBC 4.52 (L) 4.70 - 6.10 mill/mm3    Hemoglobin 13.2 (L) 14.0 - 18.0 gm/dl    Hematocrit 40.1 (L) 42.0 - 52.0 %    MCV 88.7 80.0 - 94.0 fL    MCH 29.2 26.0 - 33.0 pg    MCHC 32.9 32.2 - 35.5 gm/dl    RDW-CV 14.1 11.5 - 14.5 %    RDW-SD 45.3 (H) 35.0 - 45.0 fL    Platelets 110 187 - 056 thou/mm3    MPV 8.8 (L) 9.4 - 12.4 fL    Seg Neutrophils 77.0 %    Lymphocytes 12.6 % Monocytes 7.2 %    Eosinophils 1.9 %    Basophils 0.3 %    Immature Granulocytes 1.0 %    Segs Absolute 9.5 (H) 1.8 - 7.7 thou/mm3    Lymphocytes Absolute 1.5 1.0 - 4.8 thou/mm3    Monocytes Absolute 0.9 0.4 - 1.3 thou/mm3    Eosinophils Absolute 0.2 0.0 - 0.4 thou/mm3    Basophils Absolute 0.0 0.0 - 0.1 thou/mm3    Immature Grans (Abs) 0.12 (H) 0.00 - 0.07 thou/mm3    nRBC 0 /100 wbc   Anion Gap   Result Value Ref Range    Anion Gap 12.0 8.0 - 16.0 meq/L   Magnesium   Result Value Ref Range    Magnesium 1.9 1.6 - 2.4 mg/dL   Glomerular Filtration Rate, Estimated   Result Value Ref Range    Est, Glom Filt Rate 49 (A) ml/min/1.73m2   Potassium   Result Value Ref Range    Potassium 3.6 3.5 - 5.2 meq/L   Basic Metabolic Panel w/ Reflex to MG   Result Value Ref Range    Sodium 135 135 - 145 meq/L    Potassium reflex Magnesium 3.7 3.5 - 5.2 meq/L    Chloride 99 98 - 111 meq/L    CO2 25 23 - 33 meq/L    Glucose 113 (H) 70 - 108 mg/dL    BUN 19 7 - 22 mg/dL    Creatinine 0.9 0.4 - 1.2 mg/dL    Calcium 9.1 8.5 - 10.5 mg/dL   CBC auto differential   Result Value Ref Range    WBC 10.4 4.8 - 10.8 thou/mm3    RBC 4.57 (L) 4.70 - 6.10 mill/mm3    Hemoglobin 13.2 (L) 14.0 - 18.0 gm/dl    Hematocrit 40.9 (L) 42.0 - 52.0 %    MCV 89.5 80.0 - 94.0 fL    MCH 28.9 26.0 - 33.0 pg    MCHC 32.3 32.2 - 35.5 gm/dl    RDW-CV 13.8 11.5 - 14.5 %    RDW-SD 44.6 35.0 - 45.0 fL    Platelets 964 180 - 036 thou/mm3    MPV 9.1 (L) 9.4 - 12.4 fL    Seg Neutrophils 71.6 %    Lymphocytes 14.5 %    Monocytes 9.3 %    Eosinophils 2.5 %    Basophils 0.7 %    Immature Granulocytes 1.4 %    Segs Absolute 7.4 1.8 - 7.7 thou/mm3    Lymphocytes Absolute 1.5 1.0 - 4.8 thou/mm3    Monocytes Absolute 1.0 0.4 - 1.3 thou/mm3    Eosinophils Absolute 0.3 0.0 - 0.4 thou/mm3    Basophils Absolute 0.1 0.0 - 0.1 thou/mm3    Immature Grans (Abs) 0.15 (H) 0.00 - 0.07 thou/mm3    nRBC 0 /100 wbc   Anion Gap   Result Value Ref Range    Anion Gap 11.0 8.0 - 16.0 meq/L Glomerular Filtration Rate, Estimated   Result Value Ref Range    Est, Glom Filt Rate 81 (A) RP/LFM/5.31W2   Basic Metabolic Panel w/ Reflex to MG   Result Value Ref Range    Sodium 140 135 - 145 meq/L    Potassium reflex Magnesium 3.3 (L) 3.5 - 5.2 meq/L    Chloride 102 98 - 111 meq/L    CO2 25 23 - 33 meq/L    Glucose 96 70 - 108 mg/dL    BUN 21 7 - 22 mg/dL    Creatinine 1.1 0.4 - 1.2 mg/dL    Calcium 9.1 8.5 - 10.5 mg/dL   CBC auto differential   Result Value Ref Range    WBC 9.7 4.8 - 10.8 thou/mm3    RBC 4.62 (L) 4.70 - 6.10 mill/mm3    Hemoglobin 13.8 (L) 14.0 - 18.0 gm/dl    Hematocrit 41.1 (L) 42.0 - 52.0 %    MCV 89.0 80.0 - 94.0 fL    MCH 29.9 26.0 - 33.0 pg    MCHC 33.6 32.2 - 35.5 gm/dl    RDW-CV 14.0 11.5 - 14.5 %    RDW-SD 44.8 35.0 - 45.0 fL    Platelets 305 975 - 299 thou/mm3    MPV 8.9 (L) 9.4 - 12.4 fL    Seg Neutrophils 71.1 %    Lymphocytes 13.9 %    Monocytes 9.8 %    Eosinophils 3.5 %    Basophils 0.5 %    Immature Granulocytes 1.2 %    Segs Absolute 6.9 1.8 - 7.7 thou/mm3    Lymphocytes Absolute 1.3 1.0 - 4.8 thou/mm3    Monocytes Absolute 1.0 0.4 - 1.3 thou/mm3    Eosinophils Absolute 0.3 0.0 - 0.4 thou/mm3    Basophils Absolute 0.0 0.0 - 0.1 thou/mm3    Immature Grans (Abs) 0.12 (H) 0.00 - 0.07 thou/mm3    nRBC 0 /100 wbc   Anion Gap   Result Value Ref Range    Anion Gap 13.0 8.0 - 16.0 meq/L   Glomerular Filtration Rate, Estimated   Result Value Ref Range    Est, Glom Filt Rate 64 (A) ml/min/1.73m2   Magnesium   Result Value Ref Range    Magnesium 1.8 1.6 - 2.4 mg/dL   Basic Metabolic Panel w/ Reflex to MG   Result Value Ref Range    Sodium 140 135 - 145 meq/L    Potassium reflex Magnesium 3.3 (L) 3.5 - 5.2 meq/L    Chloride 104 98 - 111 meq/L    CO2 23 23 - 33 meq/L    Glucose 121 (H) 70 - 108 mg/dL    BUN 23 (H) 7 - 22 mg/dL    Creatinine 1.1 0.4 - 1.2 mg/dL    Calcium 9.3 8.5 - 10.5 mg/dL   CBC auto differential   Result Value Ref Range    WBC 11.6 (H) 4.8 - 10.8 thou/mm3    RBC 4.33 (L) 4.70 - 6.10 mill/mm3    Hemoglobin 12.5 (L) 14.0 - 18.0 gm/dl    Hematocrit 39.6 (L) 42.0 - 52.0 %    MCV 91.5 80.0 - 94.0 fL    MCH 28.9 26.0 - 33.0 pg    MCHC 31.6 (L) 32.2 - 35.5 gm/dl    RDW-CV 13.8 11.5 - 14.5 %    RDW-SD 46.3 (H) 35.0 - 45.0 fL    Platelets 346 782 - 013 thou/mm3    MPV 8.8 (L) 9.4 - 12.4 fL    Seg Neutrophils 75.3 %    Lymphocytes 11.6 %    Monocytes 10.5 %    Eosinophils 1.1 %    Basophils 0.3 %    Immature Granulocytes 1.2 %    Segs Absolute 8.7 (H) 1.8 - 7.7 thou/mm3    Lymphocytes Absolute 1.3 1.0 - 4.8 thou/mm3    Monocytes Absolute 1.2 0.4 - 1.3 thou/mm3    Eosinophils Absolute 0.1 0.0 - 0.4 thou/mm3    Basophils Absolute 0.0 0.0 - 0.1 thou/mm3    Immature Grans (Abs) 0.14 (H) 0.00 - 0.07 thou/mm3    nRBC 0 /100 wbc   Basic Metabolic Panel   Result Value Ref Range    Sodium 140 135 - 145 meq/L    Potassium 3.3 (L) 3.5 - 5.2 meq/L    Chloride 105 98 - 111 meq/L    CO2 25 23 - 33 meq/L    Glucose 118 (H) 70 - 108 mg/dL    BUN 23 (H) 7 - 22 mg/dL    Creatinine 1.0 0.4 - 1.2 mg/dL    Calcium 8.7 8.5 - 10.5 mg/dL   Anion Gap   Result Value Ref Range    Anion Gap 10.0 8.0 - 16.0 meq/L   Glomerular Filtration Rate, Estimated   Result Value Ref Range    Est, Glom Filt Rate 72 (A) ml/min/1.73m2   Magnesium   Result Value Ref Range    Magnesium 1.8 1.6 - 2.4 mg/dL   Anion Gap   Result Value Ref Range    Anion Gap 13.0 8.0 - 16.0 meq/L   Glomerular Filtration Rate, Estimated   Result Value Ref Range    Est, Glom Filt Rate 64 (A) FT/TFS/6.62X2   Basic Metabolic Panel w/ Reflex to MG   Result Value Ref Range    Sodium 145 135 - 145 meq/L    Potassium reflex Magnesium 3.1 (L) 3.5 - 5.2 meq/L    Chloride 107 98 - 111 meq/L    CO2 25 23 - 33 meq/L    Glucose 100 70 - 108 mg/dL    BUN 23 (H) 7 - 22 mg/dL    Creatinine 0.9 0.4 - 1.2 mg/dL    Calcium 9.4 8.5 - 10.5 mg/dL   CBC auto differential   Result Value Ref Range    WBC 9.2 4.8 - 10.8 thou/mm3    RBC 4.27 (L) 4.70 - 6.10 mill/mm3    Hemoglobin 12.2 (L) 14.0 - 18.0 gm/dl    Hematocrit 39.0 (L) 42.0 - 52.0 %    MCV 91.3 80.0 - 94.0 fL    MCH 28.6 26.0 - 33.0 pg    MCHC 31.3 (L) 32.2 - 35.5 gm/dl    RDW-CV 13.8 11.5 - 14.5 %    RDW-SD 45.8 (H) 35.0 - 45.0 fL    Platelets 672 440 - 758 thou/mm3    MPV 9.0 (L) 9.4 - 12.4 fL    Seg Neutrophils 69.4 %    Lymphocytes 16.8 %    Monocytes 9.9 %    Eosinophils 2.0 %    Basophils 0.8 %    Immature Granulocytes 1.1 %    Segs Absolute 6.4 1.8 - 7.7 thou/mm3    Lymphocytes Absolute 1.5 1.0 - 4.8 thou/mm3    Monocytes Absolute 0.9 0.4 - 1.3 thou/mm3    Eosinophils Absolute 0.2 0.0 - 0.4 thou/mm3    Basophils Absolute 0.1 0.0 - 0.1 thou/mm3    Immature Grans (Abs) 0.10 (H) 0.00 - 0.07 thou/mm3    nRBC 0 /100 wbc   Anion Gap   Result Value Ref Range    Anion Gap 13.0 8.0 - 16.0 meq/L   Magnesium   Result Value Ref Range    Magnesium 2.1 1.6 - 2.4 mg/dL   Glomerular Filtration Rate, Estimated   Result Value Ref Range    Est, Glom Filt Rate 81 (A) GZ/VHX/6.12C6   Basic Metabolic Panel w/ Reflex to MG   Result Value Ref Range    Sodium 142 135 - 145 meq/L    Potassium reflex Magnesium 2.8 (L) 3.5 - 5.2 meq/L    Chloride 107 98 - 111 meq/L    CO2 24 23 - 33 meq/L    Glucose 107 70 - 108 mg/dL    BUN 21 7 - 22 mg/dL    Creatinine 0.9 0.4 - 1.2 mg/dL    Calcium 9.1 8.5 - 10.5 mg/dL   CBC auto differential   Result Value Ref Range    WBC 9.9 4.8 - 10.8 thou/mm3    RBC 4.20 (L) 4.70 - 6.10 mill/mm3    Hemoglobin 12.1 (L) 14.0 - 18.0 gm/dl    Hematocrit 38.6 (L) 42.0 - 52.0 %    MCV 91.9 80.0 - 94.0 fL    MCH 28.8 26.0 - 33.0 pg    MCHC 31.3 (L) 32.2 - 35.5 gm/dl    RDW-CV 13.8 11.5 - 14.5 %    RDW-SD 46.5 (H) 35.0 - 45.0 fL    Platelets 758 231 - 563 thou/mm3    MPV 8.9 (L) 9.4 - 12.4 fL    Seg Neutrophils 70.1 %    Lymphocytes 16.1 %    Monocytes 9.9 %    Eosinophils 2.5 %    Basophils 0.6 %    Immature Granulocytes 0.8 %    Segs Absolute 6.9 1.8 - 7.7 thou/mm3    Lymphocytes Absolute 1.6 1.0 - 4.8 thou/mm3    Monocytes Absolute 1.0 0.4 - 1.3 thou/mm3    Eosinophils Absolute 0.2 0.0 - 0.4 thou/mm3    Basophils Absolute 0.1 0.0 - 0.1 thou/mm3    Immature Grans (Abs) 0.08 (H) 0.00 - 0.07 thou/mm3    nRBC 0 /100 wbc   Anion Gap   Result Value Ref Range    Anion Gap 11.0 8.0 - 16.0 meq/L   Glomerular Filtration Rate, Estimated   Result Value Ref Range    Est, Glom Filt Rate 81 (A) ml/min/1.73m2   Magnesium   Result Value Ref Range    Magnesium 2.1 1.6 - 2.4 mg/dL   Valproic acid level, total   Result Value Ref Range    Valproic Acid Lvl 46.1 (L) 50.0 - 100.0 ug/mL   Basic Metabolic Panel w/ Reflex to MG   Result Value Ref Range    Sodium 144 135 - 145 meq/L    Potassium reflex Magnesium 3.2 (L) 3.5 - 5.2 meq/L    Chloride 108 98 - 111 meq/L    CO2 27 23 - 33 meq/L    Glucose 105 70 - 108 mg/dL    BUN 23 (H) 7 - 22 mg/dL    Creatinine 0.9 0.4 - 1.2 mg/dL    Calcium 8.8 8.5 - 10.5 mg/dL   CBC auto differential   Result Value Ref Range    WBC 8.6 4.8 - 10.8 thou/mm3    RBC 3.88 (L) 4.70 - 6.10 mill/mm3    Hemoglobin 11.3 (L) 14.0 - 18.0 gm/dl    Hematocrit 35.8 (L) 42.0 - 52.0 %    MCV 92.3 80.0 - 94.0 fL    MCH 29.1 26.0 - 33.0 pg    MCHC 31.6 (L) 32.2 - 35.5 gm/dl    RDW-CV 13.8 11.5 - 14.5 %    RDW-SD 46.2 (H) 35.0 - 45.0 fL    Platelets 343 717 - 421 thou/mm3    MPV 9.0 (L) 9.4 - 12.4 fL    Seg Neutrophils 61.1 %    Lymphocytes 24.9 %    Monocytes 7.6 %    Eosinophils 4.1 %    Basophils 0.9 %    Immature Granulocytes 1.4 %    Segs Absolute 5.3 1.8 - 7.7 thou/mm3    Lymphocytes Absolute 2.1 1.0 - 4.8 thou/mm3    Monocytes Absolute 0.7 0.4 - 1.3 thou/mm3    Eosinophils Absolute 0.4 0.0 - 0.4 thou/mm3    Basophils Absolute 0.1 0.0 - 0.1 thou/mm3    Immature Grans (Abs) 0.12 (H) 0.00 - 0.07 thou/mm3    nRBC 0 /100 wbc   Anion Gap   Result Value Ref Range    Anion Gap 9.0 8.0 - 16.0 meq/L   Glomerular Filtration Rate, Estimated   Result Value Ref Range    Est, Glom Filt Rate 81 (A) ml/min/1.73m2   Magnesium   Result Value Ref Range    Magnesium 2.1 1.6 - 2.4 mg/dL   Basic Metabolic Panel w/ Reflex to MG   Result Value Ref Range    Sodium 141 135 - 145 meq/L    Potassium reflex Magnesium 3.6 3.5 - 5.2 meq/L    Chloride 109 98 - 111 meq/L    CO2 25 23 - 33 meq/L    Glucose 110 (H) 70 - 108 mg/dL    BUN 22 7 - 22 mg/dL    Creatinine 0.9 0.4 - 1.2 mg/dL    Calcium 8.7 8.5 - 10.5 mg/dL   CBC auto differential   Result Value Ref Range    WBC 8.7 4.8 - 10.8 thou/mm3    RBC 3.87 (L) 4.70 - 6.10 mill/mm3    Hemoglobin 11.5 (L) 14.0 - 18.0 gm/dl    Hematocrit 35.6 (L) 42.0 - 52.0 %    MCV 92.0 80.0 - 94.0 fL    MCH 29.7 26.0 - 33.0 pg    MCHC 32.3 32.2 - 35.5 gm/dl    RDW-CV 13.7 11.5 - 14.5 %    RDW-SD 45.3 (H) 35.0 - 45.0 fL    Platelets 944 107 - 709 thou/mm3    MPV 9.2 (L) 9.4 - 12.4 fL    Seg Neutrophils 63.8 %    Lymphocytes 22.8 %    Monocytes 6.6 %    Eosinophils 4.6 %    Basophils 0.9 %    Immature Granulocytes 1.3 %    Segs Absolute 5.6 1.8 - 7.7 thou/mm3    Lymphocytes Absolute 2.0 1.0 - 4.8 thou/mm3    Monocytes Absolute 0.6 0.4 - 1.3 thou/mm3    Eosinophils Absolute 0.4 0.0 - 0.4 thou/mm3    Basophils Absolute 0.1 0.0 - 0.1 thou/mm3    Immature Grans (Abs) 0.11 (H) 0.00 - 0.07 thou/mm3    nRBC 0 /100 wbc   Valproic acid level, total   Result Value Ref Range    Valproic Acid Lvl 53.1 50.0 - 100.0 ug/mL   Anion Gap   Result Value Ref Range    Anion Gap 7.0 (L) 8.0 - 16.0 meq/L   Glomerular Filtration Rate, Estimated   Result Value Ref Range    Est, Glom Filt Rate 81 (A) DH/FCU/2.46M8   Basic Metabolic Panel w/ Reflex to MG   Result Value Ref Range    Sodium 141 135 - 145 meq/L    Potassium reflex Magnesium 3.8 3.5 - 5.2 meq/L    Chloride 105 98 - 111 meq/L    CO2 27 23 - 33 meq/L    Glucose 99 70 - 108 mg/dL    BUN 18 7 - 22 mg/dL    Creatinine 0.8 0.4 - 1.2 mg/dL    Calcium 8.8 8.5 - 10.5 mg/dL   CBC auto differential   Result Value Ref Range    WBC 8.8 4.8 - 10.8 thou/mm3    RBC 3.99 (L) 4.70 - 6.10 mill/mm3    Hemoglobin 11.6 (L) 14.0 - 18.0 gm/dl Hematocrit 36.7 (L) 42.0 - 52.0 %    MCV 92.0 80.0 - 94.0 fL    MCH 29.1 26.0 - 33.0 pg    MCHC 31.6 (L) 32.2 - 35.5 gm/dl    RDW-CV 13.8 11.5 - 14.5 %    RDW-SD 45.4 (H) 35.0 - 45.0 fL    Platelets 318 886 - 461 thou/mm3    MPV 9.1 (L) 9.4 - 12.4 fL    Seg Neutrophils 66.2 %    Lymphocytes 21.2 %    Monocytes 6.7 %    Eosinophils 4.3 %    Basophils 0.7 %    Immature Granulocytes 0.9 %    Segs Absolute 5.8 1.8 - 7.7 thou/mm3    Lymphocytes Absolute 1.9 1.0 - 4.8 thou/mm3    Monocytes Absolute 0.6 0.4 - 1.3 thou/mm3    Eosinophils Absolute 0.4 0.0 - 0.4 thou/mm3    Basophils Absolute 0.1 0.0 - 0.1 thou/mm3    Immature Grans (Abs) 0.08 (H) 0.00 - 0.07 thou/mm3    nRBC 0 /100 wbc   Anion Gap   Result Value Ref Range    Anion Gap 9.0 8.0 - 16.0 meq/L   Glomerular Filtration Rate, Estimated   Result Value Ref Range    Est, Glom Filt Rate >90 ml/min/1.73m2   POCT Glucose   Result Value Ref Range    POC Glucose 96 70 - 108 mg/dl          No results found for this or any previous visit. No results found for this or any previous visit. No results found for this or any previous visit. No results found for this or any previous visit. Results for orders placed during the hospital encounter of 12/13/21    MRI BRAIN WO CONTRAST    Narrative  PROCEDURE: MRI BRAIN WO CONTRAST    INDICATION:  Seizure ([address] Varicella encephalitis. Follow-up. COMPARISON: MRI brain dated 9/8/2021. TECHNIQUE: Multiplanar and multiple spin echo fast sequence MRI images were obtained of the brain without contrast.    FINDINGS:  Images are motion degraded despite fast sequences. There is stable mild to moderate volume loss. There are mild scattered focal areas of T2/FLAIR prolongation in the periventricular, subcortical and deep white matter, better seen on prior exam. These are  grossly stable. No intra or extra-axial mass is identified. No focal areas of restricted diffusion are present. The major vascular flow voids appear patent. The patient is status post bilateral lens extractions. Orbits are otherwise unremarkable. Paranasal sinuses are clear. There is a small left mastoid effusion. Impression  1. No evidence of acute intracranial abnormality. 2. Stable mild severity chronic microvascular angiopathy. 3. Small left mastoid effusion. **This report has been created using voice recognition software. It may contain minor errors which are inherent in voice recognition technology. **  Final report electronically signed by Dr. Brittany Landers MD on 12/13/2021 5:03 PM        CT HEAD WO CONTRAST    Narrative  Exam: CT brain without contrast    Comparison: None    Clinical history: Acute mental status change    Findings:  The ventricles are normal in size and position. No intracranial masses or midline shift identified. No abnormal extra-axial fluid collections are seen. No acute intracranial hemorrhage. The visualized paranasal sinuses are clear. No skull fracture identified. Nonspecific tissue swelling at the right forehead and in the right malar  region. Impression  Impression:  1. No acute intracranial hemorrhage or process identified. 2. Nonspecific tissue swelling at the right forehead and in the right  malar region. This document has been electronically signed by: Santa Weston MD on  09/07/2021 11:02 PM    All CTs at this facility use dose modulation techniques and iterative  reconstructions, and/or weight-based dosing  when appropriate to reduce radiation to a low as reasonably achievable. Assessment:     Diagnosis Orders   1. Seizure ([address]        2. Varicella encephalitis             Follow up for varicella encephalitis, Seizure. He is doing well, no reported seizure. No headache. He is on Depakote 750 mg twice a day. He denies any confusion or episodes of altered mental status. He has occasional headache. He is on Depakote and is tolerating the medication well.  After detailed discussion with patient and his son we agreed on the following plan. Plan:  Continue with Depakote 750 mg twice a day. Obtain CBC, Hepatic panel Depakote level. Report any new seizure. Continue following with speech, physical, and occupational therapy recommendations  Call with any new symptoms or concerns. Follow up in 6 months.        Total time 25 min    Karan Marquez MD

## 2023-01-13 NOTE — PATIENT INSTRUCTIONS
Continue with Depakote 750 mg twice a day. Obtain CBC, Hepatic panel Depakote level. Report any new seizure. Continue following with speech, physical, and occupational therapy recommendations  Call with any new symptoms or concerns. Follow up in 6 months.

## 2023-04-05 ENCOUNTER — TELEPHONE (OUTPATIENT)
Dept: NEUROLOGY | Age: 83
End: 2023-04-05

## 2023-04-05 NOTE — TELEPHONE ENCOUNTER
Daughter called office requesting lab results from date of service 3/28/2023. Please review.    Thank you

## 2023-04-06 NOTE — TELEPHONE ENCOUNTER
Didier Geller - daughter okay per [facility] of results. Verbalized understanding with no questions at this time.

## 2023-05-08 NOTE — PROGRESS NOTES
BUN 21   < > 23* 23* 23*   CREATININE 1.1   < > 1.0 1.1 0.9   GLUCOSE 96   < > 118* 121* 100   CALCIUM 9.1   < > 8.7 9.3 9.4   MG 1.8  --   --  1.8 2.1    < > = values in this interval not displayed. Hepatic:   No results for input(s): LABALBU, AST, ALT, ALB, BILITOT, ALKPHOS in the last 72 hours. Meds:  Infusion:    sodium chloride 30 mL/hr at 09/16/21 1033    sodium chloride       Meds:    enoxaparin  40 mg SubCUTAneous Q24H    lisinopril  10 mg Oral Daily    acyclovir  10 mg/kg (Ideal) IntraVENous Q8H    polyethylene glycol  17 g Oral Daily    senna  1 tablet Oral Nightly    docusate  100 mg Oral Daily    levetiracetam  1,000 mg IntraVENous Q12H    prednisoLONE acetate  1 drop Right Eye 4 times per day    [Held by provider] QUEtiapine  25 mg Oral BID    [Held by provider] baclofen  20 mg Oral TID    calcium elemental  500 mg Oral BID    [Held by provider] magnesium oxide  400 mg Oral 4x Daily AC & HS    vitamin B-6  200 mg Oral Daily    tamsulosin  0.4 mg Oral Daily    Vitamin D  1,000 Units Oral BID AC    zinc sulfate  50 mg Oral Daily    sodium chloride flush  5-40 mL IntraVENous 2 times per day     Meds prn: magnesium sulfate, potassium chloride **OR** potassium alternative oral replacement **OR** potassium chloride, sodium chloride flush, sodium chloride, ondansetron **OR** ondansetron, acetaminophen **OR** acetaminophen, gabapentin, morphine       Impression and Plan:  1. DEANDRE: Nonoliguric, likely ATN  Had emergent hemodialysis treatment last week out of concerns for acyclovir-induced neurotoxicity  Renal function remains stable at this time  Discussed with nursing staff--patient apparently does self urinary catheterizations at home  Currently has a [place] catheter  Nursing staff making arrangements for dialysis catheter to be removed    2. Hypokalemia. Replaced  3. Hypernatremia. Sodium 145--change fluids to half-normal saline  4. Altered mental status.    5.  Herpes zoster ophthalmicus  6. Mild metabolic acidosis: Resolved  7. Hypertension  8. History of BPH  9. Urethritis:  ID following  10.   Hypertension:  on lisinopril, can increase as needed    No significant renal issues at this time   nephrology will see as needed  Please call if any issues    Rea Sinha MD  [facility] done

## 2023-11-06 ENCOUNTER — OFFICE VISIT (OUTPATIENT)
Dept: NEUROLOGY | Age: 83
End: 2023-11-06
Payer: MEDICARE

## 2023-11-06 VITALS
OXYGEN SATURATION: 95 % | HEIGHT: 67 IN | BODY MASS INDEX: 31.79 KG/M2 | SYSTOLIC BLOOD PRESSURE: 130 MMHG | DIASTOLIC BLOOD PRESSURE: 78 MMHG | HEART RATE: 83 BPM

## 2023-11-06 DIAGNOSIS — R56.9 SEIZURE (HCC): Primary | ICD-10-CM

## 2023-11-06 DIAGNOSIS — B01.11 VARICELLA ENCEPHALITIS: ICD-10-CM

## 2023-11-06 PROCEDURE — 1123F ACP DISCUSS/DSCN MKR DOCD: CPT | Performed by: NURSE PRACTITIONER

## 2023-11-06 PROCEDURE — 99213 OFFICE O/P EST LOW 20 MIN: CPT | Performed by: NURSE PRACTITIONER

## 2023-11-06 RX ORDER — OXYBUTYNIN CHLORIDE 5 MG/1
5 TABLET, EXTENDED RELEASE ORAL DAILY
COMMUNITY

## 2023-11-06 RX ORDER — ATORVASTATIN CALCIUM 20 MG/1
20 TABLET, FILM COATED ORAL DAILY
COMMUNITY

## 2023-11-06 RX ORDER — DIVALPROEX SODIUM 250 MG/1
750 TABLET, EXTENDED RELEASE ORAL 2 TIMES DAILY
Qty: 180 TABLET | Refills: 0 | Status: SHIPPED | OUTPATIENT
Start: 2023-11-06

## 2023-11-06 NOTE — PROGRESS NOTES
NEUROLOGY OUT PATIENT FOLLOW UP NOTE:  11/6/202310:36 AM    Maddy Valenzuela is here for follow up for varicella encephalitis, seizure . No Known Allergies    Current Outpatient Medications:     oxybutynin (DITROPAN-XL) 5 MG extended release tablet, Take 1 tablet by mouth daily, Disp: , Rfl:     atorvastatin (LIPITOR) 20 MG tablet, Take 1 tablet by mouth daily, Disp: , Rfl:     ferrous sulfate (IRON 325) 325 (65 Fe) MG tablet, Take 1 tablet by mouth daily (with breakfast), Disp: , Rfl:     melatonin 3 MG TABS tablet, Take 1 tablet by mouth nightly as needed, Disp: , Rfl:     Cyanocobalamin (VITAMIN B 12 PO), Take by mouth daily, Disp: , Rfl:     ascorbic acid (VITAMIN C) 1000 MG tablet, Take 1 tablet by mouth 4 times daily (before meals and nightly) For cholesterols, Disp: , Rfl:     B Complex-Folic Acid (L-074 BALANCED TR PO), Take 1 tablet by mouth 4 times daily (before meals and nightly) Natures Made B-100 time released, Disp: , Rfl:     lisinopril (PRINIVIL;ZESTRIL) 20 MG tablet, Take 1 tablet by mouth daily, Disp: , Rfl:     magnesium 200 MG TABS tablet, Take 1 capsule by mouth 2 times daily (before meals) , Disp: , Rfl:     Pyridoxine HCl (VITAMIN B-6) 100 MG tablet, Take 2 tablets by mouth daily, Disp: , Rfl:     I reviewed the past medical history, allergies, medications, social history and family history. PE:   Vitals:    11/06/23 1025   BP: 130/78   Site: Right Upper Arm   Position: Sitting   Cuff Size: Medium Adult   Pulse: 83   SpO2: 95%   Height: 1.702 m (5' 7\")        General Appearance:  awake, alert, oriented   Gen: NAD, Language is Intact. Skin: no rash, lesion, dry  to touch. warm  Head: no rash, no icterus  Neck: There is no carotid bruits. The Neck is supple. Neuro: CN 2-12 grossly intact with no focal deficits. Power 5/5 in bilateral upper extremities, -4/5 in bilateral lower extremities. Reflexes are  symmetric. Long tracts are intact. Cerebellar exam is Intact.  Sensory exam is

## 2023-11-20 DIAGNOSIS — R56.9 SEIZURE (HCC): Primary | ICD-10-CM

## 2023-11-20 RX ORDER — DIVALPROEX SODIUM 250 MG/1
750 TABLET, EXTENDED RELEASE ORAL 2 TIMES DAILY
Qty: 180 TABLET | Refills: 0 | Status: SHIPPED | OUTPATIENT
Start: 2023-11-20